# Patient Record
Sex: MALE | Race: BLACK OR AFRICAN AMERICAN | NOT HISPANIC OR LATINO | Employment: FULL TIME | URBAN - METROPOLITAN AREA
[De-identification: names, ages, dates, MRNs, and addresses within clinical notes are randomized per-mention and may not be internally consistent; named-entity substitution may affect disease eponyms.]

---

## 2017-01-03 RX ORDER — EMPAGLIFLOZIN 10 MG/1
TABLET, FILM COATED ORAL
Qty: 30 TABLET | Refills: 0 | Status: SHIPPED | OUTPATIENT
Start: 2017-01-03 | End: 2017-02-19 | Stop reason: SDUPTHER

## 2017-01-03 RX ORDER — METFORMIN HYDROCHLORIDE 500 MG/1
TABLET, EXTENDED RELEASE ORAL
Qty: 60 TABLET | Refills: 0 | Status: SHIPPED | OUTPATIENT
Start: 2017-01-03 | End: 2017-04-13 | Stop reason: SDUPTHER

## 2017-01-05 RX ORDER — ATORVASTATIN CALCIUM 10 MG/1
10 TABLET, FILM COATED ORAL DAILY
Qty: 30 TABLET | Refills: 0 | Status: SHIPPED | OUTPATIENT
Start: 2017-01-05 | End: 2017-02-19 | Stop reason: SDUPTHER

## 2017-01-19 ENCOUNTER — TELEPHONE (OUTPATIENT)
Dept: FAMILY MEDICINE CLINIC | Facility: CLINIC | Age: 52
End: 2017-01-19

## 2017-01-19 RX ORDER — HYDROCODONE BITARTRATE AND ACETAMINOPHEN 5; 325 MG/1; MG/1
1 TABLET ORAL EVERY 6 HOURS PRN
Qty: 30 TABLET | Refills: 0 | Status: SHIPPED | OUTPATIENT
Start: 2017-01-19 | End: 2017-02-14 | Stop reason: SDUPTHER

## 2017-01-19 NOTE — TELEPHONE ENCOUNTER
PT INFORMED RX UP FRONT    ----- Message from Ankur Avitia Jr., MD sent at 1/19/2017 11:08 AM EST -----  Done  ----- Message -----     From: Elisha Snider MA     Sent: 1/19/2017  10:55 AM       To: Ankur Avitia Jr., MD    RF HYDROCODONE

## 2017-02-14 ENCOUNTER — TELEPHONE (OUTPATIENT)
Dept: FAMILY MEDICINE CLINIC | Facility: CLINIC | Age: 52
End: 2017-02-14

## 2017-02-14 RX ORDER — HYDROCODONE BITARTRATE AND ACETAMINOPHEN 5; 325 MG/1; MG/1
1 TABLET ORAL EVERY 6 HOURS PRN
Qty: 30 TABLET | Refills: 0
Start: 2017-02-14 | End: 2017-03-13 | Stop reason: SDUPTHER

## 2017-02-14 NOTE — TELEPHONE ENCOUNTER
rx up front pt informed    ----- Message from Elisha Snider MA sent at 2/14/2017  1:19 PM EST -----  done  ----- Message -----     From: Elisha Snidre MA     Sent: 2/14/2017   1:15 PM       To: Ankur Avitia Jr., MD    Refill hydrocodone

## 2017-02-20 RX ORDER — ATORVASTATIN CALCIUM 10 MG/1
TABLET, FILM COATED ORAL
Qty: 30 TABLET | Refills: 0 | Status: SHIPPED | OUTPATIENT
Start: 2017-02-20 | End: 2017-03-24 | Stop reason: SDUPTHER

## 2017-02-20 RX ORDER — METFORMIN HYDROCHLORIDE 500 MG/1
TABLET, EXTENDED RELEASE ORAL
Qty: 60 TABLET | Refills: 0 | Status: SHIPPED | OUTPATIENT
Start: 2017-02-20 | End: 2019-02-12 | Stop reason: SDUPTHER

## 2017-02-20 RX ORDER — AMLODIPINE BESYLATE 5 MG/1
TABLET ORAL
Qty: 30 TABLET | Refills: 0 | Status: SHIPPED | OUTPATIENT
Start: 2017-02-20 | End: 2017-03-24 | Stop reason: SDUPTHER

## 2017-02-20 RX ORDER — EMPAGLIFLOZIN 10 MG/1
TABLET, FILM COATED ORAL
Qty: 30 TABLET | Refills: 0 | Status: SHIPPED | OUTPATIENT
Start: 2017-02-20 | End: 2017-03-24 | Stop reason: SDUPTHER

## 2017-03-13 ENCOUNTER — TELEPHONE (OUTPATIENT)
Dept: FAMILY MEDICINE CLINIC | Facility: CLINIC | Age: 52
End: 2017-03-13

## 2017-03-13 RX ORDER — HYDROCODONE BITARTRATE AND ACETAMINOPHEN 5; 325 MG/1; MG/1
1 TABLET ORAL EVERY 6 HOURS PRN
Qty: 30 TABLET | Refills: 0 | Status: SHIPPED | OUTPATIENT
Start: 2017-03-13 | End: 2017-04-11 | Stop reason: SDUPTHER

## 2017-03-13 RX ORDER — HYDROCODONE BITARTRATE AND ACETAMINOPHEN 5; 325 MG/1; MG/1
1 TABLET ORAL EVERY 6 HOURS PRN
Qty: 30 TABLET | Refills: 0
Start: 2017-03-13 | End: 2017-03-13 | Stop reason: SDUPTHER

## 2017-03-13 NOTE — TELEPHONE ENCOUNTER
rx up front  Pt informed    ----- Message from Ankur Avitia Jr., MD sent at 3/13/2017 11:13 AM EDT -----  Done let him know  ----- Message -----     From: Elisha Snider MA     Sent: 3/13/2017  10:55 AM       To: Ankur Avitia Jr., MD    Refill hydrocodone

## 2017-03-24 RX ORDER — EMPAGLIFLOZIN 10 MG/1
TABLET, FILM COATED ORAL
Qty: 30 TABLET | Refills: 0 | Status: SHIPPED | OUTPATIENT
Start: 2017-03-24 | End: 2017-04-22 | Stop reason: SDUPTHER

## 2017-03-24 RX ORDER — AMLODIPINE BESYLATE 5 MG/1
TABLET ORAL
Qty: 30 TABLET | Refills: 0 | Status: SHIPPED | OUTPATIENT
Start: 2017-03-24 | End: 2017-04-22 | Stop reason: SDUPTHER

## 2017-03-24 RX ORDER — ATORVASTATIN CALCIUM 10 MG/1
TABLET, FILM COATED ORAL
Qty: 30 TABLET | Refills: 0 | Status: SHIPPED | OUTPATIENT
Start: 2017-03-24 | End: 2017-04-22 | Stop reason: SDUPTHER

## 2017-03-27 RX ORDER — METFORMIN HYDROCHLORIDE 500 MG/1
TABLET, EXTENDED RELEASE ORAL
Qty: 60 TABLET | Refills: 0 | Status: SHIPPED | OUTPATIENT
Start: 2017-03-27 | End: 2017-06-02

## 2017-04-11 ENCOUNTER — TELEPHONE (OUTPATIENT)
Dept: FAMILY MEDICINE CLINIC | Facility: CLINIC | Age: 52
End: 2017-04-11

## 2017-04-11 RX ORDER — HYDROCODONE BITARTRATE AND ACETAMINOPHEN 5; 325 MG/1; MG/1
1 TABLET ORAL EVERY 6 HOURS PRN
Qty: 30 TABLET | Refills: 0 | Status: SHIPPED | OUTPATIENT
Start: 2017-04-11 | End: 2017-04-13 | Stop reason: SDUPTHER

## 2017-04-11 NOTE — TELEPHONE ENCOUNTER
PT INFORMED  HAS APPT 4-13    ----- Message from Ankur Avitia Jr., MD sent at 4/11/2017  9:28 AM EDT -----  Done  Due for fu appt 5/17  ----- Message -----     From: Elisha Snider MA     Sent: 4/11/2017   9:01 AM       To: Ankur Avitia Jr., MD    PT NEEDS REFILL ON HYDROCODONE

## 2017-04-13 ENCOUNTER — OFFICE VISIT (OUTPATIENT)
Dept: FAMILY MEDICINE CLINIC | Facility: CLINIC | Age: 52
End: 2017-04-13

## 2017-04-13 VITALS
TEMPERATURE: 98.1 F | SYSTOLIC BLOOD PRESSURE: 140 MMHG | HEART RATE: 99 BPM | WEIGHT: 254 LBS | BODY MASS INDEX: 36.36 KG/M2 | HEIGHT: 70 IN | DIASTOLIC BLOOD PRESSURE: 90 MMHG | OXYGEN SATURATION: 95 %

## 2017-04-13 DIAGNOSIS — E11.9 DIABETES MELLITUS WITHOUT COMPLICATION (HCC): ICD-10-CM

## 2017-04-13 DIAGNOSIS — Z79.899 HIGH RISK MEDICATION USE: ICD-10-CM

## 2017-04-13 DIAGNOSIS — E78.49 OTHER HYPERLIPIDEMIA: ICD-10-CM

## 2017-04-13 DIAGNOSIS — I10 HYPERTENSION, ESSENTIAL: Primary | ICD-10-CM

## 2017-04-13 DIAGNOSIS — Z23 NEED FOR PNEUMOCOCCAL VACCINE: ICD-10-CM

## 2017-04-13 LAB — ALBUMIN UR-MCNC: 100 MG/L (ref 0–20)

## 2017-04-13 PROCEDURE — 90732 PPSV23 VACC 2 YRS+ SUBQ/IM: CPT | Performed by: INTERNAL MEDICINE

## 2017-04-13 PROCEDURE — 99214 OFFICE O/P EST MOD 30 MIN: CPT | Performed by: INTERNAL MEDICINE

## 2017-04-13 PROCEDURE — 82043 UR ALBUMIN QUANTITATIVE: CPT | Performed by: INTERNAL MEDICINE

## 2017-04-13 PROCEDURE — 90471 IMMUNIZATION ADMIN: CPT | Performed by: INTERNAL MEDICINE

## 2017-04-13 RX ORDER — AMITRIPTYLINE HYDROCHLORIDE 10 MG/1
10 TABLET, FILM COATED ORAL NIGHTLY
COMMUNITY
End: 2018-10-26 | Stop reason: SDUPTHER

## 2017-04-13 RX ORDER — LISINOPRIL 10 MG/1
TABLET ORAL
Qty: 30 TABLET | Refills: 3 | Status: SHIPPED | OUTPATIENT
Start: 2017-04-13 | End: 2017-06-19

## 2017-04-13 RX ORDER — HYDROCODONE BITARTRATE AND ACETAMINOPHEN 5; 325 MG/1; MG/1
1 TABLET ORAL EVERY 6 HOURS PRN
Qty: 30 TABLET | Refills: 0 | Status: SHIPPED | OUTPATIENT
Start: 2017-04-13 | End: 2017-05-11 | Stop reason: SDUPTHER

## 2017-04-13 RX ORDER — LISINOPRIL 10 MG/1
TABLET ORAL
Qty: 30 TABLET | Refills: 3 | Status: SHIPPED | OUTPATIENT
Start: 2017-04-13 | End: 2017-04-13 | Stop reason: SDUPTHER

## 2017-04-13 NOTE — PROGRESS NOTES
Subjective   Go Cho is a 52 y.o. male.   Follow-up on blood pressure lipids medication monitoring diabetes  History of Present Illness   Glu 90s -100s doing fairly well is compliant with medication diet is fair so initially busy does not have time to exercise regularly we will get updated lab values on him.  Do not updated medication monitoring with his chronic pain medication for back issues.    Review of Systems   Neurological: Positive for headaches.       Objective   Vitals:    04/13/17 1047   BP: 140/90   Pulse: 99   Temp: 98.1 °F (36.7 °C)   SpO2: 95%   Weight: 254 lb (115 kg)     Physical Exam   Constitutional: He appears well-developed and well-nourished.   Eyes:   Pupils are equal   Cardiovascular: Normal rate, regular rhythm and normal heart sounds.    Pulmonary/Chest: Effort normal and breath sounds normal.   Abdominal: Soft. Bowel sounds are normal.   Neurological: He is alert.   Nursing note and vitals reviewed.      Lab Results   Component Value Date    INR 1.17 (H) 10/02/2016       Procedures    Assessment/Plan     1.  Hypertension plan continue present medication.  If blood pressure above 140/90 we will  Increase his lisinopril.    2.  Hyperlipidemia plan await lab if satisfactory continue present medication recheck 6 months    3.  Type 2 diabetes plan await labs recheck 6 months ago okay patient is going to contact ophthalmologist for eye exam.    4.  Medication monitoring chronic medication for pain for back urine drug screen obtained.    5.  Immunization update with pneumococcal vaccine given.  Call for lab results in 4 days' time  Much of this encounter note is an electronic transcription/translation of spoken language to printed text.  The electronic translation of spoken language may permit erroneous, or at times, nonsensical words or phrases to be inadvertently transcribed.  Although I have reviewed the note for such errors, some may still exist.

## 2017-04-20 LAB
CONV REPORT SUMMARY: NORMAL
Lab: NORMAL

## 2017-04-22 RX ORDER — EMPAGLIFLOZIN 10 MG/1
TABLET, FILM COATED ORAL
Qty: 30 TABLET | Refills: 0 | Status: SHIPPED | OUTPATIENT
Start: 2017-04-22 | End: 2017-05-21 | Stop reason: SDUPTHER

## 2017-04-22 RX ORDER — AMLODIPINE BESYLATE 5 MG/1
TABLET ORAL
Qty: 30 TABLET | Refills: 0 | Status: SHIPPED | OUTPATIENT
Start: 2017-04-22 | End: 2017-05-21 | Stop reason: SDUPTHER

## 2017-04-22 RX ORDER — ATORVASTATIN CALCIUM 10 MG/1
TABLET, FILM COATED ORAL
Qty: 30 TABLET | Refills: 0 | Status: SHIPPED | OUTPATIENT
Start: 2017-04-22 | End: 2017-05-21 | Stop reason: SDUPTHER

## 2017-04-24 RX ORDER — METFORMIN HYDROCHLORIDE 500 MG/1
TABLET, EXTENDED RELEASE ORAL
Qty: 60 TABLET | Refills: 0 | Status: SHIPPED | OUTPATIENT
Start: 2017-04-24 | End: 2017-06-02

## 2017-05-11 RX ORDER — DEXLANSOPRAZOLE 60 MG/1
CAPSULE, DELAYED RELEASE ORAL
Qty: 30 CAPSULE | Refills: 0 | Status: SHIPPED | OUTPATIENT
Start: 2017-05-11 | End: 2017-06-15 | Stop reason: SDUPTHER

## 2017-05-11 RX ORDER — HYDROCODONE BITARTRATE AND ACETAMINOPHEN 5; 325 MG/1; MG/1
1 TABLET ORAL EVERY 6 HOURS PRN
Qty: 30 TABLET | Refills: 0 | Status: SHIPPED | OUTPATIENT
Start: 2017-05-11 | End: 2017-06-02 | Stop reason: SDUPTHER

## 2017-05-22 RX ORDER — METFORMIN HYDROCHLORIDE 500 MG/1
TABLET, EXTENDED RELEASE ORAL
Qty: 60 TABLET | Refills: 0 | Status: SHIPPED | OUTPATIENT
Start: 2017-05-22 | End: 2017-06-02

## 2017-05-22 RX ORDER — AMLODIPINE BESYLATE 5 MG/1
TABLET ORAL
Qty: 30 TABLET | Refills: 0 | Status: SHIPPED | OUTPATIENT
Start: 2017-05-22 | End: 2017-06-26 | Stop reason: SDUPTHER

## 2017-05-22 RX ORDER — EMPAGLIFLOZIN 10 MG/1
TABLET, FILM COATED ORAL
Qty: 30 TABLET | Refills: 0 | Status: SHIPPED | OUTPATIENT
Start: 2017-05-22 | End: 2017-06-26 | Stop reason: SDUPTHER

## 2017-05-22 RX ORDER — ATORVASTATIN CALCIUM 10 MG/1
TABLET, FILM COATED ORAL
Qty: 30 TABLET | Refills: 0 | Status: SHIPPED | OUTPATIENT
Start: 2017-05-22 | End: 2017-06-26 | Stop reason: SDUPTHER

## 2017-06-02 ENCOUNTER — OFFICE VISIT (OUTPATIENT)
Dept: FAMILY MEDICINE CLINIC | Facility: CLINIC | Age: 52
End: 2017-06-02

## 2017-06-02 VITALS
OXYGEN SATURATION: 95 % | HEIGHT: 70 IN | WEIGHT: 257.4 LBS | TEMPERATURE: 98.5 F | BODY MASS INDEX: 36.85 KG/M2 | DIASTOLIC BLOOD PRESSURE: 80 MMHG | SYSTOLIC BLOOD PRESSURE: 160 MMHG | HEART RATE: 110 BPM

## 2017-06-02 DIAGNOSIS — R80.9 PROTEIN IN URINE: Primary | ICD-10-CM

## 2017-06-02 DIAGNOSIS — E78.49 OTHER HYPERLIPIDEMIA: ICD-10-CM

## 2017-06-02 DIAGNOSIS — E11.9 DIABETES MELLITUS WITHOUT COMPLICATION (HCC): ICD-10-CM

## 2017-06-02 DIAGNOSIS — I10 HYPERTENSION, ESSENTIAL: ICD-10-CM

## 2017-06-02 DIAGNOSIS — L72.9 SCROTAL CYST: Primary | ICD-10-CM

## 2017-06-02 LAB
ALBUMIN SERPL-MCNC: 4.1 G/DL (ref 3.5–5.2)
ALBUMIN UR-MCNC: 50 MG/L (ref 0–20)
ALBUMIN/GLOB SERPL: 1.2 G/DL
ALP SERPL-CCNC: 57 U/L (ref 39–117)
ALT SERPL W P-5'-P-CCNC: 41 U/L (ref 1–41)
ANION GAP SERPL CALCULATED.3IONS-SCNC: 9.4 MMOL/L
AST SERPL-CCNC: 24 U/L (ref 1–40)
BILIRUB SERPL-MCNC: 0.3 MG/DL (ref 0.1–1.2)
BUN BLD-MCNC: 13 MG/DL (ref 6–20)
BUN/CREAT SERPL: 14.4 (ref 7–25)
CALCIUM SPEC-SCNC: 9.5 MG/DL (ref 8.6–10.5)
CHLORIDE SERPL-SCNC: 92 MMOL/L (ref 98–107)
CHOLEST SERPL-MCNC: 96 MG/DL (ref 0–200)
CO2 SERPL-SCNC: 28.6 MMOL/L (ref 22–29)
CREAT BLD-MCNC: 0.9 MG/DL (ref 0.76–1.27)
GFR SERPL CREATININE-BSD FRML MDRD: 107 ML/MIN/1.73
GLOBULIN UR ELPH-MCNC: 3.5 GM/DL
GLUCOSE BLD-MCNC: 142 MG/DL (ref 65–99)
HBA1C MFR BLD: 6.3 % (ref 4.8–5.6)
HDLC SERPL-MCNC: 32 MG/DL (ref 40–60)
LDLC SERPL CALC-MCNC: 45 MG/DL (ref 0–100)
LDLC/HDLC SERPL: 1.41 {RATIO}
POTASSIUM BLD-SCNC: 5.2 MMOL/L (ref 3.5–5.2)
PROT SERPL-MCNC: 7.6 G/DL (ref 6–8.5)
SODIUM BLD-SCNC: 130 MMOL/L (ref 136–145)
TRIGL SERPL-MCNC: 94 MG/DL (ref 0–150)
VLDLC SERPL-MCNC: 18.8 MG/DL (ref 5–40)

## 2017-06-02 PROCEDURE — 83036 HEMOGLOBIN GLYCOSYLATED A1C: CPT | Performed by: INTERNAL MEDICINE

## 2017-06-02 PROCEDURE — 99214 OFFICE O/P EST MOD 30 MIN: CPT | Performed by: INTERNAL MEDICINE

## 2017-06-02 PROCEDURE — 80053 COMPREHEN METABOLIC PANEL: CPT | Performed by: INTERNAL MEDICINE

## 2017-06-02 PROCEDURE — 80061 LIPID PANEL: CPT | Performed by: INTERNAL MEDICINE

## 2017-06-02 PROCEDURE — 82043 UR ALBUMIN QUANTITATIVE: CPT | Performed by: INTERNAL MEDICINE

## 2017-06-02 RX ORDER — CLINDAMYCIN HYDROCHLORIDE 300 MG/1
300 CAPSULE ORAL 3 TIMES DAILY
Qty: 30 CAPSULE | Refills: 0 | Status: SHIPPED | OUTPATIENT
Start: 2017-06-02 | End: 2018-10-26 | Stop reason: SDUPTHER

## 2017-06-02 RX ORDER — HYDROCODONE BITARTRATE AND ACETAMINOPHEN 5; 325 MG/1; MG/1
1 TABLET ORAL EVERY 6 HOURS PRN
Qty: 30 TABLET | Refills: 0 | Status: SHIPPED | OUTPATIENT
Start: 2017-06-02 | End: 2017-06-16 | Stop reason: SDUPTHER

## 2017-06-02 NOTE — PROGRESS NOTES
Subjective   Go Cho is a 52 y.o. male.   Pain in the groin/scrotal area concern regarding a hernia  History of Present Illness   Patient with groin pain over the last couple weeks time history of hernia as a small child which she does not recall thinks his left inguinal area.  No pronounced heavy lifting but onset of pain last couple of weeks.  Some discomfort with walking hasn't vague discomfort in lower abdomen.  Does not feel like he has no active diverticulitis.  No associated temperature with that.  Patient stated updated updated testing for diabetes and blood pressure and cholesterol.  Sugars been doing fairly well by his description.  Denies any diarrhea type complaints.    Review of Systems   Gastrointestinal: Positive for abdominal pain.   Genitourinary: Positive for scrotal swelling and testicular pain.       Objective   Vitals:    06/02/17 1009   BP: 160/80   Pulse: 110   Temp: 98.5 °F (36.9 °C)   SpO2: 95%   Weight: 257 lb 6.4 oz (117 kg)     Physical Exam   Constitutional: He appears well-developed and well-nourished.   Eyes: Conjunctivae are normal. Pupils are equal, round, and reactive to light.   Cardiovascular: Normal rate, regular rhythm and normal heart sounds.    Pulmonary/Chest: Effort normal and breath sounds normal.   Abdominal: Soft. Bowel sounds are normal.   Genitourinary:   Genitourinary Comments: Penis within normal limits no inguinal lymphadenopathy noted testes within normal without palpable hernia.  Patient does appear to have a 1.5 cm infected cyst on the anterior scrotal sac.  Does.  Be good draining slightly clearish fluid.  No temperature with that.   Neurological: He is alert.   Normal gait   Skin: Skin is warm and dry.   Nursing note and vitals reviewed.      Lab Results   Component Value Date    INR 1.17 (H) 10/02/2016       Procedures    Assessment/Plan   1.  Scrotal cyst plan clindamycin 300 mg 3 times a day for 10 days' time.  Refer to Dr. Tank Swan or  urologist choice for further evaluation with potential for lancing.  Sitz baths 2-4 times a day.  Did suggest scrotal support for ambulation for comfort sake.    2.  Type 2 diabetes plan await lab satisfactory check in 6 months time    3.  Hyperlipidemia plan await lab satisfactory recheck in 6 months time    4.  Hypertension slightly increased today patient's check his blood pressure 1 feeling better if still high we will consider increasing his Norvasc from 5 mg to 10 mg.:  Blood pressure readings approximately 2 weeks time    Much of this encounter note is an electronic transcription/translation of spoken language to printed text.  The electronic translation of spoken language may permit erroneous, or at times, nonsensical words or phrases to be inadvertently transcribed.  Although I have reviewed the note for such errors, some may still exist.

## 2017-06-05 ENCOUNTER — LAB (OUTPATIENT)
Dept: FAMILY MEDICINE CLINIC | Facility: CLINIC | Age: 52
End: 2017-06-05

## 2017-06-05 DIAGNOSIS — R80.9 PROTEIN IN URINE: ICD-10-CM

## 2017-06-05 DIAGNOSIS — R80.9 PROTEINURIA: Primary | ICD-10-CM

## 2017-06-05 LAB
PROT 24H UR-MRATE: 510 MG/24HOURS (ref 0–150)
PROT UR-MCNC: 17 MG/DL

## 2017-06-16 RX ORDER — HYDROCODONE BITARTRATE AND ACETAMINOPHEN 5; 325 MG/1; MG/1
1 TABLET ORAL EVERY 6 HOURS PRN
Qty: 30 TABLET | Refills: 0 | Status: SHIPPED | OUTPATIENT
Start: 2017-06-16 | End: 2017-07-17 | Stop reason: SDUPTHER

## 2017-06-16 RX ORDER — DEXLANSOPRAZOLE 60 MG/1
CAPSULE, DELAYED RELEASE ORAL
Qty: 30 CAPSULE | Refills: 0 | Status: SHIPPED | OUTPATIENT
Start: 2017-06-16 | End: 2017-07-13 | Stop reason: SDUPTHER

## 2017-06-19 RX ORDER — LISINOPRIL 5 MG/1
5 TABLET ORAL DAILY
Qty: 30 TABLET | Refills: 5 | Status: SHIPPED | OUTPATIENT
Start: 2017-06-19 | End: 2017-08-15 | Stop reason: SDUPTHER

## 2017-06-26 RX ORDER — ATORVASTATIN CALCIUM 10 MG/1
TABLET, FILM COATED ORAL
Qty: 30 TABLET | Refills: 0 | Status: SHIPPED | OUTPATIENT
Start: 2017-06-26 | End: 2017-07-27 | Stop reason: SDUPTHER

## 2017-06-26 RX ORDER — AMLODIPINE BESYLATE 5 MG/1
TABLET ORAL
Qty: 30 TABLET | Refills: 0 | Status: SHIPPED | OUTPATIENT
Start: 2017-06-26 | End: 2017-08-01 | Stop reason: SDUPTHER

## 2017-06-26 RX ORDER — EMPAGLIFLOZIN 10 MG/1
TABLET, FILM COATED ORAL
Qty: 30 TABLET | Refills: 0 | Status: SHIPPED | OUTPATIENT
Start: 2017-06-26 | End: 2017-08-01 | Stop reason: SDUPTHER

## 2017-07-03 RX ORDER — METFORMIN HYDROCHLORIDE 500 MG/1
TABLET, EXTENDED RELEASE ORAL
Qty: 60 TABLET | Refills: 0 | Status: SHIPPED | OUTPATIENT
Start: 2017-07-03 | End: 2018-01-23 | Stop reason: SDUPTHER

## 2017-07-14 RX ORDER — DEXLANSOPRAZOLE 60 MG/1
CAPSULE, DELAYED RELEASE ORAL
Qty: 30 CAPSULE | Refills: 0 | Status: SHIPPED | OUTPATIENT
Start: 2017-07-14 | End: 2017-08-13 | Stop reason: SDUPTHER

## 2017-07-17 ENCOUNTER — TELEPHONE (OUTPATIENT)
Dept: FAMILY MEDICINE CLINIC | Facility: CLINIC | Age: 52
End: 2017-07-17

## 2017-07-17 RX ORDER — HYDROCODONE BITARTRATE AND ACETAMINOPHEN 5; 325 MG/1; MG/1
1 TABLET ORAL EVERY 6 HOURS PRN
Qty: 30 TABLET | Refills: 0 | Status: SHIPPED | OUTPATIENT
Start: 2017-07-17 | End: 2017-08-15 | Stop reason: SDUPTHER

## 2017-07-17 NOTE — TELEPHONE ENCOUNTER
Pt informed rx for hydrocodone ready up front    ----- Message from Ankur Avitia Jr., MD sent at 7/17/2017  2:52 PM EDT -----  Contact: Patient contact us refill on his pain medication  Appears to be time we will initiate the refill

## 2017-07-27 RX ORDER — ATORVASTATIN CALCIUM 10 MG/1
TABLET, FILM COATED ORAL
Qty: 30 TABLET | Refills: 0 | Status: SHIPPED | OUTPATIENT
Start: 2017-07-27 | End: 2017-08-24 | Stop reason: SDUPTHER

## 2017-08-02 RX ORDER — EMPAGLIFLOZIN 10 MG/1
TABLET, FILM COATED ORAL
Qty: 30 TABLET | Refills: 0 | Status: SHIPPED | OUTPATIENT
Start: 2017-08-02 | End: 2017-08-31 | Stop reason: SDUPTHER

## 2017-08-02 RX ORDER — AMLODIPINE BESYLATE 5 MG/1
TABLET ORAL
Qty: 30 TABLET | Refills: 0 | Status: SHIPPED | OUTPATIENT
Start: 2017-08-02 | End: 2017-08-31 | Stop reason: SDUPTHER

## 2017-08-02 RX ORDER — METFORMIN HYDROCHLORIDE 500 MG/1
TABLET, EXTENDED RELEASE ORAL
Qty: 60 TABLET | Refills: 0 | Status: SHIPPED | OUTPATIENT
Start: 2017-08-02 | End: 2018-01-23 | Stop reason: SDUPTHER

## 2017-08-14 RX ORDER — DEXLANSOPRAZOLE 60 MG/1
CAPSULE, DELAYED RELEASE ORAL
Qty: 30 CAPSULE | Refills: 0 | Status: SHIPPED | OUTPATIENT
Start: 2017-08-14 | End: 2017-09-16 | Stop reason: SDUPTHER

## 2017-08-15 RX ORDER — LISINOPRIL 5 MG/1
5 TABLET ORAL DAILY
Qty: 30 TABLET | Refills: 5 | Status: SHIPPED | OUTPATIENT
Start: 2017-08-15 | End: 2017-08-15 | Stop reason: SDUPTHER

## 2017-08-15 RX ORDER — HYDROCODONE BITARTRATE AND ACETAMINOPHEN 5; 325 MG/1; MG/1
1 TABLET ORAL EVERY 6 HOURS PRN
Qty: 30 TABLET | Refills: 0 | Status: SHIPPED | OUTPATIENT
Start: 2017-08-15 | End: 2017-10-03 | Stop reason: SDUPTHER

## 2017-08-15 RX ORDER — LISINOPRIL 5 MG/1
5 TABLET ORAL DAILY
Qty: 30 TABLET | Refills: 5 | Status: SHIPPED | OUTPATIENT
Start: 2017-08-15 | End: 2019-10-15

## 2017-08-24 RX ORDER — ATORVASTATIN CALCIUM 10 MG/1
TABLET, FILM COATED ORAL
Qty: 30 TABLET | Refills: 0 | Status: SHIPPED | OUTPATIENT
Start: 2017-08-24 | End: 2017-09-26 | Stop reason: SDUPTHER

## 2017-09-01 RX ORDER — EMPAGLIFLOZIN 10 MG/1
TABLET, FILM COATED ORAL
Qty: 30 TABLET | Refills: 0 | Status: SHIPPED | OUTPATIENT
Start: 2017-09-01 | End: 2017-10-05 | Stop reason: SDUPTHER

## 2017-09-01 RX ORDER — AMLODIPINE BESYLATE 5 MG/1
TABLET ORAL
Qty: 30 TABLET | Refills: 0 | Status: SHIPPED | OUTPATIENT
Start: 2017-09-01 | End: 2017-10-05 | Stop reason: SDUPTHER

## 2017-09-01 RX ORDER — METFORMIN HYDROCHLORIDE 500 MG/1
TABLET, EXTENDED RELEASE ORAL
Qty: 60 TABLET | Refills: 0 | Status: SHIPPED | OUTPATIENT
Start: 2017-09-01 | End: 2018-01-23 | Stop reason: SDUPTHER

## 2017-09-11 RX ORDER — GLIMEPIRIDE 2 MG/1
TABLET ORAL
Qty: 30 TABLET | Refills: 0 | Status: SHIPPED | OUTPATIENT
Start: 2017-09-11 | End: 2017-10-05 | Stop reason: SDUPTHER

## 2017-09-18 RX ORDER — DEXLANSOPRAZOLE 60 MG/1
CAPSULE, DELAYED RELEASE ORAL
Qty: 30 CAPSULE | Refills: 0 | Status: SHIPPED | OUTPATIENT
Start: 2017-09-18 | End: 2017-10-17 | Stop reason: SDUPTHER

## 2017-09-26 RX ORDER — ATORVASTATIN CALCIUM 10 MG/1
TABLET, FILM COATED ORAL
Qty: 30 TABLET | Refills: 0 | Status: SHIPPED | OUTPATIENT
Start: 2017-09-26 | End: 2017-10-24 | Stop reason: SDUPTHER

## 2017-09-28 RX ORDER — METFORMIN HYDROCHLORIDE 500 MG/1
TABLET, EXTENDED RELEASE ORAL
Qty: 60 TABLET | Refills: 3 | Status: SHIPPED | OUTPATIENT
Start: 2017-09-28 | End: 2018-01-23 | Stop reason: SDUPTHER

## 2017-10-03 ENCOUNTER — TELEPHONE (OUTPATIENT)
Dept: FAMILY MEDICINE CLINIC | Facility: CLINIC | Age: 52
End: 2017-10-03

## 2017-10-03 RX ORDER — HYDROCODONE BITARTRATE AND ACETAMINOPHEN 5; 325 MG/1; MG/1
1 TABLET ORAL EVERY 6 HOURS PRN
Qty: 30 TABLET | Refills: 0 | Status: SHIPPED | OUTPATIENT
Start: 2017-10-03 | End: 2017-11-16 | Stop reason: SDUPTHER

## 2017-10-06 RX ORDER — GLIMEPIRIDE 2 MG/1
TABLET ORAL
Qty: 30 TABLET | Refills: 0 | Status: SHIPPED | OUTPATIENT
Start: 2017-10-06 | End: 2017-11-03 | Stop reason: SDUPTHER

## 2017-10-06 RX ORDER — AMLODIPINE BESYLATE 5 MG/1
TABLET ORAL
Qty: 30 TABLET | Refills: 0 | Status: SHIPPED | OUTPATIENT
Start: 2017-10-06 | End: 2017-11-08 | Stop reason: SDUPTHER

## 2017-10-06 RX ORDER — EMPAGLIFLOZIN 10 MG/1
TABLET, FILM COATED ORAL
Qty: 30 TABLET | Refills: 0 | Status: SHIPPED | OUTPATIENT
Start: 2017-10-06 | End: 2017-11-08 | Stop reason: SDUPTHER

## 2017-10-17 RX ORDER — DEXLANSOPRAZOLE 60 MG/1
CAPSULE, DELAYED RELEASE ORAL
Qty: 30 CAPSULE | Refills: 5 | Status: SHIPPED | OUTPATIENT
Start: 2017-10-17 | End: 2018-04-13 | Stop reason: SDUPTHER

## 2017-10-24 RX ORDER — ATORVASTATIN CALCIUM 10 MG/1
TABLET, FILM COATED ORAL
Qty: 30 TABLET | Refills: 0 | Status: SHIPPED | OUTPATIENT
Start: 2017-10-24 | End: 2017-11-16 | Stop reason: SDUPTHER

## 2017-11-03 RX ORDER — GLIMEPIRIDE 2 MG/1
TABLET ORAL
Qty: 30 TABLET | Refills: 0 | Status: SHIPPED | OUTPATIENT
Start: 2017-11-03 | End: 2017-12-06 | Stop reason: SDUPTHER

## 2017-11-08 RX ORDER — EMPAGLIFLOZIN 10 MG/1
TABLET, FILM COATED ORAL
Qty: 30 TABLET | Refills: 0 | Status: SHIPPED | OUTPATIENT
Start: 2017-11-08 | End: 2017-12-10 | Stop reason: SDUPTHER

## 2017-11-08 RX ORDER — AMLODIPINE BESYLATE 5 MG/1
TABLET ORAL
Qty: 30 TABLET | Refills: 0 | Status: SHIPPED | OUTPATIENT
Start: 2017-11-08 | End: 2017-12-10 | Stop reason: SDUPTHER

## 2017-11-16 ENCOUNTER — TELEPHONE (OUTPATIENT)
Dept: FAMILY MEDICINE CLINIC | Facility: CLINIC | Age: 52
End: 2017-11-16

## 2017-11-16 RX ORDER — ATORVASTATIN CALCIUM 10 MG/1
TABLET, FILM COATED ORAL
Qty: 30 TABLET | Refills: 0 | Status: SHIPPED | OUTPATIENT
Start: 2017-11-16 | End: 2017-12-14 | Stop reason: SDUPTHER

## 2017-11-16 RX ORDER — HYDROCODONE BITARTRATE AND ACETAMINOPHEN 5; 325 MG/1; MG/1
1 TABLET ORAL EVERY 6 HOURS PRN
Qty: 30 TABLET | Refills: 0 | Status: SHIPPED | OUTPATIENT
Start: 2017-11-16 | End: 2018-01-23 | Stop reason: SDUPTHER

## 2017-12-06 RX ORDER — GLIMEPIRIDE 2 MG/1
TABLET ORAL
Qty: 30 TABLET | Refills: 0 | Status: SHIPPED | OUTPATIENT
Start: 2017-12-06 | End: 2018-05-10 | Stop reason: SDUPTHER

## 2017-12-11 RX ORDER — EMPAGLIFLOZIN 10 MG/1
TABLET, FILM COATED ORAL
Qty: 30 TABLET | Refills: 0 | Status: SHIPPED | OUTPATIENT
Start: 2017-12-11 | End: 2018-01-10 | Stop reason: SDUPTHER

## 2017-12-11 RX ORDER — AMLODIPINE BESYLATE 5 MG/1
TABLET ORAL
Qty: 30 TABLET | Refills: 0 | Status: SHIPPED | OUTPATIENT
Start: 2017-12-11 | End: 2018-01-10 | Stop reason: SDUPTHER

## 2017-12-14 RX ORDER — ATORVASTATIN CALCIUM 10 MG/1
TABLET, FILM COATED ORAL
Qty: 30 TABLET | Refills: 0 | Status: SHIPPED | OUTPATIENT
Start: 2017-12-14 | End: 2018-01-10 | Stop reason: SDUPTHER

## 2017-12-20 RX ORDER — GLIMEPIRIDE 2 MG/1
TABLET ORAL
Qty: 30 TABLET | Refills: 0 | Status: SHIPPED | OUTPATIENT
Start: 2017-12-20 | End: 2018-01-10 | Stop reason: SDUPTHER

## 2018-01-10 RX ORDER — EMPAGLIFLOZIN 10 MG/1
TABLET, FILM COATED ORAL
Qty: 30 TABLET | Refills: 0 | Status: SHIPPED | OUTPATIENT
Start: 2018-01-10 | End: 2018-02-13 | Stop reason: SDUPTHER

## 2018-01-10 RX ORDER — ATORVASTATIN CALCIUM 10 MG/1
TABLET, FILM COATED ORAL
Qty: 30 TABLET | Refills: 0 | Status: SHIPPED | OUTPATIENT
Start: 2018-01-10 | End: 2018-02-09 | Stop reason: SDUPTHER

## 2018-01-10 RX ORDER — GLIMEPIRIDE 2 MG/1
TABLET ORAL
Qty: 30 TABLET | Refills: 0 | Status: SHIPPED | OUTPATIENT
Start: 2018-01-10 | End: 2018-02-09 | Stop reason: SDUPTHER

## 2018-01-10 RX ORDER — AMLODIPINE BESYLATE 5 MG/1
TABLET ORAL
Qty: 30 TABLET | Refills: 0 | Status: SHIPPED | OUTPATIENT
Start: 2018-01-10 | End: 2018-02-13 | Stop reason: SDUPTHER

## 2018-01-18 RX ORDER — METFORMIN HYDROCHLORIDE 500 MG/1
TABLET, EXTENDED RELEASE ORAL
Qty: 60 TABLET | Refills: 0 | Status: SHIPPED | OUTPATIENT
Start: 2018-01-18 | End: 2018-01-23 | Stop reason: SDUPTHER

## 2018-01-23 ENCOUNTER — OFFICE VISIT (OUTPATIENT)
Dept: FAMILY MEDICINE CLINIC | Facility: CLINIC | Age: 53
End: 2018-01-23

## 2018-01-23 VITALS
DIASTOLIC BLOOD PRESSURE: 72 MMHG | WEIGHT: 250 LBS | BODY MASS INDEX: 35.79 KG/M2 | OXYGEN SATURATION: 96 % | TEMPERATURE: 99 F | HEIGHT: 70 IN | HEART RATE: 86 BPM | SYSTOLIC BLOOD PRESSURE: 126 MMHG

## 2018-01-23 DIAGNOSIS — I10 HYPERTENSION, ESSENTIAL: Primary | ICD-10-CM

## 2018-01-23 DIAGNOSIS — E78.49 OTHER HYPERLIPIDEMIA: ICD-10-CM

## 2018-01-23 DIAGNOSIS — E11.9 DIABETES MELLITUS WITHOUT COMPLICATION (HCC): ICD-10-CM

## 2018-01-23 DIAGNOSIS — J01.00 ACUTE MAXILLARY SINUSITIS, RECURRENCE NOT SPECIFIED: ICD-10-CM

## 2018-01-23 LAB
ALBUMIN SERPL-MCNC: 4.5 G/DL (ref 3.5–5.2)
ALBUMIN UR-MCNC: 50 MG/L (ref 0–20)
ALBUMIN/GLOB SERPL: 1.2 G/DL
ALP SERPL-CCNC: 86 U/L (ref 39–117)
ALT SERPL W P-5'-P-CCNC: 39 U/L (ref 1–41)
ANION GAP SERPL CALCULATED.3IONS-SCNC: 11.9 MMOL/L
AST SERPL-CCNC: 39 U/L (ref 1–40)
BILIRUB SERPL-MCNC: 0.5 MG/DL (ref 0.1–1.2)
BUN BLD-MCNC: 13 MG/DL (ref 6–20)
BUN/CREAT SERPL: 16.7 (ref 7–25)
CALCIUM SPEC-SCNC: 9.6 MG/DL (ref 8.6–10.5)
CHLORIDE SERPL-SCNC: 97 MMOL/L (ref 98–107)
CHOLEST SERPL-MCNC: 114 MG/DL (ref 0–200)
CO2 SERPL-SCNC: 30.1 MMOL/L (ref 22–29)
CREAT BLD-MCNC: 0.78 MG/DL (ref 0.76–1.27)
GFR SERPL CREATININE-BSD FRML MDRD: 126 ML/MIN/1.73
GLOBULIN UR ELPH-MCNC: 3.8 GM/DL
GLUCOSE BLD-MCNC: 80 MG/DL (ref 65–99)
HBA1C MFR BLD: 6.5 % (ref 4.8–5.6)
HDLC SERPL-MCNC: 47 MG/DL (ref 40–60)
LDLC SERPL CALC-MCNC: 54 MG/DL (ref 0–100)
LDLC/HDLC SERPL: 1.14 {RATIO}
POTASSIUM BLD-SCNC: 4.2 MMOL/L (ref 3.5–5.2)
PROT SERPL-MCNC: 8.3 G/DL (ref 6–8.5)
SODIUM BLD-SCNC: 139 MMOL/L (ref 136–145)
TRIGL SERPL-MCNC: 66 MG/DL (ref 0–150)
VLDLC SERPL-MCNC: 13.2 MG/DL (ref 5–40)

## 2018-01-23 PROCEDURE — 83036 HEMOGLOBIN GLYCOSYLATED A1C: CPT | Performed by: INTERNAL MEDICINE

## 2018-01-23 PROCEDURE — 99214 OFFICE O/P EST MOD 30 MIN: CPT | Performed by: INTERNAL MEDICINE

## 2018-01-23 PROCEDURE — 82043 UR ALBUMIN QUANTITATIVE: CPT | Performed by: INTERNAL MEDICINE

## 2018-01-23 PROCEDURE — 80061 LIPID PANEL: CPT | Performed by: INTERNAL MEDICINE

## 2018-01-23 PROCEDURE — 36415 COLL VENOUS BLD VENIPUNCTURE: CPT | Performed by: INTERNAL MEDICINE

## 2018-01-23 PROCEDURE — 80053 COMPREHEN METABOLIC PANEL: CPT | Performed by: INTERNAL MEDICINE

## 2018-01-23 RX ORDER — SILDENAFIL 100 MG/1
100 TABLET, FILM COATED ORAL DAILY PRN
Qty: 6 TABLET | Refills: 11 | Status: SHIPPED | OUTPATIENT
Start: 2018-01-23

## 2018-01-23 RX ORDER — CEFUROXIME AXETIL 250 MG/1
250 TABLET ORAL 2 TIMES DAILY
Qty: 20 TABLET | Refills: 0 | Status: SHIPPED | OUTPATIENT
Start: 2018-01-23 | End: 2018-10-26 | Stop reason: SDUPTHER

## 2018-01-23 RX ORDER — HYDROCODONE BITARTRATE AND ACETAMINOPHEN 5; 325 MG/1; MG/1
1 TABLET ORAL EVERY 6 HOURS PRN
Qty: 30 TABLET | Refills: 0 | Status: SHIPPED | OUTPATIENT
Start: 2018-01-23 | End: 2018-03-30 | Stop reason: SDUPTHER

## 2018-01-23 NOTE — PROGRESS NOTES
Subjective   Go Cho is a 52 y.o. male.   Having pressure in his sinuses and drainage for the last 3 weeks time.  Not getting better did get seen elsewhere then about given without success pressure is more maxillary sinuses also on the forehead/frontal sinus cavity  History of Present Illness   As above the sinus drainage also is due for blood pressure recheck as well as lipids and diabetes glucoses been doing fairly well blood pressure is good no other complaints does request refill on pain medications and be appropriate timewise as well as Viagra prescription refill which we'll do..    Review of Systems   HENT: Positive for congestion and sinus pressure.    All other systems reviewed and are negative.      Objective   Vitals:    01/23/18 1537   BP: 126/72   Pulse: 86   Temp: 99 °F (37.2 °C)   SpO2: 96%   Weight: 113 kg (250 lb)     Physical Exam   Constitutional: He appears well-developed and well-nourished.   HENT:   Head: Normocephalic and atraumatic.   Right Ear: External ear normal.   Left Ear: External ear normal.   Mouth/Throat: Oropharynx is clear and moist.   Mild pressure over maxillary sinuses   Eyes: Conjunctivae are normal. Pupils are equal, round, and reactive to light.   Cardiovascular: Normal rate, regular rhythm and normal heart sounds.    Pulmonary/Chest: Effort normal and breath sounds normal.   Abdominal: Soft. Bowel sounds are normal.   Neurological: He is alert.   Unremarkable gait and station   Skin: Skin is warm and dry.   Nursing note and vitals reviewed.      Lab Results   Component Value Date    INR 1.17 (H) 10/02/2016       Procedures    Assessment/Plan   1.  Acute maxillary sinusitis plan Ceftin 250 twice a day for 10 days' time    2.  Hypertension controlled continue present medicines recheck in 6 months    3.  Hyperlipidemia plan await lab if satisfactory check in 6 months    4.  Type 2 diabetes await lab if no adjustments her card follow-up in 6 months    Much of this  encounter note is an electronic transcription/translation of spoken language to printed text.  The electronic translation of spoken language may permit erroneous, or at times, nonsensical words or phrases to be inadvertently transcribed.  Although I have reviewed the note for such errors, some may still exist.

## 2018-01-26 ENCOUNTER — TELEPHONE (OUTPATIENT)
Dept: FAMILY MEDICINE CLINIC | Facility: CLINIC | Age: 53
End: 2018-01-26

## 2018-01-26 NOTE — TELEPHONE ENCOUNTER
CALLING ABOUT APPEAL INFO ON THE MEDICATION JARDIANCE 10 MG, DID WE RECEIVE IT, AND ARE WE GOING TO PURSUE THAT APPEAL?

## 2018-01-31 ENCOUNTER — TELEPHONE (OUTPATIENT)
Dept: FAMILY MEDICINE CLINIC | Facility: CLINIC | Age: 53
End: 2018-01-31

## 2018-02-01 RX ORDER — QUETIAPINE FUMARATE 50 MG/1
50 TABLET, FILM COATED ORAL NIGHTLY
Qty: 30 TABLET | Refills: 0 | Status: SHIPPED | OUTPATIENT
Start: 2018-02-01 | End: 2018-03-01 | Stop reason: SDUPTHER

## 2018-02-09 RX ORDER — GLIMEPIRIDE 2 MG/1
TABLET ORAL
Qty: 30 TABLET | Refills: 0 | Status: SHIPPED | OUTPATIENT
Start: 2018-02-09 | End: 2018-03-15 | Stop reason: SDUPTHER

## 2018-02-09 RX ORDER — ATORVASTATIN CALCIUM 10 MG/1
TABLET, FILM COATED ORAL
Qty: 30 TABLET | Refills: 0 | Status: SHIPPED | OUTPATIENT
Start: 2018-02-09 | End: 2018-03-15 | Stop reason: SDUPTHER

## 2018-02-13 RX ORDER — AMLODIPINE BESYLATE 5 MG/1
TABLET ORAL
Qty: 30 TABLET | Refills: 0 | Status: SHIPPED | OUTPATIENT
Start: 2018-02-13 | End: 2018-03-22 | Stop reason: SDUPTHER

## 2018-02-13 RX ORDER — EMPAGLIFLOZIN 10 MG/1
TABLET, FILM COATED ORAL
Qty: 30 TABLET | Refills: 0 | Status: SHIPPED | OUTPATIENT
Start: 2018-02-13 | End: 2018-03-15 | Stop reason: SDUPTHER

## 2018-02-14 RX ORDER — LISINOPRIL 5 MG/1
TABLET ORAL
Qty: 30 TABLET | Refills: 0 | Status: SHIPPED | OUTPATIENT
Start: 2018-02-14 | End: 2018-03-15 | Stop reason: SDUPTHER

## 2018-03-01 RX ORDER — METFORMIN HYDROCHLORIDE 500 MG/1
TABLET, EXTENDED RELEASE ORAL
Qty: 60 TABLET | Refills: 0 | Status: SHIPPED | OUTPATIENT
Start: 2018-03-01 | End: 2018-04-11 | Stop reason: SDUPTHER

## 2018-03-01 RX ORDER — QUETIAPINE FUMARATE 50 MG/1
TABLET, FILM COATED ORAL
Qty: 30 TABLET | Refills: 0 | Status: SHIPPED | OUTPATIENT
Start: 2018-03-01 | End: 2018-03-15 | Stop reason: SDUPTHER

## 2018-03-15 RX ORDER — GLIMEPIRIDE 2 MG/1
TABLET ORAL
Qty: 30 TABLET | Refills: 0 | Status: SHIPPED | OUTPATIENT
Start: 2018-03-15 | End: 2018-04-13 | Stop reason: SDUPTHER

## 2018-03-15 RX ORDER — ATORVASTATIN CALCIUM 10 MG/1
TABLET, FILM COATED ORAL
Qty: 30 TABLET | Refills: 0 | Status: SHIPPED | OUTPATIENT
Start: 2018-03-15 | End: 2018-04-13 | Stop reason: SDUPTHER

## 2018-03-15 RX ORDER — LISINOPRIL 5 MG/1
TABLET ORAL
Qty: 30 TABLET | Refills: 0 | Status: SHIPPED | OUTPATIENT
Start: 2018-03-15 | End: 2018-04-13 | Stop reason: SDUPTHER

## 2018-03-16 RX ORDER — EMPAGLIFLOZIN 10 MG/1
TABLET, FILM COATED ORAL
Qty: 30 TABLET | Refills: 0 | Status: SHIPPED | OUTPATIENT
Start: 2018-03-16 | End: 2018-04-26 | Stop reason: SDUPTHER

## 2018-03-16 RX ORDER — QUETIAPINE FUMARATE 50 MG/1
TABLET, FILM COATED ORAL
Qty: 30 TABLET | Refills: 0 | Status: SHIPPED | OUTPATIENT
Start: 2018-03-16 | End: 2018-03-20 | Stop reason: DRUGHIGH

## 2018-03-20 RX ORDER — QUETIAPINE FUMARATE 50 MG/1
100 TABLET, FILM COATED ORAL NIGHTLY
Qty: 30 TABLET | Refills: 5 | Status: SHIPPED | OUTPATIENT
Start: 2018-03-20 | End: 2018-06-29 | Stop reason: SDUPTHER

## 2018-03-22 RX ORDER — AMLODIPINE BESYLATE 5 MG/1
TABLET ORAL
Qty: 30 TABLET | Refills: 0 | Status: SHIPPED | OUTPATIENT
Start: 2018-03-22 | End: 2018-04-26 | Stop reason: SDUPTHER

## 2018-03-30 ENCOUNTER — TELEPHONE (OUTPATIENT)
Dept: FAMILY MEDICINE CLINIC | Facility: CLINIC | Age: 53
End: 2018-03-30

## 2018-03-30 RX ORDER — HYDROCODONE BITARTRATE AND ACETAMINOPHEN 5; 325 MG/1; MG/1
1 TABLET ORAL EVERY 6 HOURS PRN
Qty: 30 TABLET | Refills: 0 | Status: SHIPPED | OUTPATIENT
Start: 2018-03-30 | End: 2018-06-06 | Stop reason: SDUPTHER

## 2018-04-11 RX ORDER — METFORMIN HYDROCHLORIDE 500 MG/1
TABLET, EXTENDED RELEASE ORAL
Qty: 60 TABLET | Refills: 0 | Status: SHIPPED | OUTPATIENT
Start: 2018-04-11 | End: 2018-05-09 | Stop reason: SDUPTHER

## 2018-04-13 RX ORDER — GLIMEPIRIDE 2 MG/1
TABLET ORAL
Qty: 30 TABLET | Refills: 0 | Status: SHIPPED | OUTPATIENT
Start: 2018-04-13 | End: 2020-05-20 | Stop reason: SDUPTHER

## 2018-04-13 RX ORDER — ATORVASTATIN CALCIUM 10 MG/1
TABLET, FILM COATED ORAL
Qty: 30 TABLET | Refills: 0 | Status: SHIPPED | OUTPATIENT
Start: 2018-04-13 | End: 2018-05-10 | Stop reason: SDUPTHER

## 2018-04-13 RX ORDER — DEXLANSOPRAZOLE 60 MG/1
CAPSULE, DELAYED RELEASE ORAL
Qty: 30 CAPSULE | Refills: 0 | Status: SHIPPED | OUTPATIENT
Start: 2018-04-13 | End: 2018-05-10 | Stop reason: SDUPTHER

## 2018-04-13 RX ORDER — LISINOPRIL 5 MG/1
TABLET ORAL
Qty: 30 TABLET | Refills: 0 | Status: SHIPPED | OUTPATIENT
Start: 2018-04-13 | End: 2018-05-10 | Stop reason: SDUPTHER

## 2018-04-26 RX ORDER — EMPAGLIFLOZIN 10 MG/1
TABLET, FILM COATED ORAL
Qty: 30 TABLET | Refills: 0 | Status: SHIPPED | OUTPATIENT
Start: 2018-04-26 | End: 2018-05-28 | Stop reason: SDUPTHER

## 2018-04-26 RX ORDER — AMLODIPINE BESYLATE 5 MG/1
TABLET ORAL
Qty: 30 TABLET | Refills: 0 | Status: SHIPPED | OUTPATIENT
Start: 2018-04-26 | End: 2018-05-29 | Stop reason: SDUPTHER

## 2018-05-09 RX ORDER — METFORMIN HYDROCHLORIDE 500 MG/1
TABLET, EXTENDED RELEASE ORAL
Qty: 60 TABLET | Refills: 0 | Status: SHIPPED | OUTPATIENT
Start: 2018-05-09 | End: 2018-06-10 | Stop reason: SDUPTHER

## 2018-05-10 RX ORDER — GLIMEPIRIDE 2 MG/1
TABLET ORAL
Qty: 30 TABLET | Refills: 3 | Status: SHIPPED | OUTPATIENT
Start: 2018-05-10 | End: 2018-09-05 | Stop reason: SDUPTHER

## 2018-05-10 RX ORDER — ATORVASTATIN CALCIUM 10 MG/1
TABLET, FILM COATED ORAL
Qty: 30 TABLET | Refills: 3 | Status: SHIPPED | OUTPATIENT
Start: 2018-05-10 | End: 2018-09-05 | Stop reason: SDUPTHER

## 2018-05-10 RX ORDER — DEXLANSOPRAZOLE 60 MG/1
CAPSULE, DELAYED RELEASE ORAL
Qty: 30 CAPSULE | Refills: 3 | Status: SHIPPED | OUTPATIENT
Start: 2018-05-10 | End: 2018-08-10 | Stop reason: SDUPTHER

## 2018-05-10 RX ORDER — LISINOPRIL 5 MG/1
TABLET ORAL
Qty: 30 TABLET | Refills: 3 | Status: SHIPPED | OUTPATIENT
Start: 2018-05-10 | End: 2018-10-26 | Stop reason: SDUPTHER

## 2018-05-29 RX ORDER — EMPAGLIFLOZIN 10 MG/1
TABLET, FILM COATED ORAL
Qty: 30 TABLET | Refills: 0 | Status: SHIPPED | OUTPATIENT
Start: 2018-05-29 | End: 2018-06-25 | Stop reason: SDUPTHER

## 2018-05-30 RX ORDER — AMLODIPINE BESYLATE 5 MG/1
TABLET ORAL
Qty: 30 TABLET | Refills: 0 | Status: SHIPPED | OUTPATIENT
Start: 2018-05-30 | End: 2018-06-29 | Stop reason: SDUPTHER

## 2018-06-06 ENCOUNTER — TELEPHONE (OUTPATIENT)
Dept: FAMILY MEDICINE CLINIC | Facility: CLINIC | Age: 53
End: 2018-06-06

## 2018-06-06 RX ORDER — HYDROCODONE BITARTRATE AND ACETAMINOPHEN 5; 325 MG/1; MG/1
1 TABLET ORAL EVERY 6 HOURS PRN
Qty: 30 TABLET | Refills: 0 | Status: SHIPPED | OUTPATIENT
Start: 2018-06-06 | End: 2018-07-17 | Stop reason: SDUPTHER

## 2018-06-11 RX ORDER — METFORMIN HYDROCHLORIDE 500 MG/1
TABLET, EXTENDED RELEASE ORAL
Qty: 60 TABLET | Refills: 0 | Status: SHIPPED | OUTPATIENT
Start: 2018-06-11 | End: 2018-07-12 | Stop reason: SDUPTHER

## 2018-06-26 RX ORDER — EMPAGLIFLOZIN 10 MG/1
TABLET, FILM COATED ORAL
Qty: 30 TABLET | Refills: 0 | Status: SHIPPED | OUTPATIENT
Start: 2018-06-26 | End: 2018-07-29 | Stop reason: SDUPTHER

## 2018-06-29 RX ORDER — QUETIAPINE FUMARATE 50 MG/1
100 TABLET, FILM COATED ORAL NIGHTLY
Qty: 30 TABLET | Refills: 0 | Status: SHIPPED | OUTPATIENT
Start: 2018-06-29 | End: 2018-07-15 | Stop reason: SDUPTHER

## 2018-06-29 RX ORDER — AMLODIPINE BESYLATE 5 MG/1
TABLET ORAL
Qty: 30 TABLET | Refills: 0 | Status: SHIPPED | OUTPATIENT
Start: 2018-06-29 | End: 2018-07-29 | Stop reason: SDUPTHER

## 2018-07-13 RX ORDER — METFORMIN HYDROCHLORIDE 500 MG/1
TABLET, EXTENDED RELEASE ORAL
Qty: 60 TABLET | Refills: 0 | Status: SHIPPED | OUTPATIENT
Start: 2018-07-13 | End: 2018-08-13 | Stop reason: SDUPTHER

## 2018-07-16 RX ORDER — QUETIAPINE FUMARATE 50 MG/1
100 TABLET, FILM COATED ORAL NIGHTLY
Qty: 30 TABLET | Refills: 0 | Status: SHIPPED | OUTPATIENT
Start: 2018-07-16 | End: 2018-07-29 | Stop reason: SDUPTHER

## 2018-07-17 ENCOUNTER — TELEPHONE (OUTPATIENT)
Dept: FAMILY MEDICINE CLINIC | Facility: CLINIC | Age: 53
End: 2018-07-17

## 2018-07-17 RX ORDER — HYDROCODONE BITARTRATE AND ACETAMINOPHEN 5; 325 MG/1; MG/1
1 TABLET ORAL EVERY 6 HOURS PRN
Qty: 30 TABLET | Refills: 0 | Status: SHIPPED | OUTPATIENT
Start: 2018-07-17 | End: 2018-08-15 | Stop reason: SDUPTHER

## 2018-07-30 RX ORDER — AMLODIPINE BESYLATE 5 MG/1
TABLET ORAL
Qty: 30 TABLET | Refills: 0 | Status: SHIPPED | OUTPATIENT
Start: 2018-07-30 | End: 2018-09-05 | Stop reason: SDUPTHER

## 2018-07-30 RX ORDER — EMPAGLIFLOZIN 10 MG/1
TABLET, FILM COATED ORAL
Qty: 30 TABLET | Refills: 0 | Status: SHIPPED | OUTPATIENT
Start: 2018-07-30 | End: 2018-09-05 | Stop reason: SDUPTHER

## 2018-07-30 RX ORDER — QUETIAPINE FUMARATE 50 MG/1
100 TABLET, FILM COATED ORAL NIGHTLY
Qty: 30 TABLET | Refills: 0 | Status: SHIPPED | OUTPATIENT
Start: 2018-07-30 | End: 2018-08-13 | Stop reason: SDUPTHER

## 2018-08-10 RX ORDER — DEXLANSOPRAZOLE 60 MG/1
1 CAPSULE, DELAYED RELEASE ORAL DAILY
Qty: 30 CAPSULE | Refills: 3 | Status: SHIPPED | OUTPATIENT
Start: 2018-08-10 | End: 2019-04-18 | Stop reason: ALTCHOICE

## 2018-08-14 RX ORDER — QUETIAPINE FUMARATE 50 MG/1
100 TABLET, FILM COATED ORAL NIGHTLY
Qty: 30 TABLET | Refills: 0 | Status: SHIPPED | OUTPATIENT
Start: 2018-08-14 | End: 2018-08-27 | Stop reason: SDUPTHER

## 2018-08-14 RX ORDER — METFORMIN HYDROCHLORIDE 500 MG/1
TABLET, EXTENDED RELEASE ORAL
Qty: 60 TABLET | Refills: 0 | Status: SHIPPED | OUTPATIENT
Start: 2018-08-14 | End: 2018-09-11 | Stop reason: SDUPTHER

## 2018-08-15 ENCOUNTER — TELEPHONE (OUTPATIENT)
Dept: FAMILY MEDICINE CLINIC | Facility: CLINIC | Age: 53
End: 2018-08-15

## 2018-08-15 RX ORDER — HYDROCODONE BITARTRATE AND ACETAMINOPHEN 5; 325 MG/1; MG/1
1 TABLET ORAL EVERY 6 HOURS PRN
Qty: 30 TABLET | Refills: 0 | Status: SHIPPED | OUTPATIENT
Start: 2018-08-15 | End: 2018-09-17 | Stop reason: SDUPTHER

## 2018-08-28 RX ORDER — QUETIAPINE FUMARATE 50 MG/1
100 TABLET, FILM COATED ORAL NIGHTLY
Qty: 30 TABLET | Refills: 0 | Status: SHIPPED | OUTPATIENT
Start: 2018-08-28 | End: 2018-09-11 | Stop reason: SDUPTHER

## 2018-08-30 RX ORDER — OMEPRAZOLE 40 MG/1
40 CAPSULE, DELAYED RELEASE ORAL DAILY
Qty: 30 CAPSULE | Refills: 5 | Status: SHIPPED | OUTPATIENT
Start: 2018-08-30 | End: 2019-03-06 | Stop reason: SDUPTHER

## 2018-09-05 RX ORDER — ATORVASTATIN CALCIUM 10 MG/1
TABLET, FILM COATED ORAL
Qty: 30 TABLET | Refills: 0 | Status: SHIPPED | OUTPATIENT
Start: 2018-09-05 | End: 2018-10-04 | Stop reason: SDUPTHER

## 2018-09-05 RX ORDER — AMLODIPINE BESYLATE 5 MG/1
TABLET ORAL
Qty: 30 TABLET | Refills: 0 | Status: SHIPPED | OUTPATIENT
Start: 2018-09-05 | End: 2018-10-11 | Stop reason: SDUPTHER

## 2018-09-05 RX ORDER — EMPAGLIFLOZIN 10 MG/1
TABLET, FILM COATED ORAL
Qty: 30 TABLET | Refills: 0 | Status: SHIPPED | OUTPATIENT
Start: 2018-09-05 | End: 2018-10-11 | Stop reason: SDUPTHER

## 2018-09-05 RX ORDER — GLIMEPIRIDE 2 MG/1
TABLET ORAL
Qty: 30 TABLET | Refills: 0 | Status: SHIPPED | OUTPATIENT
Start: 2018-09-05 | End: 2018-10-04 | Stop reason: SDUPTHER

## 2018-09-12 RX ORDER — METFORMIN HYDROCHLORIDE 500 MG/1
TABLET, EXTENDED RELEASE ORAL
Qty: 60 TABLET | Refills: 0 | Status: SHIPPED | OUTPATIENT
Start: 2018-09-12 | End: 2018-10-11 | Stop reason: SDUPTHER

## 2018-09-12 RX ORDER — QUETIAPINE FUMARATE 50 MG/1
100 TABLET, FILM COATED ORAL NIGHTLY
Qty: 30 TABLET | Refills: 0 | Status: SHIPPED | OUTPATIENT
Start: 2018-09-12 | End: 2018-09-27 | Stop reason: SDUPTHER

## 2018-09-17 ENCOUNTER — TELEPHONE (OUTPATIENT)
Dept: FAMILY MEDICINE CLINIC | Facility: CLINIC | Age: 53
End: 2018-09-17

## 2018-09-17 RX ORDER — HYDROCODONE BITARTRATE AND ACETAMINOPHEN 5; 325 MG/1; MG/1
1 TABLET ORAL EVERY 6 HOURS PRN
Qty: 30 TABLET | Refills: 0 | Status: SHIPPED | OUTPATIENT
Start: 2018-09-17 | End: 2018-10-26 | Stop reason: SDUPTHER

## 2018-09-28 RX ORDER — QUETIAPINE FUMARATE 50 MG/1
100 TABLET, FILM COATED ORAL NIGHTLY
Qty: 30 TABLET | Refills: 0 | Status: SHIPPED | OUTPATIENT
Start: 2018-09-28 | End: 2018-10-11 | Stop reason: SDUPTHER

## 2018-10-04 RX ORDER — ATORVASTATIN CALCIUM 10 MG/1
TABLET, FILM COATED ORAL
Qty: 30 TABLET | Refills: 5 | Status: SHIPPED | OUTPATIENT
Start: 2018-10-04 | End: 2019-04-05 | Stop reason: SDUPTHER

## 2018-10-04 RX ORDER — GLIMEPIRIDE 2 MG/1
TABLET ORAL
Qty: 30 TABLET | Refills: 5 | Status: SHIPPED | OUTPATIENT
Start: 2018-10-04 | End: 2018-10-26 | Stop reason: SDUPTHER

## 2018-10-12 RX ORDER — EMPAGLIFLOZIN 10 MG/1
TABLET, FILM COATED ORAL
Qty: 30 TABLET | Refills: 5 | Status: SHIPPED | OUTPATIENT
Start: 2018-10-12 | End: 2019-03-15 | Stop reason: SDUPTHER

## 2018-10-12 RX ORDER — METFORMIN HYDROCHLORIDE 500 MG/1
TABLET, EXTENDED RELEASE ORAL
Qty: 60 TABLET | Refills: 5 | Status: SHIPPED | OUTPATIENT
Start: 2018-10-12 | End: 2018-10-26 | Stop reason: SDUPTHER

## 2018-10-12 RX ORDER — QUETIAPINE FUMARATE 50 MG/1
100 TABLET, FILM COATED ORAL NIGHTLY
Qty: 30 TABLET | Refills: 5 | Status: SHIPPED | OUTPATIENT
Start: 2018-10-12 | End: 2019-01-10 | Stop reason: SDUPTHER

## 2018-10-12 RX ORDER — AMLODIPINE BESYLATE 5 MG/1
TABLET ORAL
Qty: 30 TABLET | Refills: 5 | Status: SHIPPED | OUTPATIENT
Start: 2018-10-12 | End: 2019-05-05 | Stop reason: SDUPTHER

## 2018-10-26 ENCOUNTER — OFFICE VISIT (OUTPATIENT)
Dept: FAMILY MEDICINE CLINIC | Facility: CLINIC | Age: 53
End: 2018-10-26

## 2018-10-26 VITALS
BODY MASS INDEX: 39 KG/M2 | SYSTOLIC BLOOD PRESSURE: 150 MMHG | WEIGHT: 272.4 LBS | OXYGEN SATURATION: 96 % | HEART RATE: 96 BPM | DIASTOLIC BLOOD PRESSURE: 76 MMHG | HEIGHT: 70 IN | TEMPERATURE: 98.3 F

## 2018-10-26 DIAGNOSIS — E11.9 DIABETES MELLITUS WITHOUT COMPLICATION (HCC): ICD-10-CM

## 2018-10-26 DIAGNOSIS — I10 HYPERTENSION, ESSENTIAL: ICD-10-CM

## 2018-10-26 DIAGNOSIS — J40 BRONCHITIS: Primary | ICD-10-CM

## 2018-10-26 DIAGNOSIS — J01.00 ACUTE NON-RECURRENT MAXILLARY SINUSITIS: ICD-10-CM

## 2018-10-26 DIAGNOSIS — E78.5 HYPERLIPIDEMIA, UNSPECIFIED HYPERLIPIDEMIA TYPE: ICD-10-CM

## 2018-10-26 LAB
ALBUMIN SERPL-MCNC: 4.8 G/DL (ref 3.5–5.2)
ALBUMIN UR-MCNC: 20 MG/L (ref 0–20)
ALBUMIN/GLOB SERPL: 1.4 G/DL
ALP SERPL-CCNC: 79 U/L (ref 39–117)
ALT SERPL W P-5'-P-CCNC: 51 U/L (ref 1–41)
ANION GAP SERPL CALCULATED.3IONS-SCNC: 11.3 MMOL/L
AST SERPL-CCNC: 31 U/L (ref 1–40)
BILIRUB SERPL-MCNC: 0.3 MG/DL (ref 0.1–1.2)
BUN BLD-MCNC: 20 MG/DL (ref 6–20)
BUN/CREAT SERPL: 25.6 (ref 7–25)
CALCIUM SPEC-SCNC: 10.1 MG/DL (ref 8.6–10.5)
CHLORIDE SERPL-SCNC: 97 MMOL/L (ref 98–107)
CHOLEST SERPL-MCNC: 145 MG/DL (ref 0–200)
CO2 SERPL-SCNC: 28.7 MMOL/L (ref 22–29)
CREAT BLD-MCNC: 0.78 MG/DL (ref 0.76–1.27)
GFR SERPL CREATININE-BSD FRML MDRD: 126 ML/MIN/1.73
GLOBULIN UR ELPH-MCNC: 3.4 GM/DL
GLUCOSE BLD-MCNC: 172 MG/DL (ref 65–99)
HBA1C MFR BLD: 8.87 % (ref 4.8–5.6)
HDLC SERPL-MCNC: 36 MG/DL (ref 40–60)
LDLC SERPL CALC-MCNC: 57 MG/DL (ref 0–100)
LDLC/HDLC SERPL: 1.59 {RATIO}
POTASSIUM BLD-SCNC: 4.7 MMOL/L (ref 3.5–5.2)
PROT SERPL-MCNC: 8.2 G/DL (ref 6–8.5)
SODIUM BLD-SCNC: 137 MMOL/L (ref 136–145)
TRIGL SERPL-MCNC: 259 MG/DL (ref 0–150)
VLDLC SERPL-MCNC: 51.8 MG/DL (ref 5–40)

## 2018-10-26 PROCEDURE — 99214 OFFICE O/P EST MOD 30 MIN: CPT | Performed by: INTERNAL MEDICINE

## 2018-10-26 PROCEDURE — 82043 UR ALBUMIN QUANTITATIVE: CPT | Performed by: INTERNAL MEDICINE

## 2018-10-26 PROCEDURE — 36415 COLL VENOUS BLD VENIPUNCTURE: CPT | Performed by: INTERNAL MEDICINE

## 2018-10-26 PROCEDURE — 83036 HEMOGLOBIN GLYCOSYLATED A1C: CPT | Performed by: INTERNAL MEDICINE

## 2018-10-26 PROCEDURE — 90674 CCIIV4 VAC NO PRSV 0.5 ML IM: CPT | Performed by: INTERNAL MEDICINE

## 2018-10-26 PROCEDURE — 80061 LIPID PANEL: CPT | Performed by: INTERNAL MEDICINE

## 2018-10-26 PROCEDURE — 80053 COMPREHEN METABOLIC PANEL: CPT | Performed by: INTERNAL MEDICINE

## 2018-10-26 PROCEDURE — 90471 IMMUNIZATION ADMIN: CPT | Performed by: INTERNAL MEDICINE

## 2018-10-26 RX ORDER — LEVOFLOXACIN 500 MG/1
500 TABLET, FILM COATED ORAL DAILY
Qty: 10 TABLET | Refills: 0 | Status: SHIPPED | OUTPATIENT
Start: 2018-10-26 | End: 2019-04-18

## 2018-10-26 RX ORDER — BENZONATATE 100 MG/1
CAPSULE ORAL
Qty: 30 CAPSULE | Refills: 0 | Status: SHIPPED | OUTPATIENT
Start: 2018-10-26 | End: 2019-04-18

## 2018-10-26 RX ORDER — HYDROCODONE BITARTRATE AND ACETAMINOPHEN 5; 325 MG/1; MG/1
1 TABLET ORAL EVERY 6 HOURS PRN
Qty: 30 TABLET | Refills: 0 | Status: SHIPPED | OUTPATIENT
Start: 2018-10-26 | End: 2018-11-26

## 2018-10-26 NOTE — PROGRESS NOTES
Subjective   Go Cho is a 53 y.o. male.   Patient with recent bronchitis some sinus drainage and pressure more so in the maxillary sinuses no reported temperature  History of Present Illness   Coughing bronchitis symptoms began with sinuses now in chest no reported temperature with this also needs follow-up on his diabetes and blood pressure and lipids doing fairly well with that blood pressures controlled to his knowledge we'll have a monitor at home.  His is doing fairly well at home by patient's observation.  Without new drug allergies.  I.e. done.  His smoker no heavy alcohol.  Patient has family history for heart disease.  Review of Systems   All other systems reviewed and are negative.      Objective   Vitals:    10/26/18 1252   BP: 150/76   Pulse: 96   Temp: 98.3 °F (36.8 °C)   SpO2: 96%   Weight: 124 kg (272 lb 6.4 oz)     Physical Exam   Constitutional: He appears well-developed and well-nourished.   HENT:   Head: Normocephalic.   Right Ear: External ear normal.   Left Ear: External ear normal.   Throat with minimal irritation excess with drainage.  Does have pressure palpation over maxillary sinuses   Eyes: Pupils are equal, round, and reactive to light. Conjunctivae are normal.   Cardiovascular: Normal rate, regular rhythm and normal heart sounds.    Pulmonary/Chest: Effort normal and breath sounds normal.   Abdominal: Soft. Bowel sounds are normal.   Neurological: He is alert.   Unremarkable gait and station   Skin: Skin is warm and dry.   Nursing note and vitals reviewed.      Lab Results   Component Value Date    INR 1.17 (H) 10/02/2016       Procedures    Assessment/Plan 1.  Bronchitis    2.  Maxillary sinusitis plan Levaquin 500 milligrams daily for this also for the bronchitis can take Tessalon pearls follow-up if symptoms worsen.  Well in 10 days    3.  Type 2 diabetes plan await pending lab if good recheck 6 months    4.  Hyperlipidemia await pending lab recheck 6 months if okay    5.   Hypertension continue to monitor blood pressure you've it is ranging above 140/90 he is goes back we will upper adjust his medication    Much of this encounter note is an electronic transcription/translation of spoken language to printed text.  The electronic translation of spoken language may permit erroneous, or at times, nonsensical words or phrases to be inadvertently transcribed.  Although I have reviewed the note for such errors, some may still exist. If there are questions or for further clarification, please contact me.      Flu shot

## 2018-11-01 DIAGNOSIS — R79.89 ELEVATED LFTS: Primary | ICD-10-CM

## 2018-11-08 RX ORDER — LISINOPRIL 5 MG/1
TABLET ORAL
Qty: 30 TABLET | Refills: 11 | Status: SHIPPED | OUTPATIENT
Start: 2018-11-08 | End: 2019-03-15 | Stop reason: SDUPTHER

## 2018-11-26 ENCOUNTER — TELEPHONE (OUTPATIENT)
Dept: FAMILY MEDICINE CLINIC | Facility: CLINIC | Age: 53
End: 2018-11-26

## 2018-11-26 ENCOUNTER — LAB (OUTPATIENT)
Dept: FAMILY MEDICINE CLINIC | Facility: CLINIC | Age: 53
End: 2018-11-26

## 2018-11-26 DIAGNOSIS — R79.89 ELEVATED LFTS: ICD-10-CM

## 2018-11-26 LAB
HAV IGM SERPL QL IA: NORMAL
HBV CORE IGM SERPL QL IA: NORMAL
HBV SURFACE AG SERPL QL IA: NORMAL
HCV AB SER DONR QL: NORMAL

## 2018-11-26 PROCEDURE — 80074 ACUTE HEPATITIS PANEL: CPT | Performed by: INTERNAL MEDICINE

## 2018-11-26 PROCEDURE — 36415 COLL VENOUS BLD VENIPUNCTURE: CPT | Performed by: INTERNAL MEDICINE

## 2018-11-26 RX ORDER — HYDROCODONE BITARTRATE AND ACETAMINOPHEN 5; 325 MG/1; MG/1
1 TABLET ORAL EVERY 6 HOURS PRN
Qty: 30 TABLET | Refills: 0 | Status: SHIPPED | OUTPATIENT
Start: 2018-11-26 | End: 2018-12-18

## 2018-12-18 ENCOUNTER — TELEPHONE (OUTPATIENT)
Dept: FAMILY MEDICINE CLINIC | Facility: CLINIC | Age: 53
End: 2018-12-18

## 2018-12-18 RX ORDER — HYDROCODONE BITARTRATE AND ACETAMINOPHEN 5; 325 MG/1; MG/1
1 TABLET ORAL EVERY 6 HOURS PRN
Qty: 30 TABLET | Refills: 0 | Status: SHIPPED | OUTPATIENT
Start: 2018-12-18 | End: 2019-01-18

## 2019-01-09 ENCOUNTER — TELEPHONE (OUTPATIENT)
Dept: FAMILY MEDICINE CLINIC | Facility: CLINIC | Age: 54
End: 2019-01-09

## 2019-01-11 RX ORDER — QUETIAPINE FUMARATE 50 MG/1
100 TABLET, FILM COATED ORAL NIGHTLY
Qty: 30 TABLET | Refills: 0 | Status: SHIPPED | OUTPATIENT
Start: 2019-01-11 | End: 2019-01-26 | Stop reason: SDUPTHER

## 2019-01-18 ENCOUNTER — TELEPHONE (OUTPATIENT)
Dept: FAMILY MEDICINE CLINIC | Facility: CLINIC | Age: 54
End: 2019-01-18

## 2019-01-18 RX ORDER — HYDROCODONE BITARTRATE AND ACETAMINOPHEN 5; 325 MG/1; MG/1
1 TABLET ORAL EVERY 6 HOURS PRN
Qty: 30 TABLET | Refills: 0 | Status: SHIPPED | OUTPATIENT
Start: 2019-01-18 | End: 2019-03-04

## 2019-01-28 RX ORDER — QUETIAPINE FUMARATE 50 MG/1
100 TABLET, FILM COATED ORAL NIGHTLY
Qty: 30 TABLET | Refills: 0 | Status: SHIPPED | OUTPATIENT
Start: 2019-01-28 | End: 2019-02-09 | Stop reason: SDUPTHER

## 2019-02-11 RX ORDER — QUETIAPINE FUMARATE 50 MG/1
100 TABLET, FILM COATED ORAL NIGHTLY
Qty: 30 TABLET | Refills: 0 | Status: SHIPPED | OUTPATIENT
Start: 2019-02-11 | End: 2019-02-24 | Stop reason: SDUPTHER

## 2019-02-12 RX ORDER — METFORMIN HYDROCHLORIDE 500 MG/1
500 TABLET, EXTENDED RELEASE ORAL 3 TIMES DAILY
Qty: 90 TABLET | Refills: 5 | Status: SHIPPED | OUTPATIENT
Start: 2019-02-12 | End: 2019-02-13 | Stop reason: SDUPTHER

## 2019-02-13 RX ORDER — METFORMIN HYDROCHLORIDE 500 MG/1
500 TABLET, EXTENDED RELEASE ORAL 3 TIMES DAILY
Qty: 90 TABLET | Refills: 5 | Status: SHIPPED | OUTPATIENT
Start: 2019-02-13 | End: 2019-03-04 | Stop reason: DRUGHIGH

## 2019-02-25 RX ORDER — QUETIAPINE FUMARATE 50 MG/1
100 TABLET, FILM COATED ORAL NIGHTLY
Qty: 30 TABLET | Refills: 0 | Status: SHIPPED | OUTPATIENT
Start: 2019-02-25 | End: 2019-03-05 | Stop reason: SDUPTHER

## 2019-03-04 ENCOUNTER — TELEPHONE (OUTPATIENT)
Dept: FAMILY MEDICINE CLINIC | Facility: CLINIC | Age: 54
End: 2019-03-04

## 2019-03-04 RX ORDER — METFORMIN HYDROCHLORIDE 500 MG/1
500 TABLET, EXTENDED RELEASE ORAL 3 TIMES DAILY
Qty: 90 TABLET | Refills: 5 | Status: SHIPPED | OUTPATIENT
Start: 2019-03-04 | End: 2019-08-31 | Stop reason: SDUPTHER

## 2019-03-04 RX ORDER — HYDROCODONE BITARTRATE AND ACETAMINOPHEN 5; 325 MG/1; MG/1
1 TABLET ORAL EVERY 6 HOURS PRN
Qty: 30 TABLET | Refills: 0 | Status: SHIPPED | OUTPATIENT
Start: 2019-03-04 | End: 2019-04-09

## 2019-03-04 RX ORDER — METFORMIN HYDROCHLORIDE 500 MG/1
TABLET, EXTENDED RELEASE ORAL
Qty: 60 TABLET | Refills: 0 | Status: SHIPPED | OUTPATIENT
Start: 2019-03-04 | End: 2019-03-04 | Stop reason: DRUGHIGH

## 2019-03-06 RX ORDER — OMEPRAZOLE 40 MG/1
40 CAPSULE, DELAYED RELEASE ORAL DAILY
Qty: 30 CAPSULE | Refills: 0 | Status: SHIPPED | OUTPATIENT
Start: 2019-03-06 | End: 2019-04-05 | Stop reason: SDUPTHER

## 2019-03-06 RX ORDER — QUETIAPINE FUMARATE 50 MG/1
100 TABLET, FILM COATED ORAL NIGHTLY
Qty: 30 TABLET | Refills: 0 | Status: SHIPPED | OUTPATIENT
Start: 2019-03-06 | End: 2019-03-27 | Stop reason: SDUPTHER

## 2019-03-15 ENCOUNTER — OFFICE VISIT (OUTPATIENT)
Dept: FAMILY MEDICINE CLINIC | Facility: CLINIC | Age: 54
End: 2019-03-15

## 2019-03-15 VITALS
BODY MASS INDEX: 38.51 KG/M2 | WEIGHT: 269 LBS | TEMPERATURE: 99.3 F | SYSTOLIC BLOOD PRESSURE: 160 MMHG | HEIGHT: 70 IN | OXYGEN SATURATION: 99 % | DIASTOLIC BLOOD PRESSURE: 70 MMHG | HEART RATE: 100 BPM

## 2019-03-15 DIAGNOSIS — R07.81 PLEURITIC CHEST PAIN: ICD-10-CM

## 2019-03-15 DIAGNOSIS — Z72.0 TOBACCO ABUSE: ICD-10-CM

## 2019-03-15 DIAGNOSIS — R10.9 ACUTE FLANK PAIN: Primary | ICD-10-CM

## 2019-03-15 DIAGNOSIS — I10 HYPERTENSION, ESSENTIAL: ICD-10-CM

## 2019-03-15 DIAGNOSIS — R93.89 ABNORMAL CHEST X-RAY: Primary | ICD-10-CM

## 2019-03-15 DIAGNOSIS — E78.2 MIXED HYPERLIPIDEMIA: ICD-10-CM

## 2019-03-15 DIAGNOSIS — R93.89 ABNORMAL CHEST X-RAY: ICD-10-CM

## 2019-03-15 DIAGNOSIS — E11.9 DIABETES MELLITUS WITHOUT COMPLICATION (HCC): ICD-10-CM

## 2019-03-15 LAB
ALBUMIN SERPL-MCNC: 4.9 G/DL (ref 3.5–5.2)
ALBUMIN UR-MCNC: 50 MG/L (ref 0–20)
ALBUMIN/GLOB SERPL: 1.4 G/DL
ALP SERPL-CCNC: 74 U/L (ref 39–117)
ALT SERPL W P-5'-P-CCNC: 49 U/L (ref 1–41)
ANION GAP SERPL CALCULATED.3IONS-SCNC: 11.2 MMOL/L
AST SERPL-CCNC: 32 U/L (ref 1–40)
BACTERIA UR QL AUTO: NORMAL /HPF
BILIRUB SERPL-MCNC: 0.3 MG/DL (ref 0.1–1.2)
BILIRUB UR QL STRIP: NEGATIVE
BUN BLD-MCNC: 14 MG/DL (ref 6–20)
BUN/CREAT SERPL: 20.6 (ref 7–25)
CALCIUM SPEC-SCNC: 10.2 MG/DL (ref 8.6–10.5)
CHLORIDE SERPL-SCNC: 97 MMOL/L (ref 98–107)
CHOLEST SERPL-MCNC: 128 MG/DL (ref 0–200)
CLARITY UR: CLEAR
CO2 SERPL-SCNC: 31.8 MMOL/L (ref 22–29)
COLOR UR: YELLOW
CREAT BLD-MCNC: 0.68 MG/DL (ref 0.76–1.27)
ERYTHROCYTE [DISTWIDTH] IN BLOOD BY AUTOMATED COUNT: 13.7 % (ref 12.3–15.4)
GFR SERPL CREATININE-BSD FRML MDRD: 147 ML/MIN/1.73
GLOBULIN UR ELPH-MCNC: 3.4 GM/DL
GLUCOSE BLD-MCNC: 260 MG/DL (ref 65–99)
GLUCOSE UR STRIP-MCNC: ABNORMAL MG/DL
HBA1C MFR BLD: 9.7 % (ref 4.8–5.6)
HCT VFR BLD AUTO: 49.4 % (ref 37.5–51)
HDLC SERPL-MCNC: 41 MG/DL (ref 40–60)
HGB BLD-MCNC: 15.9 G/DL (ref 13–17.7)
HGB UR QL STRIP.AUTO: NEGATIVE
KETONES UR QL STRIP: NEGATIVE
LDLC SERPL CALC-MCNC: 67 MG/DL (ref 0–100)
LDLC/HDLC SERPL: 1.62 {RATIO}
LEUKOCYTE ESTERASE UR QL STRIP.AUTO: NEGATIVE
LYMPHOCYTES # BLD AUTO: 2.8 10*3/MM3 (ref 0.7–3.1)
LYMPHOCYTES NFR BLD AUTO: 31.5 % (ref 19.6–45.3)
MCH RBC QN AUTO: 28.6 PG (ref 26.6–33)
MCHC RBC AUTO-ENTMCNC: 32.2 G/DL (ref 31.5–35.7)
MCV RBC AUTO: 88.8 FL (ref 79–97)
MONOCYTES # BLD AUTO: 0.6 10*3/MM3 (ref 0.1–0.9)
MONOCYTES NFR BLD AUTO: 6.6 % (ref 5–12)
NEUTROPHILS # BLD AUTO: 5.5 10*3/MM3 (ref 1.4–7)
NEUTROPHILS NFR BLD AUTO: 61.9 % (ref 42.7–76)
NITRITE UR QL STRIP: NEGATIVE
PH UR STRIP.AUTO: 6 [PH] (ref 4.6–8)
PLATELET # BLD AUTO: 207 10*3/MM3 (ref 140–450)
PMV BLD AUTO: 8.7 FL (ref 6–12)
POTASSIUM BLD-SCNC: 4.3 MMOL/L (ref 3.5–5.2)
PROT SERPL-MCNC: 8.3 G/DL (ref 6–8.5)
PROT UR QL STRIP: ABNORMAL
RBC # BLD AUTO: 5.56 10*6/MM3 (ref 4.14–5.8)
RBC # UR: NORMAL /HPF
REF LAB TEST METHOD: NORMAL
SODIUM BLD-SCNC: 140 MMOL/L (ref 136–145)
SP GR UR STRIP: 1.01 (ref 1–1.03)
SQUAMOUS #/AREA URNS HPF: NORMAL /HPF
TRIGL SERPL-MCNC: 102 MG/DL (ref 0–150)
UROBILINOGEN UR QL STRIP: ABNORMAL
VLDLC SERPL-MCNC: 20.4 MG/DL (ref 5–40)
WBC NRBC COR # BLD: 8.9 10*3/MM3 (ref 3.4–10.8)
WBC UR QL AUTO: NORMAL /HPF

## 2019-03-15 PROCEDURE — 80053 COMPREHEN METABOLIC PANEL: CPT | Performed by: INTERNAL MEDICINE

## 2019-03-15 PROCEDURE — 81001 URINALYSIS AUTO W/SCOPE: CPT | Performed by: INTERNAL MEDICINE

## 2019-03-15 PROCEDURE — 85025 COMPLETE CBC W/AUTO DIFF WBC: CPT | Performed by: INTERNAL MEDICINE

## 2019-03-15 PROCEDURE — 71046 X-RAY EXAM CHEST 2 VIEWS: CPT | Performed by: INTERNAL MEDICINE

## 2019-03-15 PROCEDURE — 82043 UR ALBUMIN QUANTITATIVE: CPT | Performed by: INTERNAL MEDICINE

## 2019-03-15 PROCEDURE — 99214 OFFICE O/P EST MOD 30 MIN: CPT | Performed by: INTERNAL MEDICINE

## 2019-03-15 PROCEDURE — 83036 HEMOGLOBIN GLYCOSYLATED A1C: CPT | Performed by: INTERNAL MEDICINE

## 2019-03-15 PROCEDURE — 36415 COLL VENOUS BLD VENIPUNCTURE: CPT | Performed by: INTERNAL MEDICINE

## 2019-03-15 PROCEDURE — 80061 LIPID PANEL: CPT | Performed by: INTERNAL MEDICINE

## 2019-03-15 NOTE — PROGRESS NOTES
Subjective   Go Cho is a 54 y.o. male.   Patient complains of pleuritic chest pain more left posteriorly also having flank pain the left but no known hematuria wants to be checked out for that.  Also needs follow-up on diabetes blood pressure lipids  History of Present Illness   Patient not checking his blood sugars recently.  We will get updated lab values having some cough as well discomfort actually more of a left flank discomfort no personal history of kidney stones does not see blood in urine no increased urine frequency.  Blood pressure slightly elevated today we will have him monitor at home does have some sinus trouble periodically.  Does have known sleep apnea she is CPAP regularly.  Concerned he might have something more serious going on but no reported weight loss night sweats change in bowel habits review chart make sure he is current on his screenings.  And await pending lab for further clues.  There is negative family history for pancreatic cancer.  Patient current smoker wishes to try Chantix again.  Quarter pack per day tried before as well as Wellbutrin without success.  Patient has family history for heart disease.  Drug allergies are none    Review of Systems   Respiratory: Positive for cough.    Musculoskeletal: Positive for back pain.       Objective   Vitals:    03/15/19 1453   BP: 160/70   Pulse: 100   Temp: 99.3 °F (37.4 °C)   SpO2: 99%   Weight: 122 kg (269 lb)     Physical Exam   Constitutional: He appears well-developed and well-nourished.   HENT:   Head: Normocephalic and atraumatic.   Eyes: Conjunctivae are normal. Pupils are equal, round, and reactive to light.   Neck:   No carotid bruits   Cardiovascular: Normal rate, regular rhythm and normal heart sounds.   Pulmonary/Chest: Effort normal and breath sounds normal.   Abdominal: Soft. Bowel sounds are normal.   Masses or tenderness noted including deep palpation.  Negative CVA tenderness   Neurological: He is alert.    Unremarkable gait and station   Skin: Skin is warm and dry.   Nursing note and vitals reviewed.      Lab Results   Component Value Date    INR 1.17 (H) 10/02/2016       Procedures  Chest x-ray AP and lateral obtained.  No active parenchymal infiltrate seen.  We do have prominent right hilum.  Minimal spurring seen on lateral view of thoracic vertebrae.  No comparison x-rays available.  Indication for x-rays pleuritic chest pain.  We will proceed with CT of chest  Assessment/Plan   1.  Left flank pain plan await pending urine and other lab    2.  Pleuritic chest pain plan CT of chest    3.  Abnormal chest x-ray prominent right hilum plan CT of chest of her percent #2    4.  Hypertension plan observation for now is elevated today heart rate is in 1 week's time    5.  Hyperlipidemia with pending lab if satisfactory recheck 6 months    6.  Type 2 diabetes weight pending hemoglobin A1c if satisfactory recheck in 6 months    7.  Tobacco abuse plan Chantix starter pack    Much of this encounter note is an electronic transcription/translation of spoken language to printed text.  The electronic translation of spoken language may permit erroneous, or at times, nonsensical words or phrases to be inadvertently transcribed.  Although I have reviewed the note for such errors, some may still exist. If there are questions or for further clarification, please contact me.

## 2019-03-21 ENCOUNTER — TELEPHONE (OUTPATIENT)
Dept: FAMILY MEDICINE CLINIC | Facility: CLINIC | Age: 54
End: 2019-03-21

## 2019-03-21 NOTE — TELEPHONE ENCOUNTER
Switch to for for Farxiga mg we should have some samples from try as well.  Call in sugars in 5 days

## 2019-03-24 ENCOUNTER — HOSPITAL ENCOUNTER (OUTPATIENT)
Dept: CT IMAGING | Facility: HOSPITAL | Age: 54
Discharge: HOME | End: 2019-03-24
Admitting: INTERNAL MEDICINE

## 2019-03-24 DIAGNOSIS — R93.89 ABNORMAL CHEST X-RAY: ICD-10-CM

## 2019-03-24 LAB — CREAT BLDA-MCNC: 0.6 MG/DL (ref 0.6–1.3)

## 2019-03-24 PROCEDURE — 71260 CT THORAX DX C+: CPT

## 2019-03-24 PROCEDURE — 82565 ASSAY OF CREATININE: CPT

## 2019-03-24 PROCEDURE — 25010000002 IOPAMIDOL 61 % SOLUTION: Performed by: INTERNAL MEDICINE

## 2019-03-24 RX ADMIN — IOPAMIDOL 80 ML: 612 INJECTION, SOLUTION INTRAVENOUS at 10:55

## 2019-03-26 DIAGNOSIS — K76.0 FATTY LIVER: Primary | ICD-10-CM

## 2019-03-26 DIAGNOSIS — R91.1 LUNG NODULE: ICD-10-CM

## 2019-03-28 RX ORDER — QUETIAPINE FUMARATE 50 MG/1
100 TABLET, FILM COATED ORAL NIGHTLY
Qty: 30 TABLET | Refills: 3 | Status: SHIPPED | OUTPATIENT
Start: 2019-03-28 | End: 2019-05-25 | Stop reason: SDUPTHER

## 2019-04-05 RX ORDER — OMEPRAZOLE 40 MG/1
40 CAPSULE, DELAYED RELEASE ORAL DAILY
Qty: 30 CAPSULE | Refills: 5 | Status: SHIPPED | OUTPATIENT
Start: 2019-04-05 | End: 2021-01-13 | Stop reason: SDUPTHER

## 2019-04-05 RX ORDER — GLIMEPIRIDE 2 MG/1
TABLET ORAL
Qty: 30 TABLET | Refills: 5 | Status: SHIPPED | OUTPATIENT
Start: 2019-04-05 | End: 2019-09-30 | Stop reason: SDUPTHER

## 2019-04-05 RX ORDER — ATORVASTATIN CALCIUM 10 MG/1
TABLET, FILM COATED ORAL
Qty: 30 TABLET | Refills: 5 | Status: SHIPPED | OUTPATIENT
Start: 2019-04-05 | End: 2019-10-30 | Stop reason: SDUPTHER

## 2019-04-08 RX ORDER — OMEPRAZOLE 40 MG/1
40 CAPSULE, DELAYED RELEASE ORAL DAILY
Qty: 30 CAPSULE | Refills: 0 | Status: SHIPPED | OUTPATIENT
Start: 2019-04-08 | End: 2019-04-18 | Stop reason: SDUPTHER

## 2019-04-09 ENCOUNTER — TELEPHONE (OUTPATIENT)
Dept: FAMILY MEDICINE CLINIC | Facility: CLINIC | Age: 54
End: 2019-04-09

## 2019-04-09 RX ORDER — HYDROCODONE BITARTRATE AND ACETAMINOPHEN 5; 325 MG/1; MG/1
1 TABLET ORAL EVERY 6 HOURS PRN
Qty: 30 TABLET | Refills: 0 | Status: SHIPPED | OUTPATIENT
Start: 2019-04-09 | End: 2019-05-08

## 2019-04-16 ENCOUNTER — TRANSCRIBE ORDERS (OUTPATIENT)
Dept: ADMINISTRATIVE | Facility: HOSPITAL | Age: 54
End: 2019-04-16

## 2019-04-16 DIAGNOSIS — R91.1 LUNG NODULE: Primary | ICD-10-CM

## 2019-04-18 ENCOUNTER — LAB (OUTPATIENT)
Dept: LAB | Facility: HOSPITAL | Age: 54
End: 2019-04-18

## 2019-04-18 ENCOUNTER — OFFICE VISIT (OUTPATIENT)
Dept: GASTROENTEROLOGY | Facility: CLINIC | Age: 54
End: 2019-04-18

## 2019-04-18 VITALS
SYSTOLIC BLOOD PRESSURE: 155 MMHG | DIASTOLIC BLOOD PRESSURE: 89 MMHG | HEIGHT: 70 IN | HEART RATE: 87 BPM | BODY MASS INDEX: 37.22 KG/M2 | WEIGHT: 260 LBS

## 2019-04-18 DIAGNOSIS — R79.89 ABNORMAL LFTS (LIVER FUNCTION TESTS): Primary | ICD-10-CM

## 2019-04-18 DIAGNOSIS — R79.89 ABNORMAL LFTS (LIVER FUNCTION TESTS): ICD-10-CM

## 2019-04-18 LAB
BACTERIA UR QL AUTO: NORMAL /HPF
BILIRUB UR QL STRIP: NEGATIVE
CLARITY UR: CLEAR
COLOR UR: YELLOW
GLUCOSE UR STRIP-MCNC: ABNORMAL MG/DL
HGB UR QL STRIP.AUTO: NEGATIVE
HYALINE CASTS UR QL AUTO: NORMAL /LPF
KETONES UR QL STRIP: NEGATIVE
LEUKOCYTE ESTERASE UR QL STRIP.AUTO: NEGATIVE
NITRITE UR QL STRIP: NEGATIVE
PH UR STRIP.AUTO: 5.5 [PH] (ref 5–8)
PROT UR QL STRIP: NEGATIVE
RBC # UR: NORMAL /HPF
REF LAB TEST METHOD: NORMAL
SP GR UR STRIP: 1.03 (ref 1–1.03)
SQUAMOUS #/AREA URNS HPF: NORMAL /HPF
UROBILINOGEN UR QL STRIP: ABNORMAL
WBC UR QL AUTO: NORMAL /HPF

## 2019-04-18 PROCEDURE — 81001 URINALYSIS AUTO W/SCOPE: CPT

## 2019-04-18 PROCEDURE — 99213 OFFICE O/P EST LOW 20 MIN: CPT | Performed by: INTERNAL MEDICINE

## 2019-04-18 NOTE — PROGRESS NOTES
Subjective   Go Cho is a 54 y.o.. male is here today for follow-up.    Chief Complaint   Patient presents with   • Abnormal Imaging     Fatty Liver       History of Present Illness  I have seen patient before for issues related to GERD and intractable hiccups.  Patient was recently noted to have abnormal liver chemistries and imaging studies demonstrated fatty liver.  He does not drink to any substantial amount.  He does have type 2 diabetes.  He says he had one episode of dark urine and wonders whether he might have passed some blood at the time.    The following portions of the patient's history were reviewed and updated as appropriate: allergies, current medications, past family history, past medical history, past social history, past surgical history and problem list.      Current Outpatient Medications:   •  albuterol (PROVENTIL) (2.5 MG/3ML) 0.083% nebulizer solution, Take 2.5 mg by nebulization Every 4 (Four) Hours As Needed for wheezing., Disp: 120 vial, Rfl: 12  •  amLODIPine (NORVASC) 5 MG tablet, TAKE 1 TABLET BY MOUTH EVERY DAY, Disp: 30 tablet, Rfl: 5  •  aspirin 81 MG EC tablet, Take 81 mg by mouth Daily., Disp: , Rfl:   •  atorvastatin (LIPITOR) 10 MG tablet, TAKE 1 TABLET BY MOUTH EVERY DAY, Disp: 30 tablet, Rfl: 5  •  dapagliflozin (FARXIGA) 5 MG tablet tablet, Take 1 tablet by mouth Daily., Disp: 30 tablet, Rfl: 5  •  glimepiride (AMARYL) 2 MG tablet, TAKE 1 TABLET BY MOUTH EVERY MORNING BEFORE BREAKFAST, Disp: 30 tablet, Rfl: 0  •  glimepiride (AMARYL) 2 MG tablet, TAKE 1 TABLET BY MOUTH EVERY MORNING BEFORE BREAKFAST, Disp: 30 tablet, Rfl: 5  •  glucose blood test strip, Test once daily, Disp: 30 each, Rfl: 12  •  HYDROcodone-acetaminophen (NORCO) 5-325 MG per tablet, Take 1 tablet by mouth Every 6 (Six) Hours As Needed (Orthopedic pain)., Disp: 30 tablet, Rfl: 0  •  Lancets (FREESTYLE) lancets, Test twice daily, Disp: 100 each, Rfl: 12  •  lisinopril (PRINIVIL,ZESTRIL) 5 MG tablet,  Take 1 tablet by mouth Daily. (Patient taking differently: Take 20 mg by mouth Daily.), Disp: 30 tablet, Rfl: 5  •  metFORMIN ER (GLUCOPHAGE-XR) 500 MG 24 hr tablet, Take 1 tablet by mouth 3 (Three) Times a Day., Disp: 90 tablet, Rfl: 5  •  NON FORMULARY, BX4 colon cleanse supplement, Disp: , Rfl:   •  omeprazole (priLOSEC) 40 MG capsule, TAKE 1 CAPSULE BY MOUTH DAILY, Disp: 30 capsule, Rfl: 5  •  QUEtiapine (SEROquel) 50 MG tablet, TAKE 2 TABLETS BY MOUTH EVERY NIGHT, Disp: 30 tablet, Rfl: 3  •  Respiratory Therapy Supplies (NEBULIZER/TUBING/MOUTHPIECE) kit, ONE DAILY, Disp: 30 each, Rfl: 11  •  varenicline (CHANTIX STARTING MONTH PAK) 0.5 MG X 11 & 1 MG X 42 tablet, Take 0.5 mg one daily on days 1-3 and and 0.5 mg twice daily on days 4-7.Then 1 mg twice daily for a total of 12 weeks., Disp: 53 tablet, Rfl: 0  •  sildenafil (VIAGRA) 100 MG tablet, Take 1 tablet by mouth Daily As Needed for erectile dysfunction., Disp: 6 tablet, Rfl: 11    Family History   Problem Relation Age of Onset   • No Known Problems Mother    • Heart disease Father        Review of Systems   Respiratory: Negative for shortness of breath.    Cardiovascular: Negative for chest pain.       Objective   Physical Exam   Constitutional: He appears well-developed and well-nourished.   Nursing note and vitals reviewed.      Pertinent laboratory results were reviewed. , Pertinent old records were reviewed.  and Pertinent radiology results were reviewed.     Assessment/Plan   Problems Addressed this Visit        Other    Abnormal LFTs (liver function tests) - Primary    Relevant Orders    Urinalysis With Microscopic - Urine, Clean Catch (Completed)        We will check urinalysis to address possible hematuria.  We discussed fatty liver in detail and the need for the patient to try to reduce his body weight.  I am recommending that he take vitamin 400 units daily.  As an aside I suggested that he try Hotshots to see if it might help his hiccups.

## 2019-04-22 ENCOUNTER — TELEPHONE (OUTPATIENT)
Dept: GASTROENTEROLOGY | Facility: CLINIC | Age: 54
End: 2019-04-22

## 2019-04-22 NOTE — TELEPHONE ENCOUNTER
----- Message from Howie Greer MD sent at 4/19/2019  3:31 PM EDT -----  Please call the patient regarding his abnormal result. Glucosuria only.

## 2019-04-22 NOTE — TELEPHONE ENCOUNTER
Pt notified of urinalysis results as reviewed by Dr Greer:  Elevated glucose in urine.  Pt will f/u with PCP regarding diabetes management.

## 2019-05-06 RX ORDER — AMLODIPINE BESYLATE 5 MG/1
TABLET ORAL
Qty: 30 TABLET | Refills: 5 | Status: SHIPPED | OUTPATIENT
Start: 2019-05-06 | End: 2019-10-30 | Stop reason: SDUPTHER

## 2019-05-08 ENCOUNTER — TELEPHONE (OUTPATIENT)
Dept: FAMILY MEDICINE CLINIC | Facility: CLINIC | Age: 54
End: 2019-05-08

## 2019-05-08 RX ORDER — HYDROCODONE BITARTRATE AND ACETAMINOPHEN 5; 325 MG/1; MG/1
1 TABLET ORAL EVERY 6 HOURS PRN
Qty: 30 TABLET | Refills: 0 | Status: SHIPPED | OUTPATIENT
Start: 2019-05-08 | End: 2019-06-18

## 2019-05-28 RX ORDER — QUETIAPINE FUMARATE 50 MG/1
100 TABLET, FILM COATED ORAL NIGHTLY
Qty: 30 TABLET | Refills: 0 | Status: SHIPPED | OUTPATIENT
Start: 2019-05-28 | End: 2019-06-11 | Stop reason: SDUPTHER

## 2019-06-11 RX ORDER — QUETIAPINE FUMARATE 50 MG/1
100 TABLET, FILM COATED ORAL NIGHTLY
Qty: 30 TABLET | Refills: 0 | Status: SHIPPED | OUTPATIENT
Start: 2019-06-11 | End: 2019-06-24 | Stop reason: SDUPTHER

## 2019-06-18 ENCOUNTER — TELEPHONE (OUTPATIENT)
Dept: FAMILY MEDICINE CLINIC | Facility: CLINIC | Age: 54
End: 2019-06-18

## 2019-06-18 RX ORDER — HYDROCODONE BITARTRATE AND ACETAMINOPHEN 5; 325 MG/1; MG/1
1 TABLET ORAL EVERY 6 HOURS PRN
Qty: 30 TABLET | Refills: 0 | Status: SHIPPED | OUTPATIENT
Start: 2019-06-18 | End: 2019-07-12

## 2019-06-25 RX ORDER — QUETIAPINE FUMARATE 50 MG/1
100 TABLET, FILM COATED ORAL NIGHTLY
Qty: 30 TABLET | Refills: 0 | Status: SHIPPED | OUTPATIENT
Start: 2019-06-25 | End: 2019-11-27 | Stop reason: SDUPTHER

## 2019-06-27 RX ORDER — QUETIAPINE FUMARATE 50 MG/1
TABLET, FILM COATED ORAL
Qty: 30 TABLET | Refills: 3 | Status: SHIPPED | OUTPATIENT
Start: 2019-06-27 | End: 2019-09-04 | Stop reason: SDUPTHER

## 2019-06-27 RX ORDER — DEXLANSOPRAZOLE 60 MG/1
CAPSULE, DELAYED RELEASE ORAL
Qty: 30 CAPSULE | Refills: 3 | Status: SHIPPED | OUTPATIENT
Start: 2019-06-27 | End: 2021-01-29

## 2019-07-11 ENCOUNTER — HOSPITAL ENCOUNTER (OUTPATIENT)
Dept: CT IMAGING | Facility: HOSPITAL | Age: 54
Discharge: HOME | End: 2019-07-11
Admitting: INTERNAL MEDICINE

## 2019-07-11 DIAGNOSIS — R91.1 LUNG NODULE: ICD-10-CM

## 2019-07-11 PROCEDURE — 71250 CT THORAX DX C-: CPT

## 2019-07-12 ENCOUNTER — TELEPHONE (OUTPATIENT)
Dept: FAMILY MEDICINE CLINIC | Facility: CLINIC | Age: 54
End: 2019-07-12

## 2019-07-12 RX ORDER — HYDROCODONE BITARTRATE AND ACETAMINOPHEN 5; 325 MG/1; MG/1
1 TABLET ORAL EVERY 6 HOURS PRN
Qty: 30 TABLET | Refills: 0 | Status: SHIPPED | OUTPATIENT
Start: 2019-07-12 | End: 2019-08-06

## 2019-07-19 ENCOUNTER — TRANSCRIBE ORDERS (OUTPATIENT)
Dept: ADMINISTRATIVE | Facility: HOSPITAL | Age: 54
End: 2019-07-19

## 2019-07-19 DIAGNOSIS — R91.1 LUNG NODULE: Primary | ICD-10-CM

## 2019-08-06 ENCOUNTER — TELEPHONE (OUTPATIENT)
Dept: FAMILY MEDICINE CLINIC | Facility: CLINIC | Age: 54
End: 2019-08-06

## 2019-08-06 RX ORDER — HYDROCODONE BITARTRATE AND ACETAMINOPHEN 5; 325 MG/1; MG/1
1 TABLET ORAL EVERY 6 HOURS PRN
Qty: 30 TABLET | Refills: 0 | Status: SHIPPED | OUTPATIENT
Start: 2019-08-06 | End: 2019-09-09

## 2019-09-03 RX ORDER — METFORMIN HYDROCHLORIDE 500 MG/1
TABLET, EXTENDED RELEASE ORAL
Qty: 90 TABLET | Refills: 0 | Status: SHIPPED | OUTPATIENT
Start: 2019-09-03 | End: 2020-03-24

## 2019-09-04 ENCOUNTER — TELEPHONE (OUTPATIENT)
Dept: FAMILY MEDICINE CLINIC | Facility: CLINIC | Age: 54
End: 2019-09-04

## 2019-09-05 RX ORDER — QUETIAPINE FUMARATE 50 MG/1
TABLET, FILM COATED ORAL
Qty: 30 TABLET | Refills: 5 | Status: SHIPPED | OUTPATIENT
Start: 2019-09-05 | End: 2019-10-15 | Stop reason: DRUGHIGH

## 2019-09-09 ENCOUNTER — TELEPHONE (OUTPATIENT)
Dept: FAMILY MEDICINE CLINIC | Facility: CLINIC | Age: 54
End: 2019-09-09

## 2019-09-09 RX ORDER — HYDROCODONE BITARTRATE AND ACETAMINOPHEN 5; 325 MG/1; MG/1
1 TABLET ORAL EVERY 6 HOURS PRN
Qty: 30 TABLET | Refills: 0 | Status: SHIPPED | OUTPATIENT
Start: 2019-09-09 | End: 2019-10-03

## 2019-09-30 RX ORDER — GLIMEPIRIDE 2 MG/1
TABLET ORAL
Qty: 30 TABLET | Refills: 0 | Status: SHIPPED | OUTPATIENT
Start: 2019-09-30 | End: 2019-10-30 | Stop reason: SDUPTHER

## 2019-10-03 ENCOUNTER — TELEPHONE (OUTPATIENT)
Dept: FAMILY MEDICINE CLINIC | Facility: CLINIC | Age: 54
End: 2019-10-03

## 2019-10-03 RX ORDER — HYDROCODONE BITARTRATE AND ACETAMINOPHEN 5; 325 MG/1; MG/1
1 TABLET ORAL EVERY 6 HOURS PRN
Qty: 30 TABLET | Refills: 0 | Status: SHIPPED | OUTPATIENT
Start: 2019-10-03 | End: 2019-11-05

## 2019-10-15 ENCOUNTER — OFFICE VISIT (OUTPATIENT)
Dept: FAMILY MEDICINE CLINIC | Facility: CLINIC | Age: 54
End: 2019-10-15

## 2019-10-15 VITALS
SYSTOLIC BLOOD PRESSURE: 140 MMHG | WEIGHT: 269 LBS | HEIGHT: 70 IN | TEMPERATURE: 98.7 F | OXYGEN SATURATION: 96 % | HEART RATE: 85 BPM | BODY MASS INDEX: 38.51 KG/M2 | DIASTOLIC BLOOD PRESSURE: 70 MMHG

## 2019-10-15 DIAGNOSIS — J40 BRONCHITIS: Primary | ICD-10-CM

## 2019-10-15 DIAGNOSIS — J01.00 ACUTE MAXILLARY SINUSITIS, RECURRENCE NOT SPECIFIED: ICD-10-CM

## 2019-10-15 DIAGNOSIS — E78.5 HYPERLIPIDEMIA, UNSPECIFIED HYPERLIPIDEMIA TYPE: ICD-10-CM

## 2019-10-15 DIAGNOSIS — E11.9 DIABETES MELLITUS WITHOUT COMPLICATION (HCC): ICD-10-CM

## 2019-10-15 DIAGNOSIS — R53.83 FATIGUE, UNSPECIFIED TYPE: ICD-10-CM

## 2019-10-15 DIAGNOSIS — I10 HYPERTENSION, ESSENTIAL: ICD-10-CM

## 2019-10-15 LAB
ALBUMIN SERPL-MCNC: 4.7 G/DL (ref 3.5–5.2)
ALBUMIN UR-MCNC: 20 MG/L (ref 0–20)
ALBUMIN/GLOB SERPL: 1.5 G/DL
ALP SERPL-CCNC: 62 U/L (ref 39–117)
ALT SERPL W P-5'-P-CCNC: 28 U/L (ref 1–41)
ANION GAP SERPL CALCULATED.3IONS-SCNC: 5.9 MMOL/L (ref 5–15)
AST SERPL-CCNC: 28 U/L (ref 1–40)
BILIRUB SERPL-MCNC: 0.3 MG/DL (ref 0.2–1.2)
BUN BLD-MCNC: 12 MG/DL (ref 6–20)
BUN/CREAT SERPL: 19 (ref 7–25)
CALCIUM SPEC-SCNC: 9.3 MG/DL (ref 8.6–10.5)
CHLORIDE SERPL-SCNC: 92 MMOL/L (ref 98–107)
CHOLEST SERPL-MCNC: 116 MG/DL (ref 0–200)
CO2 SERPL-SCNC: 36.1 MMOL/L (ref 22–29)
CREAT BLD-MCNC: 0.63 MG/DL (ref 0.76–1.27)
ERYTHROCYTE [DISTWIDTH] IN BLOOD BY AUTOMATED COUNT: 14.9 % (ref 12.3–15.4)
GFR SERPL CREATININE-BSD FRML MDRD: >150 ML/MIN/1.73
GLOBULIN UR ELPH-MCNC: 3.2 GM/DL
GLUCOSE BLD-MCNC: 132 MG/DL (ref 65–99)
HBA1C MFR BLD: 7.6 % (ref 4.8–5.6)
HCT VFR BLD AUTO: 48.1 % (ref 37.5–51)
HDLC SERPL-MCNC: 41 MG/DL (ref 40–60)
HGB BLD-MCNC: 15.4 G/DL (ref 13–17.7)
LDLC SERPL CALC-MCNC: 57 MG/DL (ref 0–100)
LDLC/HDLC SERPL: 1.4 {RATIO}
LYMPHOCYTES # BLD AUTO: 2.1 10*3/MM3 (ref 0.7–3.1)
LYMPHOCYTES NFR BLD AUTO: 25.4 % (ref 19.6–45.3)
MCH RBC QN AUTO: 28.3 PG (ref 26.6–33)
MCHC RBC AUTO-ENTMCNC: 32 G/DL (ref 31.5–35.7)
MCV RBC AUTO: 88.3 FL (ref 79–97)
MONOCYTES # BLD AUTO: 0.5 10*3/MM3 (ref 0.1–0.9)
MONOCYTES NFR BLD AUTO: 5.5 % (ref 5–12)
NEUTROPHILS # BLD AUTO: 5.7 10*3/MM3 (ref 1.7–7)
NEUTROPHILS NFR BLD AUTO: 69.1 % (ref 42.7–76)
PLATELET # BLD AUTO: 236 10*3/MM3 (ref 140–450)
PMV BLD AUTO: 7.4 FL (ref 6–12)
POTASSIUM BLD-SCNC: 4.4 MMOL/L (ref 3.5–5.2)
PROT SERPL-MCNC: 7.9 G/DL (ref 6–8.5)
RBC # BLD AUTO: 5.45 10*6/MM3 (ref 4.14–5.8)
SODIUM BLD-SCNC: 134 MMOL/L (ref 136–145)
TRIGL SERPL-MCNC: 88 MG/DL (ref 0–150)
TSH SERPL DL<=0.05 MIU/L-ACNC: 1.04 UIU/ML (ref 0.27–4.2)
VLDLC SERPL-MCNC: 17.6 MG/DL (ref 5–40)
WBC NRBC COR # BLD: 8.3 10*3/MM3 (ref 3.4–10.8)

## 2019-10-15 PROCEDURE — 80061 LIPID PANEL: CPT | Performed by: INTERNAL MEDICINE

## 2019-10-15 PROCEDURE — 82043 UR ALBUMIN QUANTITATIVE: CPT | Performed by: INTERNAL MEDICINE

## 2019-10-15 PROCEDURE — 83036 HEMOGLOBIN GLYCOSYLATED A1C: CPT | Performed by: INTERNAL MEDICINE

## 2019-10-15 PROCEDURE — 80053 COMPREHEN METABOLIC PANEL: CPT | Performed by: INTERNAL MEDICINE

## 2019-10-15 PROCEDURE — 90471 IMMUNIZATION ADMIN: CPT | Performed by: INTERNAL MEDICINE

## 2019-10-15 PROCEDURE — 84443 ASSAY THYROID STIM HORMONE: CPT | Performed by: INTERNAL MEDICINE

## 2019-10-15 PROCEDURE — 99214 OFFICE O/P EST MOD 30 MIN: CPT | Performed by: INTERNAL MEDICINE

## 2019-10-15 PROCEDURE — 85025 COMPLETE CBC W/AUTO DIFF WBC: CPT | Performed by: INTERNAL MEDICINE

## 2019-10-15 PROCEDURE — 36415 COLL VENOUS BLD VENIPUNCTURE: CPT | Performed by: INTERNAL MEDICINE

## 2019-10-15 PROCEDURE — 90674 CCIIV4 VAC NO PRSV 0.5 ML IM: CPT | Performed by: INTERNAL MEDICINE

## 2019-10-15 RX ORDER — LEVOFLOXACIN 500 MG/1
500 TABLET, FILM COATED ORAL DAILY
Qty: 10 TABLET | Refills: 0 | Status: SHIPPED | OUTPATIENT
Start: 2019-10-15 | End: 2020-07-09

## 2019-10-15 RX ORDER — LISINOPRIL 10 MG/1
10 TABLET ORAL DAILY
Qty: 30 TABLET | Refills: 11 | Status: SHIPPED | OUTPATIENT
Start: 2019-10-15 | End: 2020-11-06

## 2019-10-15 RX ORDER — CLOTRIMAZOLE AND BETAMETHASONE DIPROPIONATE 10; .64 MG/G; MG/G
CREAM TOPICAL 2 TIMES DAILY
Qty: 45 G | Refills: 0 | Status: SHIPPED | OUTPATIENT
Start: 2019-10-15 | End: 2019-11-02 | Stop reason: SDUPTHER

## 2019-10-15 NOTE — PROGRESS NOTES
Subjective   Go Cho is a 54 y.o. male.     History of Present Illness       Review of Systems    Objective   Vitals:    10/15/19 1407   BP: 140/70   Pulse: 85   Temp: 98.7 °F (37.1 °C)   SpO2: 96%   Weight: 122 kg (269 lb)     Physical Exam    Lab Results   Component Value Date    INR 1.17 (H) 10/02/2016       Procedures    Assessment/Plan

## 2019-10-15 NOTE — PROGRESS NOTES
Subjective   Go Cho is a 54 y.o. male.   Chest congesstion for 3-4 wks with some sputum gradually getting somewhat better but not well sinuses i also involved began as allergies now some cloudy sputum  Cough   Associated symptoms include postnasal drip.      Increased temperature was above.  His persistent cough is not hearing himself wheeze at night.  Needs follow-up on diabetes lipids and blood pressure as well.  Also has ongoing element of fatigue will get a CBC and TSH to check this as well as her existing lab he needs for his diabetes.  No other specific complaints.  We did talk about patient try to exercise more to keep his weight in check.  Of note patient does request when he does need his refill on pain medicine severe quantity of 60 for convenience sake to minimize visits to the pharmacy.  Drug allergies are none.  Family history of heart disease.  Patient's current smoker quarter pack per day  Review of Systems   HENT: Positive for congestion and postnasal drip.    Respiratory: Positive for cough.    All other systems reviewed and are negative.      Objective   Vitals:    10/15/19 1407   BP: 140/70   Pulse: 85   Temp: 98.7 °F (37.1 °C)   SpO2: 96%   Weight: 122 kg (269 lb)     Physical Exam   Constitutional: He appears well-developed and well-nourished.   HENT:   Head: Normocephalic and atraumatic.   Right Ear: External ear normal.   Left Ear: External ear normal.   Mouth/Throat: Oropharynx is clear and moist.   Minimal pressure over maxillary sinuses   Eyes: Conjunctivae are normal. Pupils are equal, round, and reactive to light.   Cardiovascular: Normal rate, regular rhythm and normal heart sounds.   Pulmonary/Chest: Effort normal and breath sounds normal.   Abdominal: Soft. Bowel sounds are normal.   Neurological: He is alert.   Unremarkable gait and station   Skin: Skin is warm and dry.   Some discoloration with some flaking skin plantar surface left foot as well as medial ankle  suggestive of fungal etiology we will treat with Lotrisone   Nursing note and vitals reviewed.      Lab Results   Component Value Date    INR 1.17 (H) 10/02/2016       Procedures    Assessment/Plan     1.  Bronchitis plan Levaquin 5 mg daily for 10 days time follow-up or call if not well    2.  Maxillary sinusitis plan see #1    3.  Hyperlipidemia weight pending lab    4.  Type 2 diabetes also weight pending lab if good recheck in 6 months    5.  Hypertension slightly increased we will increase his lisinopril from 5 mg up to 10 mg a day: Blood pressure readings in 2 weeks time    6.  Fatigue unspecified await pending lab    Much of this encounter note is an electronic transcription/translation of spoken language to printed text.  The electronic translation of spoken language may permit erroneous, or at times, nonsensical words or phrases to be inadvertently transcribed.  Although I have reviewed the note for such errors, some may still exist. If there are questions or for further clarification, please contact me.

## 2019-10-17 RX ORDER — PEN NEEDLE, DIABETIC 30 GX3/16"
0.25 NEEDLE, DISPOSABLE MISCELLANEOUS WEEKLY
Qty: 12 EACH | Refills: 3 | Status: SHIPPED | OUTPATIENT
Start: 2019-10-17

## 2019-10-28 ENCOUNTER — TELEPHONE (OUTPATIENT)
Dept: FAMILY MEDICINE CLINIC | Facility: CLINIC | Age: 54
End: 2019-10-28

## 2019-10-30 ENCOUNTER — TELEPHONE (OUTPATIENT)
Dept: FAMILY MEDICINE CLINIC | Facility: CLINIC | Age: 54
End: 2019-10-30

## 2019-10-30 RX ORDER — AMLODIPINE BESYLATE 5 MG/1
TABLET ORAL
Qty: 30 TABLET | Refills: 5 | Status: SHIPPED | OUTPATIENT
Start: 2019-10-30 | End: 2020-04-28

## 2019-10-30 RX ORDER — OMEPRAZOLE 40 MG/1
40 CAPSULE, DELAYED RELEASE ORAL DAILY
Qty: 30 CAPSULE | Refills: 5 | Status: SHIPPED | OUTPATIENT
Start: 2019-10-30 | End: 2020-06-08

## 2019-10-30 RX ORDER — ATORVASTATIN CALCIUM 10 MG/1
TABLET, FILM COATED ORAL
Qty: 30 TABLET | Refills: 5 | Status: SHIPPED | OUTPATIENT
Start: 2019-10-30 | End: 2020-05-19

## 2019-10-30 RX ORDER — GLIMEPIRIDE 2 MG/1
TABLET ORAL
Qty: 30 TABLET | Refills: 5 | Status: SHIPPED | OUTPATIENT
Start: 2019-10-30 | End: 2020-04-28

## 2019-10-30 RX ORDER — LISINOPRIL 5 MG/1
TABLET ORAL
Qty: 30 TABLET | Refills: 5 | Status: SHIPPED | OUTPATIENT
Start: 2019-10-30 | End: 2020-07-09 | Stop reason: DRUGHIGH

## 2019-10-30 NOTE — TELEPHONE ENCOUNTER
I am under the impression we gave him Ozempic and it was deemed to be too expensive.  Please clarify this and make sure he has a coupon

## 2019-10-30 NOTE — TELEPHONE ENCOUNTER
Prior note said that Ozempic is too expensive and if it is 1 of their prefers with edema less expensive so please see if he is using a coupon for better rate.

## 2019-11-04 RX ORDER — CLOTRIMAZOLE AND BETAMETHASONE DIPROPIONATE 10; .64 MG/G; MG/G
CREAM TOPICAL
Qty: 45 G | Refills: 0 | Status: SHIPPED | OUTPATIENT
Start: 2019-11-04 | End: 2020-07-09

## 2019-11-05 ENCOUNTER — TELEPHONE (OUTPATIENT)
Dept: FAMILY MEDICINE CLINIC | Facility: CLINIC | Age: 54
End: 2019-11-05

## 2019-11-05 RX ORDER — HYDROCODONE BITARTRATE AND ACETAMINOPHEN 5; 325 MG/1; MG/1
1 TABLET ORAL EVERY 6 HOURS PRN
Qty: 60 TABLET | Refills: 0 | Status: SHIPPED | OUTPATIENT
Start: 2019-11-05 | End: 2019-12-30

## 2019-11-25 RX ORDER — QUETIAPINE FUMARATE 50 MG/1
TABLET, FILM COATED ORAL
Qty: 90 TABLET | Refills: 0 | Status: SHIPPED | OUTPATIENT
Start: 2019-11-25 | End: 2019-11-27 | Stop reason: SDUPTHER

## 2019-11-27 RX ORDER — QUETIAPINE FUMARATE 50 MG/1
100 TABLET, FILM COATED ORAL NIGHTLY
Qty: 180 TABLET | Refills: 3 | Status: SHIPPED | OUTPATIENT
Start: 2019-11-27 | End: 2020-11-17

## 2019-12-16 ENCOUNTER — TELEPHONE (OUTPATIENT)
Dept: FAMILY MEDICINE CLINIC | Facility: CLINIC | Age: 54
End: 2019-12-16

## 2019-12-16 RX ORDER — AZITHROMYCIN 250 MG/1
TABLET, FILM COATED ORAL
Qty: 6 TABLET | Refills: 1 | Status: SHIPPED | OUTPATIENT
Start: 2019-12-16 | End: 2020-07-09

## 2019-12-30 ENCOUNTER — TELEPHONE (OUTPATIENT)
Dept: FAMILY MEDICINE CLINIC | Facility: CLINIC | Age: 54
End: 2019-12-30

## 2019-12-30 RX ORDER — HYDROCODONE BITARTRATE AND ACETAMINOPHEN 5; 325 MG/1; MG/1
1 TABLET ORAL EVERY 6 HOURS PRN
Qty: 60 TABLET | Refills: 0 | Status: SHIPPED | OUTPATIENT
Start: 2019-12-30 | End: 2020-02-24

## 2020-01-15 ENCOUNTER — APPOINTMENT (OUTPATIENT)
Dept: CT IMAGING | Facility: HOSPITAL | Age: 55
End: 2020-01-15

## 2020-01-24 ENCOUNTER — HOSPITAL ENCOUNTER (OUTPATIENT)
Dept: CT IMAGING | Facility: HOSPITAL | Age: 55
Discharge: HOME | End: 2020-01-24
Admitting: INTERNAL MEDICINE

## 2020-01-24 DIAGNOSIS — R91.1 LUNG NODULE: ICD-10-CM

## 2020-01-24 PROCEDURE — 71250 CT THORAX DX C-: CPT

## 2020-01-29 ENCOUNTER — TRANSCRIBE ORDERS (OUTPATIENT)
Dept: ADMINISTRATIVE | Facility: HOSPITAL | Age: 55
End: 2020-01-29

## 2020-01-29 DIAGNOSIS — R91.1 LUNG NODULE: Primary | ICD-10-CM

## 2020-02-07 RX ORDER — CANAGLIFLOZIN 100 MG/1
TABLET, FILM COATED ORAL
Qty: 30 TABLET | Refills: 5 | Status: SHIPPED | OUTPATIENT
Start: 2020-02-07 | End: 2020-11-03 | Stop reason: CLARIF

## 2020-02-24 ENCOUNTER — TELEPHONE (OUTPATIENT)
Dept: FAMILY MEDICINE CLINIC | Facility: CLINIC | Age: 55
End: 2020-02-24

## 2020-02-24 RX ORDER — HYDROCODONE BITARTRATE AND ACETAMINOPHEN 5; 325 MG/1; MG/1
1 TABLET ORAL EVERY 6 HOURS PRN
Qty: 60 TABLET | Refills: 0 | Status: SHIPPED | OUTPATIENT
Start: 2020-02-24 | End: 2020-04-20 | Stop reason: SDUPTHER

## 2020-03-24 RX ORDER — METFORMIN HYDROCHLORIDE 500 MG/1
TABLET, EXTENDED RELEASE ORAL
Qty: 270 TABLET | Refills: 1 | Status: SHIPPED | OUTPATIENT
Start: 2020-03-24 | End: 2020-09-28 | Stop reason: SDUPTHER

## 2020-03-24 RX ORDER — METFORMIN HYDROCHLORIDE 500 MG/1
TABLET, EXTENDED RELEASE ORAL
Qty: 90 TABLET | Refills: 0 | Status: SHIPPED | OUTPATIENT
Start: 2020-03-24 | End: 2020-03-24

## 2020-04-13 RX ORDER — DULAGLUTIDE 0.75 MG/.5ML
INJECTION, SOLUTION SUBCUTANEOUS
Qty: 2 ML | Refills: 1 | Status: SHIPPED | OUTPATIENT
Start: 2020-04-13 | End: 2020-06-22

## 2020-04-20 ENCOUNTER — TELEPHONE (OUTPATIENT)
Dept: FAMILY MEDICINE CLINIC | Facility: CLINIC | Age: 55
End: 2020-04-20

## 2020-04-20 DIAGNOSIS — Z51.81 MEDICATION MONITORING ENCOUNTER: Primary | ICD-10-CM

## 2020-04-20 RX ORDER — HYDROCODONE BITARTRATE AND ACETAMINOPHEN 5; 325 MG/1; MG/1
1 TABLET ORAL EVERY 6 HOURS PRN
Qty: 60 TABLET | Refills: 0 | Status: SHIPPED | OUTPATIENT
Start: 2020-04-20 | End: 2020-06-01 | Stop reason: SDUPTHER

## 2020-04-20 NOTE — TELEPHONE ENCOUNTER
Inform patient needs to come in for UDS Darius and drug contract did send through a refill today if patient could do that next week or so that would be good

## 2020-04-28 RX ORDER — GLIMEPIRIDE 2 MG/1
TABLET ORAL
Qty: 30 TABLET | Refills: 5 | Status: SHIPPED | OUTPATIENT
Start: 2020-04-28 | End: 2021-01-29 | Stop reason: SDUPTHER

## 2020-04-28 RX ORDER — AMLODIPINE BESYLATE 5 MG/1
TABLET ORAL
Qty: 30 TABLET | Refills: 5 | Status: SHIPPED | OUTPATIENT
Start: 2020-04-28 | End: 2020-10-19

## 2020-05-05 ENCOUNTER — LAB (OUTPATIENT)
Dept: FAMILY MEDICINE CLINIC | Facility: CLINIC | Age: 55
End: 2020-05-05

## 2020-05-05 DIAGNOSIS — Z51.81 MEDICATION MONITORING ENCOUNTER: ICD-10-CM

## 2020-05-07 RX ORDER — GLIMEPIRIDE 2 MG/1
TABLET ORAL
Qty: 30 TABLET | Refills: 5 | OUTPATIENT
Start: 2020-05-07

## 2020-05-07 NOTE — TELEPHONE ENCOUNTER
Patient should have this still see if he is worth prescription was sent.  Also see if it did go through our E prescribing

## 2020-05-07 NOTE — TELEPHONE ENCOUNTER
"Rehab Progress Note     Encounter Date: 2/13/2020    CC: Headache, head lacerations    Interval Events (Subjective)  Patient sitting up in room. He reports he is doing well. He reports therapy is going well. He continues to use frequent high dose Oxycodone. Discussed about potential for constipation. He reports he will continue to take the stool softeners. Otherwise he would only like the Tylenol while awake, discussed will increase dose and have three times daily. Denies NVD. Denies SOB.     IDT Team Meeting 2/11/2020  DC/Disposition:  2/17/20    Objective:  VITAL SIGNS: /88   Pulse 86   Temp 36.8 °C (98.3 °F) (Oral)   Resp 18   Ht 1.803 m (5' 11\")   Wt 99.9 kg (220 lb 3.8 oz)   SpO2 93%   BMI 30.72 kg/m²   Gen: NAD  Psych: Mood and affect appropriate  CV: RRR, no edema  Resp: CTAB, no upper airway sounds  Abd: NTND  Neuro: AOx4, following simple commands    No results found for this or any previous visit (from the past 72 hour(s)).    Current Facility-Administered Medications   Medication Frequency   • gabapentin (NEURONTIN) capsule 400 mg TID   • acetaminophen (TYLENOL) tablet 650 mg Q6HRS   • ascorbic acid tablet 500 mg QDAY with Breakfast   • ferrous sulfate tablet 325 mg QDAY with Breakfast   • Respiratory Care per Protocol Continuous RT   • oxyCODONE immediate-release (ROXICODONE) tablet 5 mg Q3HRS PRN   • oxyCODONE immediate release (ROXICODONE) tablet 10 mg Q3HRS PRN   • hydrALAZINE (APRESOLINE) tablet 25 mg Q8HRS PRN   • acetaminophen (TYLENOL) tablet 650 mg Q4HRS PRN   • senna-docusate (PERICOLACE or SENOKOT S) 8.6-50 MG per tablet 2 Tab BID    And   • polyethylene glycol/lytes (MIRALAX) PACKET 1 Packet QDAY PRN    And   • magnesium hydroxide (MILK OF MAGNESIA) suspension 30 mL QDAY PRN    And   • bisacodyl (DULCOLAX) suppository 10 mg QDAY PRN   • benzocaine-menthol (CEPACOL) lozenge 1 Lozenge Q2HRS PRN   • mag hydrox-al hydrox-simeth (MAALOX PLUS ES or MYLANTA DS) suspension 20 mL Q2HRS PRN " lov10/2019     • ondansetron (ZOFRAN ODT) dispertab 4 mg 4X/DAY PRN    Or   • ondansetron (ZOFRAN) syringe/vial injection 4 mg 4X/DAY PRN   • traZODone (DESYREL) tablet 50 mg QHS PRN   • sodium chloride (OCEAN) 0.65 % nasal spray 2 Spray PRN   • DULoxetine (CYMBALTA) capsule 60 mg DAILY   • QUEtiapine (SEROQUEL) tablet 200 mg QHS   • traZODone (DESYREL) tablet 50 mg Nightly   • polyethyl glycol-propyl glycol (SYSTANE) 0.4-0.3 % ophth drops 1 Drop PRN       Orders Placed This Encounter   Procedures   • Diet Order Regular (after passing bedside swallow eval )     Standing Status:   Standing     Number of Occurrences:   1     Order Specific Question:   Diet:     Answer:   Regular [1]     Comments:   after passing bedside swallow eval      Order Specific Question:   Texture/Fiber modifications:     Answer:   Dysphagia 3(Mechanical Soft)specify fluid consistency(question 6) [3]       Assessment:  Active Hospital Problems    Diagnosis   • *Cervical spine fracture (HCC)   • Syncope   • Pain and swelling of right lower extremity   • Mood disorder (HCC)   • Gout   • Hypertension   • Forehead laceration   • Normocytic anemia   • Tobacco abuse   • Pancytopenia (HCC)   • H/O cervical fracture       Medical Decision Making and Plan:  C7 Fracture - Patient with syncopal event with C7 fracture managed non-operatively. In [place] collar.   -PT and OT for mobility and ADLs     Right jaw pain - Most likely from fall. No fracture on imaging, continues to improve.     HTN - Patient on Lisinopril 20 mg. Previously on Amlodipine 5 mg  -Consult hospitalist. Low SBP on admission. Hold amlodipine and decreased Lisinopril to 5 mg     Neuropathic pain - From previous C5-6 fractures with nerve injury. Continue Gabapentin 300 mg TID  -Increase Gabapentin 400 mg TID.  Change Tylenol to 1000 mg TID    R Knee pain - s/p aspiration of blood products. With improved ROM and pain control  -PRN Oxycodone.      Leukopenia - Down to 1.9, improved on 2/10/20 after  granix. Discontinue precautions    Depression - Patient on Duloxetine 60 mg daily, Seroquel 200 mg QHS, and Trazodone 50 mg QHS     Constipation -  Start Senna-docusate.      DVT Ppx - Patient on Lovenox on transfer. Ambulating > 500 feet    Total time:  25 minutes.  I spent greater than 50% of the time for patient care, counseling, and coordination on this date, including unit/floor time, and face-to-face time with the patient as per interval events and assessment and plan above. Topics discussed included pain control, opiate usage, and change Tylenol to TID and increase to 1000.     Talia Shields M.D.

## 2020-05-08 LAB — CONV REPORT SUMMARY: NORMAL

## 2020-05-19 RX ORDER — ATORVASTATIN CALCIUM 10 MG/1
10 TABLET, FILM COATED ORAL DAILY
Qty: 90 TABLET | Refills: 1 | Status: SHIPPED | OUTPATIENT
Start: 2020-05-19 | End: 2020-11-17

## 2020-05-20 RX ORDER — GLIMEPIRIDE 2 MG/1
2 TABLET ORAL
Qty: 30 TABLET | Refills: 0 | Status: SHIPPED | OUTPATIENT
Start: 2020-05-20 | End: 2021-01-05

## 2020-06-01 ENCOUNTER — TELEPHONE (OUTPATIENT)
Dept: FAMILY MEDICINE CLINIC | Facility: CLINIC | Age: 55
End: 2020-06-01

## 2020-06-01 RX ORDER — HYDROCODONE BITARTRATE AND ACETAMINOPHEN 5; 325 MG/1; MG/1
1 TABLET ORAL EVERY 6 HOURS PRN
Qty: 60 TABLET | Refills: 0 | Status: SHIPPED | OUTPATIENT
Start: 2020-06-01 | End: 2020-08-11

## 2020-06-08 RX ORDER — OMEPRAZOLE 40 MG/1
40 CAPSULE, DELAYED RELEASE ORAL DAILY
Qty: 30 CAPSULE | Refills: 5 | Status: SHIPPED | OUTPATIENT
Start: 2020-06-08 | End: 2020-09-18

## 2020-06-22 RX ORDER — DULAGLUTIDE 0.75 MG/.5ML
INJECTION, SOLUTION SUBCUTANEOUS
Qty: 2 ML | Refills: 5 | Status: SHIPPED | OUTPATIENT
Start: 2020-06-22 | End: 2020-11-16

## 2020-07-09 ENCOUNTER — OFFICE VISIT (OUTPATIENT)
Dept: FAMILY MEDICINE CLINIC | Facility: CLINIC | Age: 55
End: 2020-07-09

## 2020-07-09 VITALS
BODY MASS INDEX: 38.77 KG/M2 | SYSTOLIC BLOOD PRESSURE: 140 MMHG | OXYGEN SATURATION: 98 % | WEIGHT: 270.8 LBS | HEIGHT: 70 IN | TEMPERATURE: 98.4 F | HEART RATE: 98 BPM | DIASTOLIC BLOOD PRESSURE: 80 MMHG

## 2020-07-09 DIAGNOSIS — J01.00 ACUTE NON-RECURRENT MAXILLARY SINUSITIS: Primary | ICD-10-CM

## 2020-07-09 DIAGNOSIS — I10 HYPERTENSION, ESSENTIAL: ICD-10-CM

## 2020-07-09 DIAGNOSIS — E78.5 HYPERLIPIDEMIA, UNSPECIFIED HYPERLIPIDEMIA TYPE: ICD-10-CM

## 2020-07-09 DIAGNOSIS — E11.9 DIABETES MELLITUS WITHOUT COMPLICATION (HCC): ICD-10-CM

## 2020-07-09 LAB
ALBUMIN SERPL-MCNC: 4.7 G/DL (ref 3.5–5.2)
ALBUMIN UR-MCNC: 0 MG/L (ref 0–20)
ALBUMIN/GLOB SERPL: 1.5 G/DL
ALP SERPL-CCNC: 69 U/L (ref 39–117)
ALT SERPL W P-5'-P-CCNC: 38 U/L (ref 1–41)
ANION GAP SERPL CALCULATED.3IONS-SCNC: 10.1 MMOL/L (ref 5–15)
AST SERPL-CCNC: 34 U/L (ref 1–40)
BILIRUB SERPL-MCNC: 0.4 MG/DL (ref 0–1.2)
BUN SERPL-MCNC: 11 MG/DL (ref 6–20)
BUN/CREAT SERPL: 15.7 (ref 7–25)
CALCIUM SPEC-SCNC: 9.8 MG/DL (ref 8.6–10.5)
CHLORIDE SERPL-SCNC: 99 MMOL/L (ref 98–107)
CHOLEST SERPL-MCNC: 98 MG/DL (ref 0–200)
CO2 SERPL-SCNC: 27.9 MMOL/L (ref 22–29)
CREAT SERPL-MCNC: 0.7 MG/DL (ref 0.76–1.27)
GFR SERPL CREATININE-BSD FRML MDRD: 141 ML/MIN/1.73
GLOBULIN UR ELPH-MCNC: 3.2 GM/DL
GLUCOSE SERPL-MCNC: 96 MG/DL (ref 65–99)
HBA1C MFR BLD: 7.3 % (ref 4.8–5.6)
HDLC SERPL-MCNC: 35 MG/DL (ref 40–60)
LDLC SERPL CALC-MCNC: 41 MG/DL (ref 0–100)
LDLC/HDLC SERPL: 1.18 {RATIO}
POTASSIUM SERPL-SCNC: 4.5 MMOL/L (ref 3.5–5.2)
PROT SERPL-MCNC: 7.9 G/DL (ref 6–8.5)
SODIUM SERPL-SCNC: 137 MMOL/L (ref 136–145)
TRIGL SERPL-MCNC: 108 MG/DL (ref 0–150)
VLDLC SERPL-MCNC: 21.6 MG/DL (ref 5–40)

## 2020-07-09 PROCEDURE — 80061 LIPID PANEL: CPT | Performed by: INTERNAL MEDICINE

## 2020-07-09 PROCEDURE — 82043 UR ALBUMIN QUANTITATIVE: CPT | Performed by: INTERNAL MEDICINE

## 2020-07-09 PROCEDURE — 83036 HEMOGLOBIN GLYCOSYLATED A1C: CPT | Performed by: INTERNAL MEDICINE

## 2020-07-09 PROCEDURE — 99214 OFFICE O/P EST MOD 30 MIN: CPT | Performed by: INTERNAL MEDICINE

## 2020-07-09 PROCEDURE — 36415 COLL VENOUS BLD VENIPUNCTURE: CPT | Performed by: INTERNAL MEDICINE

## 2020-07-09 PROCEDURE — 80053 COMPREHEN METABOLIC PANEL: CPT | Performed by: INTERNAL MEDICINE

## 2020-07-09 RX ORDER — AMOXICILLIN 875 MG/1
875 TABLET, COATED ORAL 2 TIMES DAILY
Qty: 20 TABLET | Refills: 0 | Status: SHIPPED | OUTPATIENT
Start: 2020-07-09 | End: 2020-07-19

## 2020-07-09 RX ORDER — AZELASTINE 1 MG/ML
2 SPRAY, METERED NASAL 2 TIMES DAILY
Qty: 30 ML | Refills: 11 | Status: SHIPPED | OUTPATIENT
Start: 2020-07-09 | End: 2021-01-29 | Stop reason: SDUPTHER

## 2020-07-09 NOTE — PROGRESS NOTES
Subjective Patient with sinus pressure drainage notices pouring cannot stop does have allergies as well.  Things like this is an infection but no reported temperature due for follow-up on blood pressure lipids and diabetes.  Go Cho is a 55 y.o. male.   Body mass index is 38.86 kg/m².  History of Present Illness   Sinus drainage and pressure.  No increased temperature no COVID-19 exposures patient is aware of blood pressure satisfactory today we will follow-up on that as well as lipids which will get lab for as well as type 2 diabetes he checks his glucose infrequently but has ran 100 last time he checked.  Drug allergies are none.  Patient's current smoker family history positive heart disease.  Review of Systems   HENT: Positive for sinus pressure and sinus pain.    All other systems reviewed and are negative.      Objective   Vitals:    07/09/20 1527   BP: 140/80   Pulse: 98   Temp: 98.4 °F (36.9 °C)   SpO2: 98%   Weight: 123 kg (270 lb 12.8 oz)     Physical Exam   Constitutional: He appears well-developed and well-nourished.   HENT:   Head: Normocephalic and atraumatic.   Right Ear: External ear normal.   Left Ear: External ear normal.   Mouth/Throat: Oropharynx is clear and moist.   Eyes: Pupils are equal, round, and reactive to light. Conjunctivae are normal.   Cardiovascular: Normal rate, regular rhythm and normal heart sounds.   Pulmonary/Chest: Effort normal and breath sounds normal.   Abdominal: Soft. Bowel sounds are normal.   Neurological: He is alert.   Unremarkable gait and station   Skin: Skin is warm and dry.   Nursing note and vitals reviewed.      Lab Results   Component Value Date    INR 1.17 (H) 10/02/2016       Procedures    Assessment/Plan   1.  Acute maxillary sinusitis plan amoxicillin 875 twice daily for 10 days time the patient try Astelin nasal spray as well follow-up if not well in 10 days time or call    2.  Hypertension controlled by history continue present medicine with  recheck 6 months    3.  Type 2 diabetes weight pending lab if good continue present with recheck 6 months samples of own, 100 as well as coupon for same given    4.  Hyperlipidemia get updated values if satisfactory continue present meds with recheck in 6 months    Much of this encounter note is an electronic transcription/translation of spoken language to printed text.  The electronic translation of spoken language may permit erroneous, or at times, nonsensical words or phrases to be inadvertently transcribed.  Although I have reviewed the note for such errors, some may still exist. If there are questions or for further clarification, please contact me.

## 2020-08-10 ENCOUNTER — TELEPHONE (OUTPATIENT)
Dept: FAMILY MEDICINE CLINIC | Facility: CLINIC | Age: 55
End: 2020-08-10

## 2020-08-11 RX ORDER — HYDROCODONE BITARTRATE AND ACETAMINOPHEN 5; 325 MG/1; MG/1
1 TABLET ORAL EVERY 6 HOURS PRN
Qty: 60 TABLET | Refills: 0 | Status: SHIPPED | OUTPATIENT
Start: 2020-08-11 | End: 2020-10-08 | Stop reason: SDUPTHER

## 2020-09-18 RX ORDER — OMEPRAZOLE 40 MG/1
40 CAPSULE, DELAYED RELEASE ORAL DAILY
Qty: 30 CAPSULE | Refills: 5 | Status: SHIPPED | OUTPATIENT
Start: 2020-09-18 | End: 2021-01-13 | Stop reason: SDUPTHER

## 2020-09-28 RX ORDER — METFORMIN HYDROCHLORIDE 500 MG/1
500 TABLET, EXTENDED RELEASE ORAL 3 TIMES DAILY
Qty: 270 TABLET | Refills: 1 | Status: SHIPPED | OUTPATIENT
Start: 2020-09-28 | End: 2021-04-26 | Stop reason: SDUPTHER

## 2020-10-08 ENCOUNTER — OFFICE VISIT (OUTPATIENT)
Dept: FAMILY MEDICINE CLINIC | Facility: CLINIC | Age: 55
End: 2020-10-08

## 2020-10-08 VITALS
DIASTOLIC BLOOD PRESSURE: 78 MMHG | HEART RATE: 92 BPM | BODY MASS INDEX: 38.77 KG/M2 | HEIGHT: 70 IN | OXYGEN SATURATION: 96 % | WEIGHT: 270.8 LBS | SYSTOLIC BLOOD PRESSURE: 138 MMHG | TEMPERATURE: 97.8 F

## 2020-10-08 DIAGNOSIS — G89.29 CHRONIC PAIN OF BOTH KNEES: ICD-10-CM

## 2020-10-08 DIAGNOSIS — E78.5 HYPERLIPIDEMIA, UNSPECIFIED HYPERLIPIDEMIA TYPE: ICD-10-CM

## 2020-10-08 DIAGNOSIS — E11.9 DIABETES MELLITUS WITHOUT COMPLICATION (HCC): ICD-10-CM

## 2020-10-08 DIAGNOSIS — M25.562 CHRONIC PAIN OF BOTH KNEES: ICD-10-CM

## 2020-10-08 DIAGNOSIS — I10 HYPERTENSION, ESSENTIAL: ICD-10-CM

## 2020-10-08 DIAGNOSIS — M54.50 ACUTE LEFT-SIDED LOW BACK PAIN WITHOUT SCIATICA: Primary | ICD-10-CM

## 2020-10-08 DIAGNOSIS — M25.561 CHRONIC PAIN OF BOTH KNEES: ICD-10-CM

## 2020-10-08 LAB
ALBUMIN SERPL-MCNC: 4.5 G/DL (ref 3.5–5.2)
ALBUMIN/GLOB SERPL: 1.4 G/DL
ALP SERPL-CCNC: 78 U/L (ref 39–117)
ALT SERPL W P-5'-P-CCNC: 35 U/L (ref 1–41)
ANION GAP SERPL CALCULATED.3IONS-SCNC: 5.6 MMOL/L (ref 5–15)
AST SERPL-CCNC: 29 U/L (ref 1–40)
BACTERIA UR QL AUTO: NORMAL /HPF
BILIRUB SERPL-MCNC: 0.3 MG/DL (ref 0–1.2)
BILIRUB UR QL STRIP: NEGATIVE
BUN SERPL-MCNC: 12 MG/DL (ref 6–20)
BUN/CREAT SERPL: 16.9 (ref 7–25)
CALCIUM SPEC-SCNC: 9.5 MG/DL (ref 8.6–10.5)
CHLORIDE SERPL-SCNC: 97 MMOL/L (ref 98–107)
CHOLEST SERPL-MCNC: 108 MG/DL (ref 0–200)
CLARITY UR: CLEAR
CO2 SERPL-SCNC: 30.4 MMOL/L (ref 22–29)
COLOR UR: YELLOW
CREAT SERPL-MCNC: 0.71 MG/DL (ref 0.76–1.27)
GFR SERPL CREATININE-BSD FRML MDRD: 139 ML/MIN/1.73
GLOBULIN UR ELPH-MCNC: 3.2 GM/DL
GLUCOSE SERPL-MCNC: 149 MG/DL (ref 65–99)
GLUCOSE UR STRIP-MCNC: ABNORMAL MG/DL
HBA1C MFR BLD: 7 % (ref 4.8–5.6)
HDLC SERPL-MCNC: 37 MG/DL (ref 40–60)
HGB UR QL STRIP.AUTO: NEGATIVE
KETONES UR QL STRIP: NEGATIVE
LDLC SERPL CALC-MCNC: 51 MG/DL (ref 0–100)
LDLC/HDLC SERPL: 1.32 {RATIO}
LEUKOCYTE ESTERASE UR QL STRIP.AUTO: NEGATIVE
NITRITE UR QL STRIP: NEGATIVE
PH UR STRIP.AUTO: 5.5 [PH] (ref 4.6–8)
POTASSIUM SERPL-SCNC: 4.2 MMOL/L (ref 3.5–5.2)
PROT SERPL-MCNC: 7.7 G/DL (ref 6–8.5)
PROT UR QL STRIP: NEGATIVE
RBC # UR: NORMAL /HPF
REF LAB TEST METHOD: NORMAL
SODIUM SERPL-SCNC: 133 MMOL/L (ref 136–145)
SP GR UR STRIP: 1.01 (ref 1–1.03)
SQUAMOUS #/AREA URNS HPF: NORMAL /HPF
TRIGL SERPL-MCNC: 110 MG/DL (ref 0–150)
UROBILINOGEN UR QL STRIP: ABNORMAL
VLDLC SERPL-MCNC: 20 MG/DL (ref 5–40)
WBC UR QL AUTO: NORMAL /HPF

## 2020-10-08 PROCEDURE — 73560 X-RAY EXAM OF KNEE 1 OR 2: CPT | Performed by: INTERNAL MEDICINE

## 2020-10-08 PROCEDURE — 80061 LIPID PANEL: CPT | Performed by: INTERNAL MEDICINE

## 2020-10-08 PROCEDURE — 36415 COLL VENOUS BLD VENIPUNCTURE: CPT | Performed by: INTERNAL MEDICINE

## 2020-10-08 PROCEDURE — 83036 HEMOGLOBIN GLYCOSYLATED A1C: CPT | Performed by: INTERNAL MEDICINE

## 2020-10-08 PROCEDURE — 99214 OFFICE O/P EST MOD 30 MIN: CPT | Performed by: INTERNAL MEDICINE

## 2020-10-08 PROCEDURE — 90471 IMMUNIZATION ADMIN: CPT | Performed by: INTERNAL MEDICINE

## 2020-10-08 PROCEDURE — 72100 X-RAY EXAM L-S SPINE 2/3 VWS: CPT | Performed by: INTERNAL MEDICINE

## 2020-10-08 PROCEDURE — 90686 IIV4 VACC NO PRSV 0.5 ML IM: CPT | Performed by: INTERNAL MEDICINE

## 2020-10-08 PROCEDURE — 81001 URINALYSIS AUTO W/SCOPE: CPT | Performed by: INTERNAL MEDICINE

## 2020-10-08 PROCEDURE — 80053 COMPREHEN METABOLIC PANEL: CPT | Performed by: INTERNAL MEDICINE

## 2020-10-08 RX ORDER — HYDROCODONE BITARTRATE AND ACETAMINOPHEN 5; 325 MG/1; MG/1
1 TABLET ORAL EVERY 6 HOURS PRN
Qty: 60 TABLET | Refills: 0 | Status: SHIPPED | OUTPATIENT
Start: 2020-10-08 | End: 2020-12-03 | Stop reason: SDUPTHER

## 2020-10-08 NOTE — PROGRESS NOTES
Subjective   Go Cho is a 55 y.o. male.  Sent here for low side of left-sided back pain without sciatica has had intermittent back trouble for it is flared without specific injury also is got pain to both knees he is follow-up on diabetes blood pressure lipids also  Body mass index is 38.86 kg/m².  History of Present Illness   Patient due for follow-up for multiple things but is actually here as a driving force for increased pain lower back on the left.  No specific injury back pain is actually lower left to the at midline and to the left of midline.  Trouble with walking m/around much..  No reported difficulty with urinating or bowel movements.  Glucose tends to range high to normal for him on how well or what he eats or how well he follows his diet overall is doing fair blood pressure is satisfactory today does need lipids rechecked as well if those are good for follow-up in 6 months time  Regalis are none.  Family is positive heart disease.  Current smoker quarter pack per day.  Review of Systems   Constitutional: Negative.    HENT: Negative.    Eyes: Negative.    Respiratory: Negative.    Cardiovascular: Negative.    Gastrointestinal: Negative.    Endocrine: Negative.    Genitourinary: Negative.    Musculoskeletal: Positive for back pain and joint swelling.   Skin: Negative.    Allergic/Immunologic: Negative.    Neurological: Negative.    Hematological: Negative.    Psychiatric/Behavioral: Negative.        Objective   Vitals:    10/08/20 1313   BP: 138/78   Pulse: 92   Temp: 97.8 °F (36.6 °C)   SpO2: 96%   Weight: 123 kg (270 lb 12.8 oz)     Physical Exam  Vitals signs and nursing note reviewed.   Constitutional:       Appearance: Normal appearance.   HENT:      Head: Normocephalic and atraumatic.   Eyes:      Conjunctiva/sclera: Conjunctivae normal.      Pupils: Pupils are equal, round, and reactive to light.   Cardiovascular:      Rate and Rhythm: Normal rate and regular rhythm.      Heart sounds:  Normal heart sounds.   Pulmonary:      Effort: Pulmonary effort is normal.      Breath sounds: Normal breath sounds.   Abdominal:      General: Abdomen is flat. Bowel sounds are normal.      Palpations: Abdomen is soft.   Skin:     General: Skin is warm and dry.   Neurological:      General: No focal deficit present.      Mental Status: He is alert.      Comments: Patient with equivocal straight leg raise on the left at 50 degrees.  Negative on the right.  Knee jerks trace bilaterally.         Lab Results   Component Value Date    INR 1.17 (H) 10/02/2016       Procedures  X-ray of both knees left knee shows narrowing of both medial lateral meniscus.  Mild degenerative changes noted some arthritis no comparison x-ray.    X-ray of right knee shows mild narrowing of the lateral meniscus or narrowing of the medial meniscus.  Some arthritis no comparison available for either knee.    LS-spine x-ray shows significant narrowing at L5-S1 some loss of body curve some spurring as well.  Several of the vertebrae no comparison available no other bony or soft tissue normality is noted        .Assessment/Plan   1.  Lower back pain on left.  Plan relative rest did refill his hydrocodone pain persist like we will need MRI lumbar spine he wishes to wait on this    2.  Chronic knee pain bilaterally both knees problematic I did encourage him to see orthopedic surgeon for further evaluation consideration for steroid injection knees as well initiate referral for that    3.  Type 2 diabetes await hemoglobin A1c for further recommendations    4.  Hypertension controlled by history continue present medicine with recheck in 6 months time    5.  Hyperlipidemia await pending labs if satisfactory continue present statin with recheck in 6 months time as well.    Much of this encounter note is an electronic transcription/translation of spoken language to printed text.  The electronic translation of spoken language may permit erroneous, or at  times, nonsensical words or phrases to be inadvertently transcribed.  Although I have reviewed the note for such errors, some may still exist. If there are questions or for further clarification, please contact me.  There are no diagnoses linked to this encounter.

## 2020-10-19 RX ORDER — AMLODIPINE BESYLATE 5 MG/1
TABLET ORAL
Qty: 30 TABLET | Refills: 5 | Status: SHIPPED | OUTPATIENT
Start: 2020-10-19 | End: 2021-06-07 | Stop reason: SDUPTHER

## 2020-10-19 RX ORDER — AMLODIPINE BESYLATE 5 MG/1
TABLET ORAL
Qty: 30 TABLET | Refills: 5 | Status: SHIPPED | OUTPATIENT
Start: 2020-10-19 | End: 2021-01-29 | Stop reason: SDUPTHER

## 2020-11-03 ENCOUNTER — TELEPHONE (OUTPATIENT)
Dept: FAMILY MEDICINE CLINIC | Facility: CLINIC | Age: 55
End: 2020-11-03

## 2020-11-03 NOTE — TELEPHONE ENCOUNTER
PATIENT CALLED AND STATED THAT HIS INSURANCE WILL NOT COVER THE INVOKANA 100 MG tablet, BUT THEY WILL COVER FARXIGA. PATIENT REQUESTING FARXIGA TO BE SENT TO HIS PHARMACY TO REPLACE THE INVOKANA.    PHARMACY CONFIRMED:   [id number] [address] [place] [address]  AT [facility] - [phone] [facility] [phone] FX    PATIENT CALLBACK: [phone]

## 2020-11-06 RX ORDER — LISINOPRIL 10 MG/1
10 TABLET ORAL DAILY
Qty: 30 TABLET | Refills: 11 | Status: SHIPPED | OUTPATIENT
Start: 2020-11-06 | End: 2021-05-13

## 2020-11-16 RX ORDER — DULAGLUTIDE 0.75 MG/.5ML
INJECTION, SOLUTION SUBCUTANEOUS
Qty: 2 ML | Refills: 5 | Status: SHIPPED | OUTPATIENT
Start: 2020-11-16 | End: 2021-05-17 | Stop reason: SDUPTHER

## 2020-11-17 RX ORDER — ATORVASTATIN CALCIUM 10 MG/1
10 TABLET, FILM COATED ORAL DAILY
Qty: 90 TABLET | Refills: 1 | Status: SHIPPED | OUTPATIENT
Start: 2020-11-17 | End: 2021-08-06 | Stop reason: SDUPTHER

## 2020-11-17 RX ORDER — QUETIAPINE FUMARATE 50 MG/1
100 TABLET, FILM COATED ORAL NIGHTLY
Qty: 180 TABLET | Refills: 3 | Status: SHIPPED | OUTPATIENT
Start: 2020-11-17 | End: 2021-10-07 | Stop reason: SDUPTHER

## 2020-12-03 ENCOUNTER — TELEPHONE (OUTPATIENT)
Dept: FAMILY MEDICINE CLINIC | Facility: CLINIC | Age: 55
End: 2020-12-03

## 2020-12-03 DIAGNOSIS — M25.561 CHRONIC PAIN OF BOTH KNEES: ICD-10-CM

## 2020-12-03 DIAGNOSIS — G89.29 CHRONIC PAIN OF BOTH KNEES: ICD-10-CM

## 2020-12-03 DIAGNOSIS — M25.562 CHRONIC PAIN OF BOTH KNEES: ICD-10-CM

## 2020-12-03 RX ORDER — HYDROCODONE BITARTRATE AND ACETAMINOPHEN 5; 325 MG/1; MG/1
1 TABLET ORAL EVERY 6 HOURS PRN
Qty: 60 TABLET | Refills: 0 | Status: SHIPPED | OUTPATIENT
Start: 2020-12-03 | End: 2021-01-29 | Stop reason: SDUPTHER

## 2020-12-17 RX ORDER — DAPAGLIFLOZIN 5 MG/1
TABLET, FILM COATED ORAL
Qty: 30 TABLET | Refills: 1 | Status: SHIPPED | OUTPATIENT
Start: 2020-12-17 | End: 2021-02-25 | Stop reason: SDUPTHER

## 2021-01-05 RX ORDER — GLIMEPIRIDE 2 MG/1
2 TABLET ORAL
Qty: 30 TABLET | Refills: 0 | Status: SHIPPED | OUTPATIENT
Start: 2021-01-05 | End: 2021-02-19 | Stop reason: SDUPTHER

## 2021-01-08 ENCOUNTER — TELEPHONE (OUTPATIENT)
Dept: FAMILY MEDICINE CLINIC | Facility: CLINIC | Age: 56
End: 2021-01-08

## 2021-01-08 RX ORDER — ESOMEPRAZOLE MAGNESIUM 40 MG/1
40 CAPSULE, DELAYED RELEASE ORAL
Qty: 90 CAPSULE | Refills: 0 | Status: SHIPPED | OUTPATIENT
Start: 2021-01-08 | End: 2021-01-29

## 2021-01-11 ENCOUNTER — APPOINTMENT (OUTPATIENT)
Dept: CT IMAGING | Facility: HOSPITAL | Age: 56
End: 2021-01-11

## 2021-01-13 RX ORDER — OMEPRAZOLE 40 MG/1
40 CAPSULE, DELAYED RELEASE ORAL DAILY
Qty: 30 CAPSULE | Refills: 5 | Status: SHIPPED | OUTPATIENT
Start: 2021-01-13

## 2021-01-19 ENCOUNTER — HOSPITAL ENCOUNTER (OUTPATIENT)
Dept: CT IMAGING | Facility: HOSPITAL | Age: 56
Discharge: HOME | End: 2021-01-19
Admitting: INTERNAL MEDICINE

## 2021-01-19 DIAGNOSIS — R91.1 LUNG NODULE: ICD-10-CM

## 2021-01-19 PROCEDURE — 71250 CT THORAX DX C-: CPT

## 2021-01-29 ENCOUNTER — OFFICE VISIT (OUTPATIENT)
Dept: FAMILY MEDICINE CLINIC | Facility: CLINIC | Age: 56
End: 2021-01-29

## 2021-01-29 VITALS
TEMPERATURE: 97.3 F | WEIGHT: 270 LBS | SYSTOLIC BLOOD PRESSURE: 140 MMHG | HEART RATE: 80 BPM | OXYGEN SATURATION: 96 % | DIASTOLIC BLOOD PRESSURE: 80 MMHG | BODY MASS INDEX: 38.65 KG/M2 | HEIGHT: 70 IN | RESPIRATION RATE: 20 BRPM

## 2021-01-29 DIAGNOSIS — L98.9 SKIN LESION: ICD-10-CM

## 2021-01-29 DIAGNOSIS — Z51.81 MEDICATION MONITORING ENCOUNTER: ICD-10-CM

## 2021-01-29 DIAGNOSIS — M25.561 CHRONIC PAIN OF BOTH KNEES: ICD-10-CM

## 2021-01-29 DIAGNOSIS — M54.50 CHRONIC BILATERAL LOW BACK PAIN WITHOUT SCIATICA: ICD-10-CM

## 2021-01-29 DIAGNOSIS — I10 ESSENTIAL HYPERTENSION: ICD-10-CM

## 2021-01-29 DIAGNOSIS — M25.562 CHRONIC PAIN OF BOTH KNEES: ICD-10-CM

## 2021-01-29 DIAGNOSIS — G89.29 CHRONIC PAIN OF BOTH KNEES: ICD-10-CM

## 2021-01-29 DIAGNOSIS — E11.9 TYPE 2 DIABETES MELLITUS WITHOUT COMPLICATION, WITHOUT LONG-TERM CURRENT USE OF INSULIN (HCC): Primary | ICD-10-CM

## 2021-01-29 DIAGNOSIS — E78.5 HYPERLIPIDEMIA, UNSPECIFIED HYPERLIPIDEMIA TYPE: ICD-10-CM

## 2021-01-29 DIAGNOSIS — G89.29 CHRONIC BILATERAL LOW BACK PAIN WITHOUT SCIATICA: ICD-10-CM

## 2021-01-29 LAB
ALBUMIN SERPL-MCNC: 4.7 G/DL (ref 3.5–5.2)
ALBUMIN/GLOB SERPL: 2 G/DL
ALP SERPL-CCNC: 80 U/L (ref 39–117)
ALT SERPL W P-5'-P-CCNC: 24 U/L (ref 1–41)
ANION GAP SERPL CALCULATED.3IONS-SCNC: 8.3 MMOL/L (ref 5–15)
AST SERPL-CCNC: 21 U/L (ref 1–40)
BILIRUB SERPL-MCNC: 0.3 MG/DL (ref 0–1.2)
BUN SERPL-MCNC: 15 MG/DL (ref 6–20)
BUN/CREAT SERPL: 23.1 (ref 7–25)
CALCIUM SPEC-SCNC: 9.9 MG/DL (ref 8.6–10.5)
CHLORIDE SERPL-SCNC: 95 MMOL/L (ref 98–107)
CHOLEST SERPL-MCNC: 114 MG/DL (ref 0–200)
CO2 SERPL-SCNC: 32.7 MMOL/L (ref 22–29)
CREAT SERPL-MCNC: 0.65 MG/DL (ref 0.76–1.27)
ERYTHROCYTE [DISTWIDTH] IN BLOOD BY AUTOMATED COUNT: 13.6 % (ref 12.3–15.4)
GFR SERPL CREATININE-BSD FRML MDRD: >150 ML/MIN/1.73
GLOBULIN UR ELPH-MCNC: 2.4 GM/DL
GLUCOSE SERPL-MCNC: 178 MG/DL (ref 65–99)
HBA1C MFR BLD: 6.9 % (ref 4.8–5.6)
HCT VFR BLD AUTO: 46.1 % (ref 37.5–51)
HDLC SERPL-MCNC: 36 MG/DL (ref 40–60)
HGB BLD-MCNC: 15 G/DL (ref 13–17.7)
LDLC SERPL CALC-MCNC: 60 MG/DL (ref 0–100)
LDLC/HDLC SERPL: 1.63 {RATIO}
LYMPHOCYTES # BLD AUTO: 2.3 10*3/MM3 (ref 0.7–3.1)
LYMPHOCYTES NFR BLD AUTO: 26.4 % (ref 19.6–45.3)
MCH RBC QN AUTO: 29.1 PG (ref 26.6–33)
MCHC RBC AUTO-ENTMCNC: 32.6 G/DL (ref 31.5–35.7)
MCV RBC AUTO: 89.3 FL (ref 79–97)
MONOCYTES # BLD AUTO: 0.4 10*3/MM3 (ref 0.1–0.9)
MONOCYTES NFR BLD AUTO: 4.7 % (ref 5–12)
NEUTROPHILS NFR BLD AUTO: 6.1 10*3/MM3 (ref 1.7–7)
NEUTROPHILS NFR BLD AUTO: 68.9 % (ref 42.7–76)
PLATELET # BLD AUTO: 230 10*3/MM3 (ref 140–450)
PMV BLD AUTO: 7.6 FL (ref 6–12)
POTASSIUM SERPL-SCNC: 4.7 MMOL/L (ref 3.5–5.2)
PROT SERPL-MCNC: 7.1 G/DL (ref 6–8.5)
RBC # BLD AUTO: 5.17 10*6/MM3 (ref 4.14–5.8)
SODIUM SERPL-SCNC: 136 MMOL/L (ref 136–145)
TRIGL SERPL-MCNC: 96 MG/DL (ref 0–150)
TSH SERPL DL<=0.05 MIU/L-ACNC: 0.68 UIU/ML (ref 0.27–4.2)
VLDLC SERPL-MCNC: 18 MG/DL (ref 5–40)
WBC # BLD AUTO: 8.9 10*3/MM3 (ref 3.4–10.8)

## 2021-01-29 PROCEDURE — 80053 COMPREHEN METABOLIC PANEL: CPT | Performed by: NURSE PRACTITIONER

## 2021-01-29 PROCEDURE — 85025 COMPLETE CBC W/AUTO DIFF WBC: CPT | Performed by: NURSE PRACTITIONER

## 2021-01-29 PROCEDURE — 80061 LIPID PANEL: CPT | Performed by: NURSE PRACTITIONER

## 2021-01-29 PROCEDURE — 84443 ASSAY THYROID STIM HORMONE: CPT | Performed by: NURSE PRACTITIONER

## 2021-01-29 PROCEDURE — 36415 COLL VENOUS BLD VENIPUNCTURE: CPT | Performed by: NURSE PRACTITIONER

## 2021-01-29 PROCEDURE — 99214 OFFICE O/P EST MOD 30 MIN: CPT | Performed by: NURSE PRACTITIONER

## 2021-01-29 PROCEDURE — 83036 HEMOGLOBIN GLYCOSYLATED A1C: CPT | Performed by: NURSE PRACTITIONER

## 2021-01-29 RX ORDER — AZELASTINE 1 MG/ML
2 SPRAY, METERED NASAL 2 TIMES DAILY
Qty: 30 ML | Refills: 11 | Status: SHIPPED | OUTPATIENT
Start: 2021-01-29 | End: 2021-09-28 | Stop reason: SDUPTHER

## 2021-01-29 RX ORDER — HYDROCODONE BITARTRATE AND ACETAMINOPHEN 5; 325 MG/1; MG/1
1 TABLET ORAL EVERY 6 HOURS PRN
Qty: 60 TABLET | Refills: 0 | Status: SHIPPED | OUTPATIENT
Start: 2021-01-29

## 2021-01-29 NOTE — PATIENT INSTRUCTIONS
Labs today will call with results.   Cont to monitor BS and BP at home, call with problems,   Cont diet and exercise as tolerated.   Apply bactroban oint to affected area bid if symptoms persist 5-7 days call office will consider US if needed. He will monitor for s/s infection and call with problems.   Will refill norco x1, refer to PM will call with appt. Advised will need to see PM for chronic use of daily pain medication.   Patient agrees with plan of care and understands instructions. Call if worsening symptoms or any problems or concerns.
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
## 2021-02-02 ENCOUNTER — OFFICE VISIT (OUTPATIENT)
Dept: PAIN MEDICINE | Facility: CLINIC | Age: 56
End: 2021-02-02

## 2021-02-02 VITALS
RESPIRATION RATE: 20 BRPM | HEIGHT: 70 IN | HEART RATE: 106 BPM | TEMPERATURE: 98.4 F | DIASTOLIC BLOOD PRESSURE: 79 MMHG | SYSTOLIC BLOOD PRESSURE: 158 MMHG | BODY MASS INDEX: 38.74 KG/M2 | OXYGEN SATURATION: 93 %

## 2021-02-02 DIAGNOSIS — M47.816 SPONDYLOSIS OF LUMBAR REGION WITHOUT MYELOPATHY OR RADICULOPATHY: Primary | ICD-10-CM

## 2021-02-02 DIAGNOSIS — G89.4 CHRONIC PAIN SYNDROME: ICD-10-CM

## 2021-02-02 DIAGNOSIS — M47.816 LUMBAR FACET JOINT SYNDROME: ICD-10-CM

## 2021-02-02 DIAGNOSIS — M51.36 DDD (DEGENERATIVE DISC DISEASE), LUMBAR: ICD-10-CM

## 2021-02-02 LAB
POC AMPHETAMINES: NEGATIVE
POC BARBITURATES: NEGATIVE
POC BENZODIAZEPHINES: NEGATIVE
POC COCAINE: NEGATIVE
POC METHADONE: NEGATIVE
POC METHAMPHETAMINE SCREEN URINE: NEGATIVE
POC OPIATES: POSITIVE
POC OXYCODONE: NEGATIVE
POC PHENCYCLIDINE: NEGATIVE
POC PROPOXYPHENE: NEGATIVE
POC THC: NEGATIVE
POC TRICYCLIC ANTIDEPRESSANTS: NEGATIVE

## 2021-02-02 PROCEDURE — 99203 OFFICE O/P NEW LOW 30 MIN: CPT | Performed by: ANESTHESIOLOGY

## 2021-02-02 PROCEDURE — 80305 DRUG TEST PRSMV DIR OPT OBS: CPT | Performed by: ANESTHESIOLOGY

## 2021-02-02 NOTE — PATIENT INSTRUCTIONS
Facet Syndrome    Facet syndrome is a condition in which joints (facet joints) that connect the bones of the spine (vertebrae) become damaged. Facet joints help the spine move, and they wear down (degenerate) or become inflamed as a person gets older. This can cause pain and stiffness in the neck (cervical facet syndrome) or in the lower back (lumbar facet syndrome).  When a facet joint becomes damaged, a vertebra may slip forward, out of its normal place in the spine. Damage to a facet joint can also damage nerves near the spine, which can cause tingling or weakness in the arms or legs. Facet syndrome can make it difficult to turn the head or bend backward without pain. This condition usually gets worse over time.  What are the causes?  Common causes of this condition include:  · Age-related inflammation of the facet joints (arthritis) that may create extra bone on the joint surface (bone spurs).  · Age-related decrease in space between the vertebrae (disk degeneration and cartilage degeneration).  · Repetitive stress on the spine, such as repetitive twisting of the back.  · Injury to the back or neck.  · Poor posture.  · Being overweight or obese.  What increases the risk?  The following factors may make you more likely to develop this condition:  · Playing contact sports.  · Doing activities or sports that involve repetitive twisting motions or repetitive heavy lifting.  · Having poor back strength and flexibility.  · Having another back or spine condition, such as scoliosis.  What are the signs or symptoms?  Symptoms of facet syndrome may include:  · An ache in the neck or lower back. This may get worse when you twist or arch your back, or when you look up.  · Stiffness in the neck or lower back.  · Numbness, tingling, or weakness in the arms or legs.  Symptoms of cervical facet syndrome may include:  · Headache.  · Pain in the back of the head.  · Pain in the shoulder blades.  Symptoms of lumbar facet syndrome  may include pain in any of the following areas:  · Groin.  · Thighs.  · Lower back.  · Buttocks.  · Hips.  How is this diagnosed?  This condition may be diagnosed based on:  · Your symptoms.  · Your medical history.  · A physical exam.  · Imaging tests, such as:  ? X-rays.  ? MRI.  · A procedure in which medicines to numb the area (local anesthetic) and medicines to reduce inflammation (steroids) are injected into your affected joint (facet joint block). This helps to diagnose and may relieve pain.  How is this treated?  This condition may be treated by:  · Stopping or modifying activities that make your condition worse.  · Taking medicines that help reduce pain and inflammation.  · Having steroid injections to help reduce severe pain.  · Doing physical therapy.  · Following a weight-control plan.  · Having a procedure called radiofrequency ablation. This is a surgical procedure that uses high-frequency radio waves to block signals from affected nerves.  · Having surgery to stabilize your spine or to take pressure off your nerves. This is rare.  Follow these instructions at home:  Medicines  · Take over-the-counter and prescription medicines only as told by your health care provider.  · Ask your health care provider if the medicine prescribed to you:  ? Requires you to avoid driving or using heavy machinery.  ? Can cause constipation. You may need to take actions to prevent or treat constipation, such as:  § Drink enough fluid to keep your urine pale yellow.  § Take over-the-counter or prescription medicines.  § Eat foods that are high in fiber, such as beans, whole grains, and fresh fruits and vegetables.  § Limit foods that are high in fat and processed sugars, such as fried or sweet foods.  Activity  · Rest your neck and back as told by your health care provider.  · Return to your normal activities as told by your health care provider. Ask your health care provider what activities are safe for you.  · If physical  therapy was prescribed, do exercises as told by your health care provider.  General instructions    · Do not use any products that contain nicotine or tobacco, such as cigarettes, e-cigarettes, and chewing tobacco. These can delay healing. If you need help quitting, ask your health care provider.  · Use good posture throughout your daily activities. Good posture means that your spine is in its natural S-curve position (your spine is neutral), your shoulders are pulled back slightly, and your head is not forward.  · Maintain a healthy weight. Follow instructions from your health care provider for weight control. These may include dietary restrictions.  · Keep all follow-up visits as told by your health care provider. This is important.  Contact a health care provider if you have:  · Symptoms that get worse or do not improve in 2-4 weeks of treatment.  · New symptoms.  Get help right away if you:  · Have numbness or weakness in any part of your body.  · Lose control over your bladder or bowel functions.  Summary  · Facet syndrome is a condition in which joints (facet joints) that connect the bones of the spine (vertebrae) become damaged. This can cause pain and stiffness in the neck (cervical facet syndrome) or in the lower back (lumbar facet syndrome).  · Rest your neck and back as told by your health care provider.  · If physical therapy was prescribed, do exercises as told by your health care provider.  · Take over-the-counter and prescription medicines only as told by your health care provider.  · Contact a health care provider if you have symptoms that get worse or do not improve in 2-4 weeks of treatment, or if you have new symptoms.  This information is not intended to replace advice given to you by your health care provider. Make sure you discuss any questions you have with your health care provider.  Document Revised: 11/26/2019 Document Reviewed: 12/01/2019  Elsevier Patient Education © 2020 [employer]    Medial Branch Nerve Block    Medial branch nerve block is a procedure to numb the nerves that supply the joints between your spinal bones (facet joints). The facet joints are located on the back of your spine. You may have the procedure on your neck or your upper, middle, or lower spine.  During this procedure, your health care provider will inject a numbing medicine (local anesthetic) around the medial nerves near the facet joint being treated. If more than one facet joint is causing pain, you may have more than one injection. In most cases, an anti-inflammatory medicine (steroid) will also be injected. You may need this procedure if:  · You have back pain from wear and tear (osteoarthritis) of your facet joint.  · You have an injury to a facet joint.  · Your health care provider wants to diagnose a facet joint as the cause of your pain. If the procedure relieves the pain, this indicates that the facet joint was the cause.  The local anesthetic will relieve pain for several days. The steroid may continue to relieve pain for several weeks. If your pain returns when the medicines wear off, this procedure may be repeated.  Tell a health care provider about:  · Any allergies you have.  · All medicines you are taking, including vitamins, herbs, eye drops, creams, and over-the-counter medicines.  · Any problems you or family members have had with anesthetic medicines.  · Any blood disorders you have.  · Any surgeries you have had.  · Any medical conditions you have.  · Whether you are pregnant or may be pregnant.  What are the risks?  Generally, this is a safe procedure. However, problems may occur, including:  · Infection.  · Bleeding.  · Allergic reactions to medicines or dyes.  · Damage to other structures or organs.  · Injection of the anesthetic into a blood vessel, which may decrease blood supply to your spinal cord and cause damage.  · Spread of the anesthetic to nearby nerves, which may cause temporary  weakness or numbness.  What happens before the procedure?  · Ask your health care provider about:  ? Changing or stopping your regular medicines. This is especially important if you are taking diabetes medicines or blood thinners.  ? Taking medicines such as aspirin and ibuprofen. These medicines can thin your blood. Do not take these medicines unless your health care provider tells you to take them.  ? Taking over-the-counter medicines, vitamins, herbs, and supplements.  · Plan to have someone take you home from the hospital or clinic.  · Follow instructions from your health care provider about any eating or drinking restrictions before the procedure.  · Ask your health care provider what steps will be taken to help prevent infection. These may include:  ? Removing hair at the injection site.  ? Washing skin with a germ-killing soap.  What happens during the procedure?  · An IV may be inserted into one of your veins.  · You will be given one or more of the following:  ? A medicine to help you relax (sedative).  ? A medicine to numb the area (local anesthetic). Your health care provider will feel for the facet joint or joints that are causing pain and inject a short-acting local anesthetic into the skin over the joint or joints.  · Your health care provider will then pass a needle into the area around the facet joint.  · Your health care provider may use a type of X-ray (fluoroscopy) to look at images of your spinal cord. If so, the health care provider will inject a small amount of dye into the facet joint area. The dye will show up on fluoroscopy and help locate the exact area to inject the long-acting anesthetic.  · The medicine will then be injected. Along with the long-acting anesthetic, a steroid medicine may also be injected.  · The needle will be removed, and a bandage will be placed over the injection site.  The procedure may vary among health care providers and hospitals.  What can I expect after the  procedure?  · Your blood pressure, heart rate, breathing rate, and blood oxygen level will be monitored until you leave the hospital or clinic.  · You should feel less pain in your back.  · You may have some soreness around the injection site.  Follow these instructions at home:  Injection site care  · Leave your bandage on for 24 hours.  · Do not take baths, swim, or use a hot tub until your health care provider approves.  · Check your injection site every day for signs of infection. Check for:  ? Redness, swelling, or pain.  ? Fluid or blood.  ? Warmth.  ? Pus or a bad smell.  · If directed, put ice on the injection area:  ? Put ice in a plastic bag.  ? Place a towel between your skin and the bag.  ? Leave the ice on for 20 minutes, 2-3 times a day.  General instructions  · Take over-the-counter and prescription medicines only as told by your health care provider.  · Do not drive for 24 hours if you were given a sedative during the procedure.  · Return to your normal activities as told by your health care provider. Ask your health care provider what activities are safe for you.  · Keep a log of your pain after the procedure. Keep track of how much pain you have and when you have it. This will help your health care provider plan your future treatment.  ? You should have relief of pain from the anesthetic for up to 3 days.  ? After that you may notice some pain again until the steroid starts to help. Pain relief from the steroid may last for a few weeks.  · Keep all follow-up visits as told by your health care provider. This is important.  Contact your health care provider if:  · Your pain is not relieved or gets worse at home.  · You have a fever or chills.  · You have any signs of infection.  · You develop any numbness or weakness.  Summary  · Medial branch nerve block is a procedure to numb the nerves that supply the joints between your spinal bones (facet joint).  · You may have the procedure on your neck or  "your upper, middle, or lower spine.  · This procedure may be done both to diagnose and relieve facet joint pain.  · A long-acting local anesthetic is injected close to the nerve that supplies the facet joint. An anti-inflammatory medicine (steroid) will also be injected.  This information is not intended to replace advice given to you by your health care provider. Make sure you discuss any questions you have with your health care provider.  Document Revised: 04/10/2020 Document Reviewed: 08/22/2019  Elsevier Patient Education © 2020 [employer]      Radiofrequency ablation (RFA):     - Radiofrequency ablation is a term used to describe cauterization or \"burning.\"   - In pain management, we can use this technique with a special needle to target and destroy areas that are causing your pain.   - In most cases, you must have TWO successful \"test injections\" before you are a candidate for RFA.    After your RFA:   - Because heat is used in this technique, it is common to have soreness after the procedure.  Sometimes \"neuritis\" occurs, which feels like tingling, prickly, or sunburn under the skin sensations.   - Ice packs are helpful in decreasing this soreness and preventing post-procedure \"neuritis\" pain.  Use an ice pack for 20 minutes at a time at least 3 times the day of and the day after your procedure.   - It is common to have leg numbness or weakness the day of your procedure, but this should wear off by the following day.     "
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
## 2021-02-11 ENCOUNTER — TELEPHONE (OUTPATIENT)
Dept: PAIN MEDICINE | Facility: CLINIC | Age: 56
End: 2021-02-11

## 2021-02-11 NOTE — TELEPHONE ENCOUNTER
I called patient and explained to him that due Dr. Kelsey feeling his UDS was inappropriate, she could only do interventional procedures on him. The patient said he didn't really want procedures and he thanked me and hung up. I will send the patient a letter in the mail stating that he will be interventional only.

## 2021-02-12 NOTE — TELEPHONE ENCOUNTER
His appointment was for a hydrocodone refill and/or to schedule a procedure.  We can keep it to discuss non-opioid options.

## 2021-02-19 RX ORDER — GLIMEPIRIDE 2 MG/1
2 TABLET ORAL
Qty: 30 TABLET | Refills: 5 | Status: SHIPPED | OUTPATIENT
Start: 2021-02-19 | End: 2021-08-06

## 2021-02-25 RX ORDER — DAPAGLIFLOZIN 5 MG/1
5 TABLET, FILM COATED ORAL DAILY
Qty: 30 TABLET | Refills: 1 | Status: SHIPPED | OUTPATIENT
Start: 2021-02-25 | End: 2021-04-26

## 2021-04-26 RX ORDER — METFORMIN HYDROCHLORIDE 500 MG/1
500 TABLET, EXTENDED RELEASE ORAL 3 TIMES DAILY
Qty: 270 TABLET | Refills: 1 | Status: SHIPPED | OUTPATIENT
Start: 2021-04-26 | End: 2021-10-07 | Stop reason: SDUPTHER

## 2021-04-26 RX ORDER — DAPAGLIFLOZIN 5 MG/1
5 TABLET, FILM COATED ORAL DAILY
Qty: 30 TABLET | Refills: 1 | Status: SHIPPED | OUTPATIENT
Start: 2021-04-26 | End: 2021-07-08

## 2021-05-04 ENCOUNTER — OFFICE VISIT (OUTPATIENT)
Dept: FAMILY MEDICINE CLINIC | Facility: CLINIC | Age: 56
End: 2021-05-04

## 2021-05-04 VITALS
HEIGHT: 70 IN | TEMPERATURE: 97.5 F | SYSTOLIC BLOOD PRESSURE: 140 MMHG | OXYGEN SATURATION: 97 % | DIASTOLIC BLOOD PRESSURE: 70 MMHG | BODY MASS INDEX: 37.22 KG/M2 | WEIGHT: 260 LBS | HEART RATE: 94 BPM

## 2021-05-04 DIAGNOSIS — G89.29 CHRONIC LEFT-SIDED LOW BACK PAIN WITHOUT SCIATICA: ICD-10-CM

## 2021-05-04 DIAGNOSIS — M54.50 CHRONIC LEFT-SIDED LOW BACK PAIN WITHOUT SCIATICA: ICD-10-CM

## 2021-05-04 DIAGNOSIS — E11.65 TYPE 2 DIABETES MELLITUS WITH HYPERGLYCEMIA, UNSPECIFIED WHETHER LONG TERM INSULIN USE (HCC): Primary | ICD-10-CM

## 2021-05-04 DIAGNOSIS — M54.2 NECK PAIN: ICD-10-CM

## 2021-05-04 DIAGNOSIS — Z51.81 MEDICATION MONITORING ENCOUNTER: ICD-10-CM

## 2021-05-04 LAB
ALBUMIN SERPL-MCNC: 4.4 G/DL (ref 3.5–5.2)
ALBUMIN/GLOB SERPL: 1.5 G/DL
ALP SERPL-CCNC: 77 U/L (ref 39–117)
ALT SERPL W P-5'-P-CCNC: 24 U/L (ref 1–41)
ANION GAP SERPL CALCULATED.3IONS-SCNC: 10 MMOL/L (ref 5–15)
AST SERPL-CCNC: 24 U/L (ref 1–40)
BILIRUB SERPL-MCNC: 0.2 MG/DL (ref 0–1.2)
BUN SERPL-MCNC: 8 MG/DL (ref 6–20)
BUN/CREAT SERPL: 11.4 (ref 7–25)
CALCIUM SPEC-SCNC: 9.3 MG/DL (ref 8.6–10.5)
CHLORIDE SERPL-SCNC: 97 MMOL/L (ref 98–107)
CO2 SERPL-SCNC: 30 MMOL/L (ref 22–29)
CREAT SERPL-MCNC: 0.7 MG/DL (ref 0.76–1.27)
GFR SERPL CREATININE-BSD FRML MDRD: 141 ML/MIN/1.73
GLOBULIN UR ELPH-MCNC: 2.9 GM/DL
GLUCOSE SERPL-MCNC: 120 MG/DL (ref 65–99)
HBA1C MFR BLD: 6.81 % (ref 4.8–5.6)
POTASSIUM SERPL-SCNC: 4.4 MMOL/L (ref 3.5–5.2)
PROT SERPL-MCNC: 7.3 G/DL (ref 6–8.5)
SODIUM SERPL-SCNC: 137 MMOL/L (ref 136–145)

## 2021-05-04 PROCEDURE — 83036 HEMOGLOBIN GLYCOSYLATED A1C: CPT | Performed by: NURSE PRACTITIONER

## 2021-05-04 PROCEDURE — 80053 COMPREHEN METABOLIC PANEL: CPT | Performed by: NURSE PRACTITIONER

## 2021-05-04 PROCEDURE — 99213 OFFICE O/P EST LOW 20 MIN: CPT | Performed by: NURSE PRACTITIONER

## 2021-05-04 PROCEDURE — 36415 COLL VENOUS BLD VENIPUNCTURE: CPT | Performed by: NURSE PRACTITIONER

## 2021-05-04 RX ORDER — METHOCARBAMOL 750 MG/1
750 TABLET, FILM COATED ORAL 4 TIMES DAILY PRN
COMMUNITY
End: 2021-06-22

## 2021-05-04 RX ORDER — TIZANIDINE 4 MG/1
4 TABLET ORAL NIGHTLY PRN
COMMUNITY
End: 2021-05-04

## 2021-05-04 NOTE — PATIENT INSTRUCTIONS
Labs today will call with results.   Cont to monitor BS at home, call with problems,   Reviewed PM notes. Refer to ortho will call with appt.   Patient requesting repeat UDS, denied other drug uses. Advised cannot refilled medications if failed drug screen and seeing other specialists such as PM, stated he does not want PM consult, he would like to discuss options with ortho.   Advised tylenol arthritis OTC as needed for pain, cautioned use of muscle relaxers, advised to take only 1 as needed at night for spasms, educated about sedation, do not drive while taking.   Patient agrees with plan of care and understands instructions. Call if worsening symptoms or any problems or concerns.

## 2021-05-04 NOTE — PROGRESS NOTES
"Chief Complaint  Back Pain (go over meds) and Diabetes    Subjective     {Problem List  Visit Diagnosis   Encounters  Notes  Medications  Labs  Result Review Imaging  Media :23}     Go Cho presents to [facility]  History of Present Illness  Here today for f/u, with chronic back pain, sees PM Dr. Kelsey, taking norco as needed for pain, discussed lumbar Medial branch blocks for the left side L4-S1 region with PM as well as possible radio frequency ablation.  he failed drug screen and did not have medications prescribed by PM, he declined interventional therapies. He would like ortho consult to discuss possible surgery today.   With DM,. taking farxiga 5mg daily, amaryl 2mg daily, metformin 1000mg in am, 500mg in pm, trulicity weekly, last a1c 6.9 1/2021. He does check BS at home, usually 120s.       Objective   Vital Signs:   /70 (BP Location: Left arm, Patient Position: Sitting, Cuff Size: Large Adult)   Pulse 94   Temp 97.5 °F (36.4 °C) (Infrared)   Ht 177.8 cm (70\")   Wt 118 kg (260 lb)   SpO2 97%   BMI 37.31 kg/m²     Physical Exam  Vitals and nursing note reviewed.   Constitutional:       Appearance: He is well-developed.   HENT:      Head: Normocephalic.   Eyes:      Pupils: Pupils are equal, round, and reactive to light.   Cardiovascular:      Rate and Rhythm: Normal rate and regular rhythm.      Heart sounds: Normal heart sounds.   Pulmonary:      Effort: Pulmonary effort is normal.      Breath sounds: Normal breath sounds.   Musculoskeletal:      Lumbar back: No tenderness. Decreased range of motion.   Skin:     General: Skin is warm and dry.   Neurological:      Mental Status: He is alert and oriented to person, place, and time.   Psychiatric:         Behavior: Behavior normal.         Judgment: Judgment normal.        Result Review :     CMP    CMP 7/9/20 10/8/20 1/29/21   Glucose 96 149 (A) 178 (A)   BUN 11 12 15   Creatinine 0.70 (A) 0.71 (A) " 0.65 (A)   eGFR  Am 141 139 >150   Sodium 137 133 (A) 136   Potassium 4.5 4.2 4.7   Chloride 99 97 (A) 95 (A)   Calcium 9.8 9.5 9.9   Albumin 4.70 4.50 4.70   Total Bilirubin 0.4 0.3 0.3   Alkaline Phosphatase 69 78 80   AST (SGOT) 34 29 21   ALT (SGPT) 38 35 24   (A) Abnormal value            Most Recent A1C    HGBA1C Most Recent 1/29/21   Hemoglobin A1C 6.90 (A)   (A) Abnormal value                      Assessment and Plan    Diagnoses and all orders for this visit:    1. Type 2 diabetes mellitus with hyperglycemia, unspecified whether long term insulin use (CMS/[facility]) (Primary)  -     Comprehensive Metabolic Panel  -     Hemoglobin A1c    2. Chronic left-sided low back pain without sciatica  -     Ambulatory Referral to Orthopedic Surgery    3. Neck pain  -     Ambulatory Referral to Orthopedic Surgery    4. Medication monitoring encounter  -     Compliance Drug Analysis, Ur - Urine, Clean Catch        Follow Up   Return if symptoms worsen or fail to improve.  Patient was given instructions and counseling regarding his condition or for health maintenance advice. Please see specific information pulled into the AVS if appropriate.     Labs today will call with results.   Cont to monitor BS at home, call with problems,   Reviewed PM notes. Refer to ortho will call with appt.   Patient requesting repeat UDS, denied other drug uses. Advised cannot refilled medications if failed drug screen and seeing other specialists such as PM, stated he does not want PM consult, he would like to discuss options with ortho.   Advised tylenol arthritis OTC as needed for pain, cautioned use of muscle relaxers, advised to take only 1 as needed at night for spasms, educated about sedation, do not drive while taking.   Patient agrees with plan of care and understands instructions. Call if worsening symptoms or any problems or concerns.

## 2021-05-09 LAB — DRUGS UR: NORMAL

## 2021-05-11 ENCOUNTER — TELEPHONE (OUTPATIENT)
Dept: FAMILY MEDICINE CLINIC | Facility: CLINIC | Age: 56
End: 2021-05-11

## 2021-05-12 ENCOUNTER — TELEPHONE (OUTPATIENT)
Dept: FAMILY MEDICINE CLINIC | Facility: CLINIC | Age: 56
End: 2021-05-12

## 2021-05-12 NOTE — TELEPHONE ENCOUNTER
Caller: Go Cho    Relationship to patient: Self    Best call back number: [phone]    Patient is needing: PATIENT IS CALLING STATED HE WAS SUPPOSED TO RECEIVE  A NEW PRESCRIPTION FOR LISINOPRIL 20 MG. PATIENT IS CURRENTLY ON lisinopril (PRINIVIL,ZESTRIL) 10 MG tablet . STATED HE HAS BEEN TAKING 2X A DAY BUT WILL RUN OUT SOON. PATIENT ALSO STATED  IS NOT THE ONE WHO PRESCRIBED THIS MEDICATION. PLEASE ADVISE.

## 2021-05-13 RX ORDER — LISINOPRIL 20 MG/1
20 TABLET ORAL DAILY
Qty: 90 TABLET | Refills: 1 | Status: SHIPPED | OUTPATIENT
Start: 2021-05-13

## 2021-05-13 RX ORDER — LISINOPRIL 20 MG/1
20 TABLET ORAL DAILY
COMMUNITY
End: 2021-05-13 | Stop reason: SDUPTHER

## 2021-05-17 RX ORDER — DULAGLUTIDE 0.75 MG/.5ML
0.5 INJECTION, SOLUTION SUBCUTANEOUS WEEKLY
Qty: 2 ML | Refills: 5 | Status: SHIPPED | OUTPATIENT
Start: 2021-05-17 | End: 2021-05-24 | Stop reason: SDUPTHER

## 2021-05-24 ENCOUNTER — OFFICE VISIT (OUTPATIENT)
Dept: ORTHOPEDIC SURGERY | Facility: CLINIC | Age: 56
End: 2021-05-24

## 2021-05-24 ENCOUNTER — TELEPHONE (OUTPATIENT)
Dept: ORTHOPEDIC SURGERY | Facility: CLINIC | Age: 56
End: 2021-05-24

## 2021-05-24 VITALS — TEMPERATURE: 97.7 F | BODY MASS INDEX: 37.22 KG/M2 | HEIGHT: 70 IN | WEIGHT: 260 LBS

## 2021-05-24 DIAGNOSIS — M17.10 ARTHRITIS OF KNEE: ICD-10-CM

## 2021-05-24 DIAGNOSIS — M25.561 PAIN IN BOTH KNEES, UNSPECIFIED CHRONICITY: Primary | ICD-10-CM

## 2021-05-24 DIAGNOSIS — M25.562 PAIN IN BOTH KNEES, UNSPECIFIED CHRONICITY: Primary | ICD-10-CM

## 2021-05-24 PROCEDURE — 99204 OFFICE O/P NEW MOD 45 MIN: CPT | Performed by: ORTHOPAEDIC SURGERY

## 2021-05-24 PROCEDURE — 73562 X-RAY EXAM OF KNEE 3: CPT | Performed by: ORTHOPAEDIC SURGERY

## 2021-05-24 RX ORDER — MELOXICAM 15 MG/1
15 TABLET ORAL DAILY PRN
Qty: 30 TABLET | Refills: 2 | Status: SHIPPED | OUTPATIENT
Start: 2021-05-24 | End: 2021-06-22

## 2021-05-24 RX ORDER — DULAGLUTIDE 0.75 MG/.5ML
0.5 INJECTION, SOLUTION SUBCUTANEOUS WEEKLY
Qty: 2 ML | Refills: 5 | Status: SHIPPED | OUTPATIENT
Start: 2021-05-24 | End: 2021-06-11 | Stop reason: SDUPTHER

## 2021-05-24 RX ORDER — TRAMADOL HYDROCHLORIDE 50 MG/1
50 TABLET ORAL EVERY 4 HOURS PRN
Qty: 60 TABLET | Refills: 0 | Status: SHIPPED | OUTPATIENT
Start: 2021-05-24 | End: 2021-06-22

## 2021-05-25 NOTE — PROGRESS NOTES
Patient: Go Cho    YOB: 1965    Medical Record Number: [id number]    Chief Complaints: Bilateral knee pain    History of Present Illness:     56 y.o. male patient who presents for evaluation of bilateral knee pain.  He reports a long history of problems dating back a number of years.  He has previously been treated by another physician.  Current pain is moderate, 7 out of 10.  He describes the pain as throbbing.  Localizes the pain mostly to the front of his knees.  He does report associated swelling and giving way.  Symptoms are worse when rising from a seated position, walking, climbing stairs and at night.  He has had a couple of injections in the past which did not provide significant relief.  Denies mechanical catching or locking.    Allergies: No Known Allergies    Home Medications:      Current Outpatient Medications:   •  albuterol (PROVENTIL) (2.5 MG/3ML) 0.083% nebulizer solution, Take 2.5 mg by nebulization Every 4 (Four) Hours As Needed for wheezing., Disp: 120 vial, Rfl: 12  •  amLODIPine (NORVASC) 5 MG tablet, TAKE 1 TABLET BY MOUTH EVERY DAY (Patient taking differently: 2 (two) times a day.), Disp: 30 tablet, Rfl: 5  •  aspirin 81 MG EC tablet, Take 81 mg by mouth Daily., Disp: , Rfl:   •  atorvastatin (LIPITOR) 10 MG tablet, TAKE 1 TABLET BY MOUTH DAILY, Disp: 90 tablet, Rfl: 1  •  azelastine (ASTELIN) 0.1 % nasal spray, 2 sprays into the nostril(s) as directed by provider 2 (Two) Times a Day. Use in each nostril as directed, Disp: 30 mL, Rfl: 11  •  Farxiga 5 MG tablet tablet, TAKE 1 TABLET BY MOUTH DAILY, Disp: 30 tablet, Rfl: 1  •  glimepiride (AMARYL) 2 MG tablet, Take 1 tablet by mouth Every Morning Before Breakfast., Disp: 30 tablet, Rfl: 5  •  glucose blood test strip, Test once daily, Disp: 30 each, Rfl: 12  •  HYDROcodone-acetaminophen (NORCO) 5-325 MG per tablet, Take 1 tablet by mouth Every 6 (Six) Hours As Needed (Orthopedic pain)., Disp: 60 tablet, Rfl: 0  •   Insulin Pen Needle (PEN NEEDLES) 31G X 5 MM misc, 0.25 mg 1 (One) Time Per Week., Disp: 12 each, Rfl: 3  •  Lancets (FREESTYLE) lancets, Test twice daily, Disp: 100 each, Rfl: 12  •  lisinopril (PRINIVIL,ZESTRIL) 20 MG tablet, Take 1 tablet by mouth Daily., Disp: 90 tablet, Rfl: 1  •  metFORMIN ER (GLUCOPHAGE-XR) 500 MG 24 hr tablet, Take 1 tablet by mouth 3 (Three) Times a Day., Disp: 270 tablet, Rfl: 1  •  methocarbamol (ROBAXIN) 750 MG tablet, Take 750 mg by mouth 4 (Four) Times a Day As Needed for Muscle Spasms., Disp: , Rfl:   •  mupirocin (Bactroban) 2 % ointment, Apply  topically to the appropriate area as directed 3 (Three) Times a Day., Disp: 22 g, Rfl: 0  •  NON FORMULARY, BX4 colon cleanse supplement, Disp: , Rfl:   •  omeprazole (priLOSEC) 40 MG capsule, Take 1 capsule by mouth Daily., Disp: 30 capsule, Rfl: 5  •  QUEtiapine (SEROquel) 50 MG tablet, TAKE 2 TABLETS BY MOUTH EVERY NIGHT, Disp: 180 tablet, Rfl: 3  •  Respiratory Therapy Supplies (NEBULIZER/TUBING/MOUTHPIECE) kit, ONE DAILY, Disp: 30 each, Rfl: 11  •  sildenafil (VIAGRA) 100 MG tablet, Take 1 tablet by mouth Daily As Needed for erectile dysfunction., Disp: 6 tablet, Rfl: 11  •  Dulaglutide (Trulicity) 0.75 MG/0.5ML solution pen-injector, Inject 0.5 mg under the skin into the appropriate area as directed 1 (One) Time Per Week., Disp: 2 mL, Rfl: 5  •  meloxicam (MOBIC) 15 MG tablet, Take 1 tablet by mouth Daily As Needed for Moderate Pain . Take as directed with food., Disp: 30 tablet, Rfl: 2  •  traMADol (ULTRAM) 50 MG tablet, Take 1 tablet by mouth Every 4 (Four) Hours As Needed for Moderate Pain ., Disp: 60 tablet, Rfl: 0    Past Medical History:   Diagnosis Date   • Diabetes mellitus (CMS/HCC)    • Hyperlipidemia    • Hypertension    • Sleep apnea        Past Surgical History:   Procedure Laterality Date   • COLONOSCOPY  02/01/2016    Howie Greer MD   • UPPER GASTROINTESTINAL ENDOSCOPY  08/07/2015    Howie Greer MD       Social  "History     Occupational History   • Not on file   Tobacco Use   • Smoking status: Current Every Day Smoker     Packs/day: 0.25     Years: 25.00     Pack years: 6.25   • Smokeless tobacco: Never Used   • Tobacco comment:  cont to wean  on6-8 cigs q  using vapor aid also   Substance and Sexual Activity   • Alcohol use: Yes     Comment: social   • Drug use: No   • Sexual activity: Not on file      Social History     Social History Narrative   • Not on file       Family History   Problem Relation Age of Onset   • No Known Problems Mother    • Heart disease Father        Review of Systems:      Constitutional: Denies fever, shaking or chills   Eyes: Denies change in visual acuity   HEENT: Denies nasal congestion or sore throat   Respiratory: Denies cough or shortness of breath   Cardiovascular: Denies chest pain or edema  Endocrine: Denies tremors, palpitations, intolerance of heat or cold, polyuria, polydipsia.  GI: Denies abdominal pain, nausea, vomiting, bloody stools or diarrhea  : Denies frequency, urgency, incontinence, retention, or nocturia.  Musculoskeletal: Denies numbness, tingling or loss of motor function except as above  Integument: Denies rash, lesion or ulceration   Neurologic: Denies headache or focal weakness, deficits  Heme: Denies spontaneous or excessive bleeding, epistaxis, hematuria, melena, fatigue, enlarged or tender lymph nodes.      All other pertinent positives and negatives as noted above in HPI.    Physical Exam: 56 y.o. male    Vitals:    05/24/21 1411   Temp: 97.7 °F (36.5 °C)   Weight: 118 kg (260 lb)   Height: 177.8 cm (70\")       General:  Patient is awake and alert.  Appears in no acute distress or discomfort.    Psych:  Affect and demeanor are appropriate.    Eyes:  Conjunctiva and sclera appear grossly normal.  Eyes track well and EOM seem to be intact.    Ears:  No gross abnormalities.  Hearing adequate for the exam.    Cardiovascular:  Regular rate and rhythm.    Lungs:  Good " "chest expansion.  Breathing unlabored.    Lymph:  No palpable masses or adenopathy in the affected extremity    Extremities: Both knees are examined and his exam is fairly symmetric.  Gait is benign.  No atrophy, swelling or masses.  Mild tenderness anteriorly over the patellofemoral retinaculum and patellar origin at the distal pole the patella.  No significant tibiofemoral joint line tenderness on either side.  He does not have an effusion.  Knee motion is symmetric.  He has full extension but no recurvatum.  Flexion is to about 120 degrees largely limited by his body habitus.  Hamstrings are tight bilaterally.  No instability.  Negative medial and lateral Alicia's test.  Normal motor and sensory function in his legs and feet.  Brisk capillary refill.         Radiology:   Bilateral AP, merchant's and lateral views of the knees are ordered and reviewed to evaluate his complaint.  No comparison films are immediately available.  He appears to have mild medial and patellofemoral compartment osteoarthritis with joint space narrowing and early osteophyte formation.  He has multiple enthesophytes noted bilaterally.    Assessment/Plan: Bilateral knee osteoarthritis and patellar tendinopathy    We discussed options for him.  He tells me he was told in the past that he has \"bone-on-bone\".  I told him I do not see any evidence for that on his x-rays today.  He has some degree of arthritis but it does not appear to be severe.  I think his symptoms are due to a combination of the arthritis and active irritation of his patellar tendon bilaterally.  We discussed options for him at this point.  He has heard of the gel injections and was very interested in trying that option.  I will see if we can get that approved for him and I will see him back for purposes of the injections in the future.  The risk, benefits and alternatives were discussed, particularly his elevated risk due to diabetes.  I do think he could benefit from " physical therapy.  I plan to refer him to therapy after the upcoming injections.  He did request something to help with his pain in the interim.  I agreed to give him a limited prescription for meloxicam and tramadol to take as needed.  Risks of these medicines were discussed.    Clarence Ko MD    05/24/2021    CC to Namrata Duran, WARREN

## 2021-06-07 RX ORDER — AMLODIPINE BESYLATE 5 MG/1
5 TABLET ORAL DAILY
Qty: 30 TABLET | Refills: 5 | Status: SHIPPED | OUTPATIENT
Start: 2021-06-07 | End: 2021-06-10

## 2021-06-09 ENCOUNTER — CLINICAL SUPPORT (OUTPATIENT)
Dept: ORTHOPEDIC SURGERY | Facility: CLINIC | Age: 56
End: 2021-06-09

## 2021-06-09 VITALS — HEIGHT: 70 IN | BODY MASS INDEX: 37.22 KG/M2 | TEMPERATURE: 97.1 F | WEIGHT: 260 LBS

## 2021-06-09 DIAGNOSIS — M17.10 ARTHRITIS OF KNEE: Primary | ICD-10-CM

## 2021-06-09 DIAGNOSIS — M25.562 PAIN IN BOTH KNEES, UNSPECIFIED CHRONICITY: ICD-10-CM

## 2021-06-09 DIAGNOSIS — M25.561 PAIN IN BOTH KNEES, UNSPECIFIED CHRONICITY: ICD-10-CM

## 2021-06-09 PROCEDURE — 20610 DRAIN/INJ JOINT/BURSA W/O US: CPT | Performed by: ORTHOPAEDIC SURGERY

## 2021-06-09 RX ORDER — LIDOCAINE HYDROCHLORIDE 20 MG/ML
2 INJECTION, SOLUTION EPIDURAL; INFILTRATION; INTRACAUDAL; PERINEURAL
Status: COMPLETED | OUTPATIENT
Start: 2021-06-09 | End: 2021-06-09

## 2021-06-09 RX ADMIN — LIDOCAINE HYDROCHLORIDE 2 ML: 20 INJECTION, SOLUTION EPIDURAL; INFILTRATION; INTRACAUDAL; PERINEURAL at 11:42

## 2021-06-09 NOTE — PROGRESS NOTES
Mr. Cho follows up today for his knees.  He comes in for the purpose of the Visco injections.  Denies any changes.  The risk, benefits and alternatives to the Visco injections were discussed.  He consented and the injections were performed as described below.  He will follow-up as needed.    Large Joint Arthrocentesis: R knee  Date/Time: 6/9/2021 11:42 AM  Consent given by: patient  Site marked: site marked  Timeout: Immediately prior to procedure a time out was called to verify the correct patient, procedure, equipment, support staff and site/side marked as required   Supporting Documentation  Indications: pain and joint swelling   Procedure Details  Location: knee - R knee  Preparation: Patient was prepped and draped in the usual sterile fashion  Needle size: 25 G  Approach: anterolateral  Medications administered: 2 mL lidocaine PF 2% 2 %; 88 mg Hyaluronan 88 MG/4ML  Patient tolerance: patient tolerated the procedure well with no immediate complications    Large Joint Arthrocentesis: L knee  Date/Time: 6/9/2021 11:42 AM  Consent given by: patient  Site marked: site marked  Timeout: Immediately prior to procedure a time out was called to verify the correct patient, procedure, equipment, support staff and site/side marked as required   Supporting Documentation  Indications: pain and joint swelling   Procedure Details  Location: knee - L knee  Preparation: Patient was prepped and draped in the usual sterile fashion  Needle size: 25 G  Approach: anterolateral  Medications administered: 2 mL lidocaine PF 2% 2 %; 88 mg Hyaluronan 88 MG/4ML  Patient tolerance: patient tolerated the procedure well with no immediate complications

## 2021-06-10 ENCOUNTER — TELEPHONE (OUTPATIENT)
Dept: FAMILY MEDICINE CLINIC | Facility: CLINIC | Age: 56
End: 2021-06-10

## 2021-06-10 RX ORDER — AMLODIPINE BESYLATE 10 MG/1
10 TABLET ORAL DAILY
Qty: 30 TABLET | Refills: 6 | Status: SHIPPED | OUTPATIENT
Start: 2021-06-10 | End: 2022-05-02

## 2021-06-10 NOTE — TELEPHONE ENCOUNTER
Caller: Go Cho    Relationship: Self    Best call back number: [phone] (M)    Medication needed:   Dulaglutide (Trulicity) 0.75 MG/0.5ML solution pen-injector    When do you need the refill by: ASAP    What additional details did the patient provide when requesting the medication: PATIENT CALLED TO REQUEST A MEDICATION REFILL ON HIS MEDICATION. PATIENT STATES THAT HE IS COMPLETELY OUT OF MEDICATION. PATIENT STATES THAT HE BRIEFLY SPOKE WITH RAYMOND MCKAY ABOUT TAKING 2 PILLS PER DAY OF THE amLODIPine (NORVASC) 5 MG tablet, PATEINT STATES THAT WHEN HE PICKED UP HIS PRESCRIPTION IT IS ONLY ENOUGH PILLS FOR 1 PER DAY.      PLEASE CONTACT PATIENT TO ADVISE ABOUT THE DOSAGE OF amLODIPine (NORVASC) 5 MG tablet    Does the patient have less than a 3 day supply:  [] Yes  [] No    What is the patient's preferred pharmacy:      [id number] [address] AT [facility] [phone] [facility] [phone]

## 2021-06-10 NOTE — TELEPHONE ENCOUNTER
Trulicity was already sent in a couple of weeks ago with refills. I sent in new rx for amlodipine 10mg 1 tab daily.

## 2021-06-11 RX ORDER — DULAGLUTIDE 0.75 MG/.5ML
0.5 INJECTION, SOLUTION SUBCUTANEOUS WEEKLY
Qty: 2 ML | Refills: 5 | Status: SHIPPED | OUTPATIENT
Start: 2021-06-11 | End: 2021-07-06 | Stop reason: SDUPTHER

## 2021-06-11 NOTE — TELEPHONE ENCOUNTER
I called [facility] they said did not receive the trulicity so they need a new prescription for that. They did receive the amlodipine # 30 with 6 refills

## 2021-06-22 ENCOUNTER — OFFICE VISIT (OUTPATIENT)
Dept: SPORTS MEDICINE | Facility: CLINIC | Age: 56
End: 2021-06-22

## 2021-06-22 VITALS
WEIGHT: 265 LBS | TEMPERATURE: 98.7 F | OXYGEN SATURATION: 98 % | SYSTOLIC BLOOD PRESSURE: 146 MMHG | BODY MASS INDEX: 37.94 KG/M2 | HEIGHT: 70 IN | HEART RATE: 91 BPM | DIASTOLIC BLOOD PRESSURE: 80 MMHG

## 2021-06-22 DIAGNOSIS — M51.36 DDD (DEGENERATIVE DISC DISEASE), LUMBAR: Primary | ICD-10-CM

## 2021-06-22 PROCEDURE — 99214 OFFICE O/P EST MOD 30 MIN: CPT | Performed by: FAMILY MEDICINE

## 2021-06-22 RX ORDER — LISINOPRIL 10 MG/1
TABLET ORAL
COMMUNITY
Start: 2021-06-07

## 2021-06-22 RX ORDER — BACLOFEN 10 MG/1
10 TABLET ORAL 3 TIMES DAILY PRN
Qty: 30 TABLET | Refills: 1 | Status: SHIPPED | OUTPATIENT
Start: 2021-06-22

## 2021-06-22 RX ORDER — CELECOXIB 200 MG/1
200 CAPSULE ORAL DAILY
Qty: 30 CAPSULE | Refills: 2 | Status: SHIPPED | OUTPATIENT
Start: 2021-06-22

## 2021-06-22 NOTE — PROGRESS NOTES
"Go is a 56 y.o. year old male     Chief Complaint   Patient presents with   • Back Pain     low back - radiates down to hips sometimes - L side - stepped into hole x month ago - brought Xray from Sept-Oct 2020        History of Present Illness  HPI   Here today for concern of chronic low back pain.  He states that he has been having pain in his back for years, treated in the past with epidurals and physical therapy without much benefit.  Describes constant pain that is moderately severe.  This is worsened by caring for his daughter who had a brain injury and requires 24-hour care.  He is currently using tramadol and Robaxin without much relief.  Denies any radicular pain at the moment.    Review of Systems    /80   Pulse 91   Temp 98.7 °F (37.1 °C)   Ht 177.8 cm (70\")   Wt 120 kg (265 lb)   SpO2 98%   BMI 38.02 kg/m²      Physical Exam    Vital signs reviewed.   General: No acute distress.      MSK Exam:  Ortho Exam  Lumbar spine has diffuse tenderness in the midline and paraspinal muscles bilaterally.  Range of motion is limited secondary to pain and subjective stiffness.  No gross limitations.  No radicular signs.    I reviewed x-ray images the patient brought with him today of AP and lateral lumbar spine taken 10/8/2020.  These show multilevel degenerative changes with some facet arthrosis.      Diagnoses and all orders for this visit:    DDD (degenerative disc disease), lumbar  -     MRI Lumbar Spine Without Contrast; Future  -     celecoxib (CeleBREX) 200 MG capsule; Take 1 capsule by mouth Daily.  -     baclofen (LIORESAL) 10 MG tablet; Take 1 tablet by mouth 3 (Three) Times a Day As Needed for Muscle Spasms.    Other orders  -     lisinopril (PRINIVIL,ZESTRIL) 10 MG tablet    I think her neck step is to get a formal MRI of his back, this appears mostly facet mediated pain.  We will likely need to consult with pain management to consider facet blocks next.  We will try adding Celebrex and " baclofen for pain relief.  Discussed weight loss, balance of healthy exercise, etc.

## 2021-07-06 RX ORDER — DULAGLUTIDE 0.75 MG/.5ML
0.75 INJECTION, SOLUTION SUBCUTANEOUS WEEKLY
Qty: 0.5 ML | Refills: 6 | Status: SHIPPED | OUTPATIENT
Start: 2021-07-06 | End: 2022-02-01 | Stop reason: SDUPTHER

## 2021-07-08 RX ORDER — DAPAGLIFLOZIN 5 MG/1
5 TABLET, FILM COATED ORAL DAILY
Qty: 30 TABLET | Refills: 1 | Status: SHIPPED | OUTPATIENT
Start: 2021-07-08 | End: 2021-09-07

## 2021-07-27 ENCOUNTER — HOSPITAL ENCOUNTER (OUTPATIENT)
Dept: MRI IMAGING | Facility: HOSPITAL | Age: 56
Discharge: HOME | End: 2021-07-27
Admitting: FAMILY MEDICINE

## 2021-07-27 DIAGNOSIS — M51.36 DDD (DEGENERATIVE DISC DISEASE), LUMBAR: ICD-10-CM

## 2021-07-27 PROCEDURE — 72148 MRI LUMBAR SPINE W/O DYE: CPT

## 2021-08-03 DIAGNOSIS — M51.36 DDD (DEGENERATIVE DISC DISEASE), LUMBAR: Primary | ICD-10-CM

## 2021-08-03 DIAGNOSIS — M47.816 LUMBAR FACET ARTHROPATHY: ICD-10-CM

## 2021-08-06 RX ORDER — ATORVASTATIN CALCIUM 10 MG/1
10 TABLET, FILM COATED ORAL DAILY
Qty: 90 TABLET | Refills: 1 | Status: SHIPPED | OUTPATIENT
Start: 2021-08-06 | End: 2021-11-29 | Stop reason: SDUPTHER

## 2021-08-06 RX ORDER — GLIMEPIRIDE 2 MG/1
2 TABLET ORAL
Qty: 30 TABLET | Refills: 5 | Status: SHIPPED | OUTPATIENT
Start: 2021-08-06 | End: 2021-09-07

## 2021-08-18 ENCOUNTER — OFFICE VISIT (OUTPATIENT)
Dept: PAIN MEDICINE | Facility: CLINIC | Age: 56
End: 2021-08-18

## 2021-08-18 VITALS
RESPIRATION RATE: 16 BRPM | SYSTOLIC BLOOD PRESSURE: 159 MMHG | TEMPERATURE: 97.1 F | HEIGHT: 70 IN | HEART RATE: 86 BPM | WEIGHT: 273.6 LBS | DIASTOLIC BLOOD PRESSURE: 92 MMHG | OXYGEN SATURATION: 97 % | BODY MASS INDEX: 39.17 KG/M2

## 2021-08-18 DIAGNOSIS — M51.36 DDD (DEGENERATIVE DISC DISEASE), LUMBAR: ICD-10-CM

## 2021-08-18 DIAGNOSIS — M51.36 BULGE OF LUMBAR DISC WITHOUT MYELOPATHY: ICD-10-CM

## 2021-08-18 DIAGNOSIS — M47.816 LUMBAR FACET JOINT SYNDROME: Primary | ICD-10-CM

## 2021-08-18 DIAGNOSIS — G89.29 CHRONIC BILATERAL LOW BACK PAIN WITHOUT SCIATICA: ICD-10-CM

## 2021-08-18 DIAGNOSIS — M54.50 CHRONIC BILATERAL LOW BACK PAIN WITHOUT SCIATICA: ICD-10-CM

## 2021-08-18 PROCEDURE — 99214 OFFICE O/P EST MOD 30 MIN: CPT | Performed by: NURSE PRACTITIONER
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
## 2021-09-07 RX ORDER — DAPAGLIFLOZIN 5 MG/1
5 TABLET, FILM COATED ORAL DAILY
Qty: 30 TABLET | Refills: 1 | Status: SHIPPED | OUTPATIENT
Start: 2021-09-07 | End: 2021-09-07

## 2021-09-07 RX ORDER — OMEPRAZOLE 40 MG/1
40 CAPSULE, DELAYED RELEASE ORAL DAILY
Qty: 30 CAPSULE | Refills: 5 | Status: CANCELLED | OUTPATIENT
Start: 2021-09-07

## 2021-09-07 RX ORDER — GLIMEPIRIDE 2 MG/1
2 TABLET ORAL
Qty: 30 TABLET | Refills: 5 | Status: SHIPPED | OUTPATIENT
Start: 2021-09-07

## 2021-09-07 RX ORDER — DAPAGLIFLOZIN 5 MG/1
5 TABLET, FILM COATED ORAL DAILY
Qty: 30 TABLET | Refills: 1 | Status: SHIPPED | OUTPATIENT
Start: 2021-09-07 | End: 2021-11-02 | Stop reason: SDUPTHER

## 2021-09-07 RX ORDER — DAPAGLIFLOZIN 5 MG/1
5 TABLET, FILM COATED ORAL DAILY
Qty: 30 TABLET | Refills: 1 | Status: CANCELLED | OUTPATIENT
Start: 2021-09-07

## 2021-09-30 RX ORDER — AZELASTINE 1 MG/ML
2 SPRAY, METERED NASAL 2 TIMES DAILY
Qty: 30 ML | Refills: 11 | Status: SHIPPED | OUTPATIENT
Start: 2021-09-30

## 2021-10-07 RX ORDER — METFORMIN HYDROCHLORIDE 500 MG/1
500 TABLET, EXTENDED RELEASE ORAL 3 TIMES DAILY
Qty: 270 TABLET | Refills: 1 | Status: SHIPPED | OUTPATIENT
Start: 2021-10-07 | End: 2022-03-25

## 2021-10-07 RX ORDER — QUETIAPINE FUMARATE 50 MG/1
100 TABLET, FILM COATED ORAL NIGHTLY
Qty: 180 TABLET | Refills: 3 | Status: SHIPPED | OUTPATIENT
Start: 2021-10-07 | End: 2022-09-21

## 2021-11-02 RX ORDER — DAPAGLIFLOZIN 5 MG/1
5 TABLET, FILM COATED ORAL DAILY
Qty: 30 TABLET | Refills: 1 | Status: SHIPPED | OUTPATIENT
Start: 2021-11-02 | End: 2021-12-29

## 2021-11-29 RX ORDER — ATORVASTATIN CALCIUM 10 MG/1
10 TABLET, FILM COATED ORAL DAILY
Qty: 90 TABLET | Refills: 1 | Status: SHIPPED | OUTPATIENT
Start: 2021-11-29

## 2021-12-29 RX ORDER — DAPAGLIFLOZIN 5 MG/1
5 TABLET, FILM COATED ORAL DAILY
Qty: 30 TABLET | Refills: 1 | Status: SHIPPED | OUTPATIENT
Start: 2021-12-29 | End: 2022-02-24

## 2022-02-01 RX ORDER — DULAGLUTIDE 0.75 MG/.5ML
0.75 INJECTION, SOLUTION SUBCUTANEOUS WEEKLY
Qty: 0.5 ML | Refills: 3 | Status: SHIPPED | OUTPATIENT
Start: 2022-02-01 | End: 2022-07-11

## 2022-02-24 RX ORDER — DAPAGLIFLOZIN 5 MG/1
5 TABLET, FILM COATED ORAL DAILY
Qty: 30 TABLET | Refills: 1 | Status: SHIPPED | OUTPATIENT
Start: 2022-02-24

## 2022-03-25 RX ORDER — METFORMIN HYDROCHLORIDE 500 MG/1
TABLET, EXTENDED RELEASE ORAL
Qty: 270 TABLET | Refills: 0 | Status: SHIPPED | OUTPATIENT
Start: 2022-03-25 | End: 2022-06-23

## 2022-05-02 RX ORDER — AMLODIPINE BESYLATE 10 MG/1
10 TABLET ORAL DAILY
Qty: 30 TABLET | Refills: 6 | Status: SHIPPED | OUTPATIENT
Start: 2022-05-02 | End: 2022-11-25

## 2022-06-23 RX ORDER — METFORMIN HYDROCHLORIDE 500 MG/1
TABLET, EXTENDED RELEASE ORAL
Qty: 90 TABLET | Refills: 0 | Status: SHIPPED | OUTPATIENT
Start: 2022-06-23 | End: 2022-07-27 | Stop reason: SDUPTHER

## 2022-07-11 RX ORDER — DULAGLUTIDE 0.75 MG/.5ML
INJECTION, SOLUTION SUBCUTANEOUS
Qty: 2 ML | Refills: 0 | Status: SHIPPED | OUTPATIENT
Start: 2022-07-11

## 2022-07-25 RX ORDER — METFORMIN HYDROCHLORIDE 500 MG/1
500 TABLET, EXTENDED RELEASE ORAL 3 TIMES DAILY
Qty: 90 TABLET | Refills: 0 | OUTPATIENT
Start: 2022-07-25

## 2022-07-27 RX ORDER — METFORMIN HYDROCHLORIDE 500 MG/1
500 TABLET, EXTENDED RELEASE ORAL 3 TIMES DAILY
Qty: 30 TABLET | Refills: 0 | Status: SHIPPED | OUTPATIENT
Start: 2022-07-27 | End: 2022-08-22

## 2022-08-22 RX ORDER — METFORMIN HYDROCHLORIDE 500 MG/1
TABLET, EXTENDED RELEASE ORAL
Qty: 30 TABLET | Refills: 0 | Status: SHIPPED | OUTPATIENT
Start: 2022-08-22

## 2022-09-21 RX ORDER — QUETIAPINE FUMARATE 50 MG/1
100 TABLET, FILM COATED ORAL NIGHTLY
Qty: 180 TABLET | Refills: 3 | Status: SHIPPED | OUTPATIENT
Start: 2022-09-21

## 2022-11-25 RX ORDER — AMLODIPINE BESYLATE 10 MG/1
10 TABLET ORAL DAILY
Qty: 30 TABLET | Refills: 6 | Status: SHIPPED | OUTPATIENT
Start: 2022-11-25

## 2023-04-10 ENCOUNTER — TRANSCRIBE ORDERS (OUTPATIENT)
Dept: ADMINISTRATIVE | Facility: HOSPITAL | Age: 58
End: 2023-04-10
Payer: COMMERCIAL

## 2023-04-10 DIAGNOSIS — M54.50 LOW BACK PAIN, UNSPECIFIED BACK PAIN LATERALITY, UNSPECIFIED CHRONICITY, UNSPECIFIED WHETHER SCIATICA PRESENT: Primary | ICD-10-CM

## 2023-04-23 ENCOUNTER — HOSPITAL ENCOUNTER (OUTPATIENT)
Dept: MRI IMAGING | Facility: HOSPITAL | Age: 58
Discharge: HOME | End: 2023-04-23
Admitting: EMERGENCY MEDICINE
Payer: COMMERCIAL

## 2023-04-23 DIAGNOSIS — M54.50 LOW BACK PAIN, UNSPECIFIED BACK PAIN LATERALITY, UNSPECIFIED CHRONICITY, UNSPECIFIED WHETHER SCIATICA PRESENT: ICD-10-CM

## 2023-04-23 PROCEDURE — 72148 MRI LUMBAR SPINE W/O DYE: CPT

## 2024-07-03 ENCOUNTER — APPOINTMENT (OUTPATIENT)
Dept: CT IMAGING | Facility: HOSPITAL | Age: 59
DRG: 603 | End: 2024-07-03
Payer: COMMERCIAL

## 2024-07-03 ENCOUNTER — ANESTHESIA EVENT (OUTPATIENT)
Dept: PERIOP | Facility: HOSPITAL | Age: 59
End: 2024-07-03
Payer: COMMERCIAL

## 2024-07-03 ENCOUNTER — APPOINTMENT (OUTPATIENT)
Dept: GENERAL RADIOLOGY | Facility: HOSPITAL | Age: 59
DRG: 603 | End: 2024-07-03
Payer: COMMERCIAL

## 2024-07-03 ENCOUNTER — ANESTHESIA (OUTPATIENT)
Dept: PERIOP | Facility: HOSPITAL | Age: 59
End: 2024-07-03
Payer: COMMERCIAL

## 2024-07-03 ENCOUNTER — HOSPITAL ENCOUNTER (INPATIENT)
Facility: HOSPITAL | Age: 59
LOS: 2 days | Discharge: HOME | DRG: 603 | End: 2024-07-05
Attending: EMERGENCY MEDICINE | Admitting: INTERNAL MEDICINE
Payer: COMMERCIAL

## 2024-07-03 ENCOUNTER — APPOINTMENT (OUTPATIENT)
Dept: CARDIOLOGY | Facility: HOSPITAL | Age: 59
DRG: 603 | End: 2024-07-03
Payer: COMMERCIAL

## 2024-07-03 DIAGNOSIS — E83.42 HYPOMAGNESEMIA: ICD-10-CM

## 2024-07-03 DIAGNOSIS — R79.89 TROPONIN LEVEL ELEVATED: ICD-10-CM

## 2024-07-03 DIAGNOSIS — L03.818 CELLULITIS OF OTHER SPECIFIED SITE: ICD-10-CM

## 2024-07-03 DIAGNOSIS — L02.214 SOFT TISSUE ABSCESS OF INGUINAL REGION: ICD-10-CM

## 2024-07-03 DIAGNOSIS — R60.0 PERIPHERAL EDEMA: Primary | ICD-10-CM

## 2024-07-03 DIAGNOSIS — E87.6 HYPOKALEMIA: ICD-10-CM

## 2024-07-03 LAB
ALBUMIN SERPL-MCNC: 4.2 G/DL (ref 3.5–5.2)
ALBUMIN/GLOB SERPL: 1 G/DL
ALP SERPL-CCNC: 86 U/L (ref 39–117)
ALT SERPL W P-5'-P-CCNC: 29 U/L (ref 1–41)
ANION GAP SERPL CALCULATED.3IONS-SCNC: 12.2 MMOL/L (ref 5–15)
AST SERPL-CCNC: 23 U/L (ref 1–40)
BASOPHILS # BLD AUTO: 0.06 10*3/MM3 (ref 0–0.2)
BASOPHILS NFR BLD AUTO: 0.4 % (ref 0–1.5)
BH CV LOW VAS LEFT GREATER SAPH BK VESSEL: 1
BH CV LOW VAS RIGHT GREATER SAPH AK VESSEL: 1
BH CV LOW VAS RIGHT GREATER SAPH BK VESSEL: 1
BH CV LOWER VASCULAR LEFT COMMON FEMORAL AUGMENT: NORMAL
BH CV LOWER VASCULAR LEFT COMMON FEMORAL COMPETENT: NORMAL
BH CV LOWER VASCULAR LEFT COMMON FEMORAL COMPRESS: NORMAL
BH CV LOWER VASCULAR LEFT COMMON FEMORAL PHASIC: NORMAL
BH CV LOWER VASCULAR LEFT COMMON FEMORAL SPONT: NORMAL
BH CV LOWER VASCULAR LEFT DISTAL FEMORAL COMPRESS: NORMAL
BH CV LOWER VASCULAR LEFT GASTRONEMIUS COMPRESS: NORMAL
BH CV LOWER VASCULAR LEFT GREATER SAPH AK COMPRESS: NORMAL
BH CV LOWER VASCULAR LEFT GREATER SAPH BK COMPRESS: NORMAL
BH CV LOWER VASCULAR LEFT GREATER SAPH BK THROMBUS: NORMAL
BH CV LOWER VASCULAR LEFT LESSER SAPH COMPRESS: NORMAL
BH CV LOWER VASCULAR LEFT MID FEMORAL AUGMENT: NORMAL
BH CV LOWER VASCULAR LEFT MID FEMORAL COMPETENT: NORMAL
BH CV LOWER VASCULAR LEFT MID FEMORAL COMPRESS: NORMAL
BH CV LOWER VASCULAR LEFT MID FEMORAL PHASIC: NORMAL
BH CV LOWER VASCULAR LEFT MID FEMORAL SPONT: NORMAL
BH CV LOWER VASCULAR LEFT PERONEAL COMPRESS: NORMAL
BH CV LOWER VASCULAR LEFT POPLITEAL AUGMENT: NORMAL
BH CV LOWER VASCULAR LEFT POPLITEAL COMPETENT: NORMAL
BH CV LOWER VASCULAR LEFT POPLITEAL COMPRESS: NORMAL
BH CV LOWER VASCULAR LEFT POPLITEAL PHASIC: NORMAL
BH CV LOWER VASCULAR LEFT POPLITEAL SPONT: NORMAL
BH CV LOWER VASCULAR LEFT POSTERIOR TIBIAL COMPRESS: NORMAL
BH CV LOWER VASCULAR LEFT PROFUNDA FEMORAL COMPRESS: NORMAL
BH CV LOWER VASCULAR LEFT PROXIMAL FEMORAL COMPRESS: NORMAL
BH CV LOWER VASCULAR LEFT SAPHENOFEMORAL JUNCTION COMPRESS: NORMAL
BH CV LOWER VASCULAR LEFT SOLEAL COMPRESS: NORMAL
BH CV LOWER VASCULAR RIGHT COMMON FEMORAL AUGMENT: NORMAL
BH CV LOWER VASCULAR RIGHT COMMON FEMORAL COMPETENT: NORMAL
BH CV LOWER VASCULAR RIGHT COMMON FEMORAL COMPRESS: NORMAL
BH CV LOWER VASCULAR RIGHT COMMON FEMORAL PHASIC: NORMAL
BH CV LOWER VASCULAR RIGHT COMMON FEMORAL SPONT: NORMAL
BH CV LOWER VASCULAR RIGHT DISTAL FEMORAL COMPRESS: NORMAL
BH CV LOWER VASCULAR RIGHT GASTRONEMIUS COMPRESS: NORMAL
BH CV LOWER VASCULAR RIGHT GREATER SAPH AK COMPRESS: NORMAL
BH CV LOWER VASCULAR RIGHT GREATER SAPH AK THROMBUS: NORMAL
BH CV LOWER VASCULAR RIGHT GREATER SAPH BK COMPRESS: NORMAL
BH CV LOWER VASCULAR RIGHT GREATER SAPH BK THROMBUS: NORMAL
BH CV LOWER VASCULAR RIGHT LESSER SAPH COMPRESS: NORMAL
BH CV LOWER VASCULAR RIGHT MID FEMORAL AUGMENT: NORMAL
BH CV LOWER VASCULAR RIGHT MID FEMORAL COMPETENT: NORMAL
BH CV LOWER VASCULAR RIGHT MID FEMORAL COMPRESS: NORMAL
BH CV LOWER VASCULAR RIGHT MID FEMORAL PHASIC: NORMAL
BH CV LOWER VASCULAR RIGHT MID FEMORAL SPONT: NORMAL
BH CV LOWER VASCULAR RIGHT PERONEAL COMPRESS: NORMAL
BH CV LOWER VASCULAR RIGHT POPLITEAL AUGMENT: NORMAL
BH CV LOWER VASCULAR RIGHT POPLITEAL COMPETENT: NORMAL
BH CV LOWER VASCULAR RIGHT POPLITEAL COMPRESS: NORMAL
BH CV LOWER VASCULAR RIGHT POPLITEAL PHASIC: NORMAL
BH CV LOWER VASCULAR RIGHT POPLITEAL SPONT: NORMAL
BH CV LOWER VASCULAR RIGHT POSTERIOR TIBIAL COMPRESS: NORMAL
BH CV LOWER VASCULAR RIGHT PROFUNDA FEMORAL COMPRESS: NORMAL
BH CV LOWER VASCULAR RIGHT PROXIMAL FEMORAL COMPRESS: NORMAL
BH CV LOWER VASCULAR RIGHT SAPHENOFEMORAL JUNCTION COMPRESS: NORMAL
BH CV LOWER VASCULAR RIGHT SOLEAL COMPRESS: NORMAL
BILIRUB SERPL-MCNC: 0.6 MG/DL (ref 0–1.2)
BILIRUB UR QL STRIP: NEGATIVE
BUN SERPL-MCNC: 7 MG/DL (ref 8–23)
BUN/CREAT SERPL: 9.6 (ref 7–25)
CALCIUM SPEC-SCNC: 9.9 MG/DL (ref 8.6–10.5)
CHLORIDE SERPL-SCNC: 91 MMOL/L (ref 98–107)
CLARITY UR: CLEAR
CO2 SERPL-SCNC: 30.8 MMOL/L (ref 22–29)
COLOR UR: YELLOW
CREAT SERPL-MCNC: 0.73 MG/DL (ref 0.76–1.27)
D-LACTATE SERPL-SCNC: 1.5 MMOL/L (ref 0.5–2)
DEPRECATED RDW RBC AUTO: 40.4 FL (ref 37–54)
EGFRCR SERPLBLD CKD-EPI 2021: 104.2 ML/MIN/1.73
EOSINOPHIL # BLD AUTO: 0.12 10*3/MM3 (ref 0–0.4)
EOSINOPHIL NFR BLD AUTO: 0.9 % (ref 0.3–6.2)
ERYTHROCYTE [DISTWIDTH] IN BLOOD BY AUTOMATED COUNT: 12.4 % (ref 12.3–15.4)
GEN 5 2HR TROPONIN T REFLEX: 31 NG/L
GLOBULIN UR ELPH-MCNC: 4.3 GM/DL
GLUCOSE BLDC GLUCOMTR-MCNC: 126 MG/DL (ref 70–130)
GLUCOSE BLDC GLUCOMTR-MCNC: 90 MG/DL (ref 70–130)
GLUCOSE BLDC GLUCOMTR-MCNC: 96 MG/DL (ref 70–130)
GLUCOSE BLDC GLUCOMTR-MCNC: 97 MG/DL (ref 70–130)
GLUCOSE SERPL-MCNC: 105 MG/DL (ref 65–99)
GLUCOSE UR STRIP-MCNC: ABNORMAL MG/DL
HCT VFR BLD AUTO: 42.4 % (ref 37.5–51)
HGB BLD-MCNC: 14.1 G/DL (ref 13–17.7)
HGB UR QL STRIP.AUTO: NEGATIVE
IMM GRANULOCYTES # BLD AUTO: 0.07 10*3/MM3 (ref 0–0.05)
IMM GRANULOCYTES NFR BLD AUTO: 0.5 % (ref 0–0.5)
INR PPP: 1.15 (ref 0.9–1.1)
KETONES UR QL STRIP: NEGATIVE
LEUKOCYTE ESTERASE UR QL STRIP.AUTO: NEGATIVE
LIPASE SERPL-CCNC: 32 U/L (ref 13–60)
LYMPHOCYTES # BLD AUTO: 1.52 10*3/MM3 (ref 0.7–3.1)
LYMPHOCYTES NFR BLD AUTO: 11.2 % (ref 19.6–45.3)
MAGNESIUM SERPL-MCNC: 1.5 MG/DL (ref 1.6–2.4)
MCH RBC QN AUTO: 30.1 PG (ref 26.6–33)
MCHC RBC AUTO-ENTMCNC: 33.3 G/DL (ref 31.5–35.7)
MCV RBC AUTO: 90.6 FL (ref 79–97)
MONOCYTES # BLD AUTO: 0.92 10*3/MM3 (ref 0.1–0.9)
MONOCYTES NFR BLD AUTO: 6.8 % (ref 5–12)
NEUTROPHILS NFR BLD AUTO: 10.92 10*3/MM3 (ref 1.7–7)
NEUTROPHILS NFR BLD AUTO: 80.2 % (ref 42.7–76)
NITRITE UR QL STRIP: NEGATIVE
NRBC BLD AUTO-RTO: 0 /100 WBC (ref 0–0.2)
NT-PROBNP SERPL-MCNC: 186 PG/ML (ref 0–900)
PH UR STRIP.AUTO: 6.5 [PH] (ref 5–8)
PLATELET # BLD AUTO: 318 10*3/MM3 (ref 140–450)
PMV BLD AUTO: 9 FL (ref 6–12)
POTASSIUM SERPL-SCNC: 3.3 MMOL/L (ref 3.5–5.2)
PROCALCITONIN SERPL-MCNC: 0.12 NG/ML (ref 0–0.25)
PROT SERPL-MCNC: 8.5 G/DL (ref 6–8.5)
PROT UR QL STRIP: NEGATIVE
PROTHROMBIN TIME: 14.9 SECONDS (ref 11.7–14.2)
QT INTERVAL: 347 MS
QTC INTERVAL: 425 MS
RBC # BLD AUTO: 4.68 10*6/MM3 (ref 4.14–5.8)
SODIUM SERPL-SCNC: 134 MMOL/L (ref 136–145)
SP GR UR STRIP: 1.02 (ref 1–1.03)
TROPONIN T DELTA: -10 NG/L
TROPONIN T SERPL HS-MCNC: 41 NG/L
UROBILINOGEN UR QL STRIP: ABNORMAL
WBC NRBC COR # BLD AUTO: 13.61 10*3/MM3 (ref 3.4–10.8)

## 2024-07-03 PROCEDURE — 93970 EXTREMITY STUDY: CPT

## 2024-07-03 PROCEDURE — 87186 SC STD MICRODIL/AGAR DIL: CPT | Performed by: SURGERY

## 2024-07-03 PROCEDURE — 0J980ZZ DRAINAGE OF ABDOMEN SUBCUTANEOUS TISSUE AND FASCIA, OPEN APPROACH: ICD-10-PCS | Performed by: SURGERY

## 2024-07-03 PROCEDURE — 36415 COLL VENOUS BLD VENIPUNCTURE: CPT

## 2024-07-03 PROCEDURE — 25010000002 DEXAMETHASONE SODIUM PHOSPHATE 20 MG/5ML SOLUTION: Performed by: STUDENT IN AN ORGANIZED HEALTH CARE EDUCATION/TRAINING PROGRAM

## 2024-07-03 PROCEDURE — 84145 PROCALCITONIN (PCT): CPT | Performed by: EMERGENCY MEDICINE

## 2024-07-03 PROCEDURE — 87040 BLOOD CULTURE FOR BACTERIA: CPT | Performed by: EMERGENCY MEDICINE

## 2024-07-03 PROCEDURE — 87075 CULTR BACTERIA EXCEPT BLOOD: CPT | Performed by: SURGERY

## 2024-07-03 PROCEDURE — 81003 URINALYSIS AUTO W/O SCOPE: CPT | Performed by: EMERGENCY MEDICINE

## 2024-07-03 PROCEDURE — 93005 ELECTROCARDIOGRAM TRACING: CPT | Performed by: EMERGENCY MEDICINE

## 2024-07-03 PROCEDURE — 25010000002 FUROSEMIDE PER 20 MG: Performed by: INTERNAL MEDICINE

## 2024-07-03 PROCEDURE — 87205 SMEAR GRAM STAIN: CPT | Performed by: SURGERY

## 2024-07-03 PROCEDURE — 96375 TX/PRO/DX INJ NEW DRUG ADDON: CPT

## 2024-07-03 PROCEDURE — 93970 EXTREMITY STUDY: CPT | Performed by: SURGERY

## 2024-07-03 PROCEDURE — 83735 ASSAY OF MAGNESIUM: CPT | Performed by: EMERGENCY MEDICINE

## 2024-07-03 PROCEDURE — 25510000001 IOPAMIDOL 61 % SOLUTION: Performed by: EMERGENCY MEDICINE

## 2024-07-03 PROCEDURE — 74177 CT ABD & PELVIS W/CONTRAST: CPT

## 2024-07-03 PROCEDURE — 87076 CULTURE ANAEROBE IDENT EACH: CPT | Performed by: SURGERY

## 2024-07-03 PROCEDURE — 25010000002 HYDROMORPHONE PER 4 MG: Performed by: EMERGENCY MEDICINE

## 2024-07-03 PROCEDURE — 83690 ASSAY OF LIPASE: CPT | Performed by: EMERGENCY MEDICINE

## 2024-07-03 PROCEDURE — 25010000002 PROPOFOL 200 MG/20ML EMULSION: Performed by: STUDENT IN AN ORGANIZED HEALTH CARE EDUCATION/TRAINING PROGRAM

## 2024-07-03 PROCEDURE — 25010000002 MAGNESIUM SULFATE 2 GM/50ML SOLUTION: Performed by: EMERGENCY MEDICINE

## 2024-07-03 PROCEDURE — 10061 I&D ABSCESS COMP/MULTIPLE: CPT | Performed by: SURGERY

## 2024-07-03 PROCEDURE — 85025 COMPLETE CBC W/AUTO DIFF WBC: CPT | Performed by: EMERGENCY MEDICINE

## 2024-07-03 PROCEDURE — 87070 CULTURE OTHR SPECIMN AEROBIC: CPT | Performed by: SURGERY

## 2024-07-03 PROCEDURE — 25810000003 LACTATED RINGERS PER 1000 ML: Performed by: ANESTHESIOLOGY

## 2024-07-03 PROCEDURE — 87077 CULTURE AEROBIC IDENTIFY: CPT | Performed by: SURGERY

## 2024-07-03 PROCEDURE — 87040 BLOOD CULTURE FOR BACTERIA: CPT | Performed by: INTERNAL MEDICINE

## 2024-07-03 PROCEDURE — 99285 EMERGENCY DEPT VISIT HI MDM: CPT

## 2024-07-03 PROCEDURE — 71045 X-RAY EXAM CHEST 1 VIEW: CPT

## 2024-07-03 PROCEDURE — 25010000002 POTASSIUM CHLORIDE 10 MEQ/100ML SOLUTION: Performed by: INTERNAL MEDICINE

## 2024-07-03 PROCEDURE — 93010 ELECTROCARDIOGRAM REPORT: CPT | Performed by: INTERNAL MEDICINE

## 2024-07-03 PROCEDURE — 25010000002 VANCOMYCIN 10 G RECONSTITUTED SOLUTION: Performed by: INTERNAL MEDICINE

## 2024-07-03 PROCEDURE — 25010000002 PIPERACILLIN SOD-TAZOBACTAM PER 1 G: Performed by: EMERGENCY MEDICINE

## 2024-07-03 PROCEDURE — 80053 COMPREHEN METABOLIC PANEL: CPT | Performed by: EMERGENCY MEDICINE

## 2024-07-03 PROCEDURE — 87015 SPECIMEN INFECT AGNT CONCNTJ: CPT | Performed by: SURGERY

## 2024-07-03 PROCEDURE — 25810000003 SODIUM CHLORIDE 0.9 % SOLUTION: Performed by: INTERNAL MEDICINE

## 2024-07-03 PROCEDURE — 25010000002 GLYCOPYRROLATE 0.2 MG/ML SOLUTION: Performed by: ANESTHESIOLOGY

## 2024-07-03 PROCEDURE — 84484 ASSAY OF TROPONIN QUANT: CPT | Performed by: EMERGENCY MEDICINE

## 2024-07-03 PROCEDURE — 25010000002 ONDANSETRON PER 1 MG: Performed by: EMERGENCY MEDICINE

## 2024-07-03 PROCEDURE — 25010000002 ONDANSETRON PER 1 MG: Performed by: STUDENT IN AN ORGANIZED HEALTH CARE EDUCATION/TRAINING PROGRAM

## 2024-07-03 PROCEDURE — 83605 ASSAY OF LACTIC ACID: CPT | Performed by: EMERGENCY MEDICINE

## 2024-07-03 PROCEDURE — 85610 PROTHROMBIN TIME: CPT | Performed by: EMERGENCY MEDICINE

## 2024-07-03 PROCEDURE — 83880 ASSAY OF NATRIURETIC PEPTIDE: CPT | Performed by: EMERGENCY MEDICINE

## 2024-07-03 PROCEDURE — 25010000002 MIDAZOLAM PER 1 MG: Performed by: ANESTHESIOLOGY

## 2024-07-03 PROCEDURE — 96365 THER/PROPH/DIAG IV INF INIT: CPT

## 2024-07-03 PROCEDURE — 82948 REAGENT STRIP/BLOOD GLUCOSE: CPT

## 2024-07-03 PROCEDURE — 25010000002 PIPERACILLIN SOD-TAZOBACTAM PER 1 G: Performed by: SURGERY

## 2024-07-03 RX ORDER — NITROGLYCERIN 0.4 MG/1
0.4 TABLET SUBLINGUAL
Status: DISCONTINUED | OUTPATIENT
Start: 2024-07-03 | End: 2024-07-05 | Stop reason: HOSPADM

## 2024-07-03 RX ORDER — ERGOCALCIFEROL 1.25 MG/1
1 CAPSULE ORAL WEEKLY
COMMUNITY
Start: 2024-04-25

## 2024-07-03 RX ORDER — NICOTINE POLACRILEX 4 MG
15 LOZENGE BUCCAL
Status: DISCONTINUED | OUTPATIENT
Start: 2024-07-03 | End: 2024-07-05 | Stop reason: HOSPADM

## 2024-07-03 RX ORDER — QUETIAPINE FUMARATE 100 MG/1
100 TABLET, FILM COATED ORAL NIGHTLY
Status: DISCONTINUED | OUTPATIENT
Start: 2024-07-03 | End: 2024-07-05 | Stop reason: HOSPADM

## 2024-07-03 RX ORDER — PROMETHAZINE HYDROCHLORIDE 25 MG/1
25 SUPPOSITORY RECTAL ONCE AS NEEDED
Status: DISCONTINUED | OUTPATIENT
Start: 2024-07-03 | End: 2024-07-03 | Stop reason: HOSPADM

## 2024-07-03 RX ORDER — IBUPROFEN 600 MG/1
1 TABLET ORAL
Status: DISCONTINUED | OUTPATIENT
Start: 2024-07-03 | End: 2024-07-05 | Stop reason: HOSPADM

## 2024-07-03 RX ORDER — PROPOFOL 10 MG/ML
INJECTION, EMULSION INTRAVENOUS AS NEEDED
Status: DISCONTINUED | OUTPATIENT
Start: 2024-07-03 | End: 2024-07-03 | Stop reason: SURG

## 2024-07-03 RX ORDER — DROPERIDOL 2.5 MG/ML
0.62 INJECTION, SOLUTION INTRAMUSCULAR; INTRAVENOUS
Status: DISCONTINUED | OUTPATIENT
Start: 2024-07-03 | End: 2024-07-03 | Stop reason: HOSPADM

## 2024-07-03 RX ORDER — DEXAMETHASONE SODIUM PHOSPHATE 4 MG/ML
INJECTION, SOLUTION INTRA-ARTICULAR; INTRALESIONAL; INTRAMUSCULAR; INTRAVENOUS; SOFT TISSUE AS NEEDED
Status: DISCONTINUED | OUTPATIENT
Start: 2024-07-03 | End: 2024-07-03 | Stop reason: SURG

## 2024-07-03 RX ORDER — AMOXICILLIN 250 MG
2 CAPSULE ORAL 2 TIMES DAILY
Status: DISCONTINUED | OUTPATIENT
Start: 2024-07-03 | End: 2024-07-05 | Stop reason: HOSPADM

## 2024-07-03 RX ORDER — NICOTINE 21 MG/24HR
1 PATCH, TRANSDERMAL 24 HOURS TRANSDERMAL EVERY 24 HOURS
Status: DISCONTINUED | OUTPATIENT
Start: 2024-07-03 | End: 2024-07-05 | Stop reason: HOSPADM

## 2024-07-03 RX ORDER — SODIUM CHLORIDE 0.9 % (FLUSH) 0.9 %
10 SYRINGE (ML) INJECTION EVERY 12 HOURS SCHEDULED
Status: DISCONTINUED | OUTPATIENT
Start: 2024-07-03 | End: 2024-07-05 | Stop reason: HOSPADM

## 2024-07-03 RX ORDER — HYDROCODONE BITARTRATE AND ACETAMINOPHEN 5; 325 MG/1; MG/1
1 TABLET ORAL ONCE AS NEEDED
Status: DISCONTINUED | OUTPATIENT
Start: 2024-07-03 | End: 2024-07-03 | Stop reason: HOSPADM

## 2024-07-03 RX ORDER — BISACODYL 10 MG
10 SUPPOSITORY, RECTAL RECTAL DAILY PRN
Status: DISCONTINUED | OUTPATIENT
Start: 2024-07-03 | End: 2024-07-05 | Stop reason: HOSPADM

## 2024-07-03 RX ORDER — EPHEDRINE SULFATE 50 MG/ML
5 INJECTION, SOLUTION INTRAVENOUS ONCE AS NEEDED
Status: DISCONTINUED | OUTPATIENT
Start: 2024-07-03 | End: 2024-07-03 | Stop reason: HOSPADM

## 2024-07-03 RX ORDER — SODIUM CHLORIDE 0.9 % (FLUSH) 0.9 %
10 SYRINGE (ML) INJECTION AS NEEDED
Status: DISCONTINUED | OUTPATIENT
Start: 2024-07-03 | End: 2024-07-05 | Stop reason: HOSPADM

## 2024-07-03 RX ORDER — FAMOTIDINE 10 MG/ML
20 INJECTION, SOLUTION INTRAVENOUS ONCE
Status: COMPLETED | OUTPATIENT
Start: 2024-07-03 | End: 2024-07-03

## 2024-07-03 RX ORDER — FLUMAZENIL 0.1 MG/ML
0.2 INJECTION INTRAVENOUS AS NEEDED
Status: DISCONTINUED | OUTPATIENT
Start: 2024-07-03 | End: 2024-07-03 | Stop reason: HOSPADM

## 2024-07-03 RX ORDER — INSULIN LISPRO 100 [IU]/ML
2-7 INJECTION, SOLUTION INTRAVENOUS; SUBCUTANEOUS
Status: DISCONTINUED | OUTPATIENT
Start: 2024-07-03 | End: 2024-07-04

## 2024-07-03 RX ORDER — FENTANYL CITRATE 50 UG/ML
50 INJECTION, SOLUTION INTRAMUSCULAR; INTRAVENOUS
Status: DISCONTINUED | OUTPATIENT
Start: 2024-07-03 | End: 2024-07-03 | Stop reason: HOSPADM

## 2024-07-03 RX ORDER — LIDOCAINE HYDROCHLORIDE 20 MG/ML
INJECTION, SOLUTION INFILTRATION; PERINEURAL AS NEEDED
Status: DISCONTINUED | OUTPATIENT
Start: 2024-07-03 | End: 2024-07-03 | Stop reason: SURG

## 2024-07-03 RX ORDER — DEXTROSE MONOHYDRATE 25 G/50ML
25 INJECTION, SOLUTION INTRAVENOUS
Status: DISCONTINUED | OUTPATIENT
Start: 2024-07-03 | End: 2024-07-05 | Stop reason: HOSPADM

## 2024-07-03 RX ORDER — SODIUM CHLORIDE 0.9 % (FLUSH) 0.9 %
3-10 SYRINGE (ML) INJECTION AS NEEDED
Status: DISCONTINUED | OUTPATIENT
Start: 2024-07-03 | End: 2024-07-03 | Stop reason: HOSPADM

## 2024-07-03 RX ORDER — HYDROCODONE BITARTRATE AND ACETAMINOPHEN 7.5; 325 MG/1; MG/1
1 TABLET ORAL EVERY 4 HOURS PRN
Status: DISCONTINUED | OUTPATIENT
Start: 2024-07-03 | End: 2024-07-05 | Stop reason: HOSPADM

## 2024-07-03 RX ORDER — SODIUM CHLORIDE 0.9 % (FLUSH) 0.9 %
3 SYRINGE (ML) INJECTION EVERY 12 HOURS SCHEDULED
Status: DISCONTINUED | OUTPATIENT
Start: 2024-07-03 | End: 2024-07-03 | Stop reason: HOSPADM

## 2024-07-03 RX ORDER — ASPIRIN 81 MG/1
81 TABLET ORAL DAILY
Status: DISCONTINUED | OUTPATIENT
Start: 2024-07-03 | End: 2024-07-05 | Stop reason: HOSPADM

## 2024-07-03 RX ORDER — GLYCOPYRROLATE 0.2 MG/ML
0.2 INJECTION INTRAMUSCULAR; INTRAVENOUS
Status: COMPLETED | OUTPATIENT
Start: 2024-07-03 | End: 2024-07-03

## 2024-07-03 RX ORDER — HYDROCODONE BITARTRATE AND ACETAMINOPHEN 7.5; 325 MG/1; MG/1
1 TABLET ORAL EVERY 6 HOURS PRN
COMMUNITY

## 2024-07-03 RX ORDER — MIDAZOLAM HYDROCHLORIDE 1 MG/ML
1 INJECTION INTRAMUSCULAR; INTRAVENOUS
Status: DISCONTINUED | OUTPATIENT
Start: 2024-07-03 | End: 2024-07-03 | Stop reason: HOSPADM

## 2024-07-03 RX ORDER — LIDOCAINE HYDROCHLORIDE 10 MG/ML
0.5 INJECTION, SOLUTION INFILTRATION; PERINEURAL ONCE AS NEEDED
Status: DISCONTINUED | OUTPATIENT
Start: 2024-07-03 | End: 2024-07-03 | Stop reason: HOSPADM

## 2024-07-03 RX ORDER — POLYETHYLENE GLYCOL 3350 17 G/17G
17 POWDER, FOR SOLUTION ORAL DAILY PRN
Status: DISCONTINUED | OUTPATIENT
Start: 2024-07-03 | End: 2024-07-05 | Stop reason: HOSPADM

## 2024-07-03 RX ORDER — SODIUM CHLORIDE 9 MG/ML
40 INJECTION, SOLUTION INTRAVENOUS AS NEEDED
Status: DISCONTINUED | OUTPATIENT
Start: 2024-07-03 | End: 2024-07-05 | Stop reason: HOSPADM

## 2024-07-03 RX ORDER — HYDROMORPHONE HYDROCHLORIDE 1 MG/ML
0.5 INJECTION, SOLUTION INTRAMUSCULAR; INTRAVENOUS; SUBCUTANEOUS ONCE
Status: COMPLETED | OUTPATIENT
Start: 2024-07-03 | End: 2024-07-03

## 2024-07-03 RX ORDER — OMEPRAZOLE 20 MG/1
20 CAPSULE, DELAYED RELEASE ORAL DAILY
COMMUNITY

## 2024-07-03 RX ORDER — NALOXONE HCL 0.4 MG/ML
0.2 VIAL (ML) INJECTION AS NEEDED
Status: DISCONTINUED | OUTPATIENT
Start: 2024-07-03 | End: 2024-07-03 | Stop reason: HOSPADM

## 2024-07-03 RX ORDER — PROMETHAZINE HYDROCHLORIDE 25 MG/1
25 TABLET ORAL ONCE AS NEEDED
Status: DISCONTINUED | OUTPATIENT
Start: 2024-07-03 | End: 2024-07-03 | Stop reason: HOSPADM

## 2024-07-03 RX ORDER — SODIUM CHLORIDE, SODIUM LACTATE, POTASSIUM CHLORIDE, CALCIUM CHLORIDE 600; 310; 30; 20 MG/100ML; MG/100ML; MG/100ML; MG/100ML
9 INJECTION, SOLUTION INTRAVENOUS CONTINUOUS
Status: DISCONTINUED | OUTPATIENT
Start: 2024-07-03 | End: 2024-07-03

## 2024-07-03 RX ORDER — PANTOPRAZOLE SODIUM 40 MG/1
40 TABLET, DELAYED RELEASE ORAL
Status: DISCONTINUED | OUTPATIENT
Start: 2024-07-04 | End: 2024-07-05 | Stop reason: HOSPADM

## 2024-07-03 RX ORDER — VIT C/B6/B5/MAGNESIUM/HERB 173 50-5-6-5MG
1000 CAPSULE ORAL DAILY
COMMUNITY
End: 2024-07-05 | Stop reason: HOSPADM

## 2024-07-03 RX ORDER — SUCCINYLCHOLINE/SOD CL,ISO/PF 200MG/10ML
SYRINGE (ML) INTRAVENOUS AS NEEDED
Status: DISCONTINUED | OUTPATIENT
Start: 2024-07-03 | End: 2024-07-03 | Stop reason: SURG

## 2024-07-03 RX ORDER — MAGNESIUM SULFATE HEPTAHYDRATE 40 MG/ML
2 INJECTION, SOLUTION INTRAVENOUS ONCE
Status: COMPLETED | OUTPATIENT
Start: 2024-07-03 | End: 2024-07-03

## 2024-07-03 RX ORDER — IPRATROPIUM BROMIDE AND ALBUTEROL SULFATE 2.5; .5 MG/3ML; MG/3ML
3 SOLUTION RESPIRATORY (INHALATION) ONCE AS NEEDED
Status: DISCONTINUED | OUTPATIENT
Start: 2024-07-03 | End: 2024-07-03 | Stop reason: HOSPADM

## 2024-07-03 RX ORDER — TRIAMCINOLONE ACETONIDE 1 MG/G
1 CREAM TOPICAL 2 TIMES DAILY
COMMUNITY
Start: 2024-06-27 | End: 2024-07-05 | Stop reason: HOSPADM

## 2024-07-03 RX ORDER — HYDROCODONE BITARTRATE AND ACETAMINOPHEN 7.5; 325 MG/1; MG/1
1 TABLET ORAL EVERY 6 HOURS PRN
Status: DISCONTINUED | OUTPATIENT
Start: 2024-07-03 | End: 2024-07-03

## 2024-07-03 RX ORDER — DIPHENHYDRAMINE HYDROCHLORIDE 50 MG/ML
12.5 INJECTION INTRAMUSCULAR; INTRAVENOUS
Status: DISCONTINUED | OUTPATIENT
Start: 2024-07-03 | End: 2024-07-03 | Stop reason: HOSPADM

## 2024-07-03 RX ORDER — HYDROCHLOROTHIAZIDE 12.5 MG/1
12.5 TABLET ORAL DAILY
COMMUNITY
Start: 2024-06-27 | End: 2024-07-05 | Stop reason: HOSPADM

## 2024-07-03 RX ORDER — ONDANSETRON 2 MG/ML
4 INJECTION INTRAMUSCULAR; INTRAVENOUS ONCE AS NEEDED
Status: DISCONTINUED | OUTPATIENT
Start: 2024-07-03 | End: 2024-07-03 | Stop reason: HOSPADM

## 2024-07-03 RX ORDER — HYDROMORPHONE HYDROCHLORIDE 1 MG/ML
0.5 INJECTION, SOLUTION INTRAMUSCULAR; INTRAVENOUS; SUBCUTANEOUS
Status: DISCONTINUED | OUTPATIENT
Start: 2024-07-03 | End: 2024-07-03 | Stop reason: HOSPADM

## 2024-07-03 RX ORDER — TESTOSTERONE 20.25 MG/1.25G
GEL TOPICAL DAILY
COMMUNITY
Start: 2024-06-06

## 2024-07-03 RX ORDER — MAGNESIUM HYDROXIDE 1200 MG/15ML
LIQUID ORAL AS NEEDED
Status: DISCONTINUED | OUTPATIENT
Start: 2024-07-03 | End: 2024-07-03 | Stop reason: HOSPADM

## 2024-07-03 RX ORDER — FUROSEMIDE 10 MG/ML
40 INJECTION INTRAMUSCULAR; INTRAVENOUS ONCE
Status: COMPLETED | OUTPATIENT
Start: 2024-07-03 | End: 2024-07-03

## 2024-07-03 RX ORDER — ONDANSETRON 2 MG/ML
4 INJECTION INTRAMUSCULAR; INTRAVENOUS ONCE
Status: COMPLETED | OUTPATIENT
Start: 2024-07-03 | End: 2024-07-03

## 2024-07-03 RX ORDER — DAPAGLIFLOZIN 10 MG/1
1 TABLET, FILM COATED ORAL DAILY
COMMUNITY

## 2024-07-03 RX ORDER — POTASSIUM CHLORIDE 7.45 MG/ML
10 INJECTION INTRAVENOUS
Status: COMPLETED | OUTPATIENT
Start: 2024-07-03 | End: 2024-07-03

## 2024-07-03 RX ORDER — OXYCODONE AND ACETAMINOPHEN 7.5; 325 MG/1; MG/1
1 TABLET ORAL EVERY 4 HOURS PRN
Status: DISCONTINUED | OUTPATIENT
Start: 2024-07-03 | End: 2024-07-03 | Stop reason: HOSPADM

## 2024-07-03 RX ORDER — FENTANYL CITRATE 50 UG/ML
50 INJECTION, SOLUTION INTRAMUSCULAR; INTRAVENOUS ONCE AS NEEDED
Status: DISCONTINUED | OUTPATIENT
Start: 2024-07-03 | End: 2024-07-03 | Stop reason: HOSPADM

## 2024-07-03 RX ORDER — LOSARTAN POTASSIUM 100 MG/1
100 TABLET ORAL DAILY
Status: DISCONTINUED | OUTPATIENT
Start: 2024-07-03 | End: 2024-07-05 | Stop reason: HOSPADM

## 2024-07-03 RX ORDER — LABETALOL HYDROCHLORIDE 5 MG/ML
5 INJECTION, SOLUTION INTRAVENOUS
Status: DISCONTINUED | OUTPATIENT
Start: 2024-07-03 | End: 2024-07-03 | Stop reason: HOSPADM

## 2024-07-03 RX ORDER — LOSARTAN POTASSIUM 100 MG/1
100 TABLET ORAL DAILY
COMMUNITY
Start: 2024-05-14

## 2024-07-03 RX ORDER — ONDANSETRON 2 MG/ML
4 INJECTION INTRAMUSCULAR; INTRAVENOUS EVERY 6 HOURS PRN
Status: DISCONTINUED | OUTPATIENT
Start: 2024-07-03 | End: 2024-07-05 | Stop reason: HOSPADM

## 2024-07-03 RX ORDER — BISACODYL 5 MG/1
5 TABLET, DELAYED RELEASE ORAL DAILY PRN
Status: DISCONTINUED | OUTPATIENT
Start: 2024-07-03 | End: 2024-07-05 | Stop reason: HOSPADM

## 2024-07-03 RX ORDER — ONDANSETRON 2 MG/ML
INJECTION INTRAMUSCULAR; INTRAVENOUS AS NEEDED
Status: DISCONTINUED | OUTPATIENT
Start: 2024-07-03 | End: 2024-07-03 | Stop reason: SURG

## 2024-07-03 RX ORDER — HYDRALAZINE HYDROCHLORIDE 20 MG/ML
5 INJECTION INTRAMUSCULAR; INTRAVENOUS
Status: DISCONTINUED | OUTPATIENT
Start: 2024-07-03 | End: 2024-07-03 | Stop reason: HOSPADM

## 2024-07-03 RX ADMIN — DEXAMETHASONE SODIUM PHOSPHATE 4 MG: 4 INJECTION, SOLUTION INTRAMUSCULAR; INTRAVENOUS at 18:33

## 2024-07-03 RX ADMIN — GLYCOPYRROLATE 0.2 MG: 0.2 INJECTION INTRAMUSCULAR; INTRAVENOUS at 18:04

## 2024-07-03 RX ADMIN — QUETIAPINE FUMARATE 100 MG: 100 TABLET ORAL at 21:05

## 2024-07-03 RX ADMIN — MAGNESIUM SULFATE HEPTAHYDRATE 2 G: 2 INJECTION, SOLUTION INTRAVENOUS at 17:20

## 2024-07-03 RX ADMIN — LIDOCAINE HYDROCHLORIDE 100 MG: 20 INJECTION, SOLUTION INFILTRATION; PERINEURAL at 18:29

## 2024-07-03 RX ADMIN — POTASSIUM CHLORIDE 10 MEQ: 7.46 INJECTION, SOLUTION INTRAVENOUS at 19:15

## 2024-07-03 RX ADMIN — PIPERACILLIN AND TAZOBACTAM 3.38 G: 3; .375 INJECTION, POWDER, FOR SOLUTION INTRAVENOUS at 14:02

## 2024-07-03 RX ADMIN — PIPERACILLIN AND TAZOBACTAM 3.38 G: 3; .375 INJECTION, POWDER, FOR SOLUTION INTRAVENOUS at 21:05

## 2024-07-03 RX ADMIN — IOPAMIDOL 85 ML: 612 INJECTION, SOLUTION INTRAVENOUS at 12:02

## 2024-07-03 RX ADMIN — FUROSEMIDE 40 MG: 10 INJECTION, SOLUTION INTRAMUSCULAR; INTRAVENOUS at 15:41

## 2024-07-03 RX ADMIN — Medication 10 ML: at 16:16

## 2024-07-03 RX ADMIN — POTASSIUM CHLORIDE 10 MEQ: 7.46 INJECTION, SOLUTION INTRAVENOUS at 20:25

## 2024-07-03 RX ADMIN — FAMOTIDINE 20 MG: 10 INJECTION INTRAVENOUS at 17:56

## 2024-07-03 RX ADMIN — PROPOFOL 200 MG: 10 INJECTION, EMULSION INTRAVENOUS at 18:29

## 2024-07-03 RX ADMIN — ONDANSETRON 4 MG: 2 INJECTION INTRAMUSCULAR; INTRAVENOUS at 18:33

## 2024-07-03 RX ADMIN — MIDAZOLAM 1 MG: 1 INJECTION INTRAMUSCULAR; INTRAVENOUS at 17:56

## 2024-07-03 RX ADMIN — VANCOMYCIN HYDROCHLORIDE 2250 MG: 10 INJECTION, POWDER, LYOPHILIZED, FOR SOLUTION INTRAVENOUS at 14:50

## 2024-07-03 RX ADMIN — ONDANSETRON 4 MG: 2 INJECTION INTRAMUSCULAR; INTRAVENOUS at 12:19

## 2024-07-03 RX ADMIN — SODIUM CHLORIDE, POTASSIUM CHLORIDE, SODIUM LACTATE AND CALCIUM CHLORIDE 9 ML/HR: 600; 310; 30; 20 INJECTION, SOLUTION INTRAVENOUS at 17:56

## 2024-07-03 RX ADMIN — HYDROCODONE BITARTRATE AND ACETAMINOPHEN 1 TABLET: 7.5; 325 TABLET ORAL at 15:42

## 2024-07-03 RX ADMIN — Medication 200 MG: at 18:29

## 2024-07-03 RX ADMIN — POTASSIUM CHLORIDE 10 MEQ: 7.46 INJECTION, SOLUTION INTRAVENOUS at 21:08

## 2024-07-03 RX ADMIN — HYDROMORPHONE HYDROCHLORIDE 0.5 MG: 1 INJECTION, SOLUTION INTRAMUSCULAR; INTRAVENOUS; SUBCUTANEOUS at 12:19

## 2024-07-03 RX ADMIN — NICOTINE 1 PATCH: 21 PATCH, EXTENDED RELEASE TRANSDERMAL at 21:04

## 2024-07-03 RX ADMIN — POTASSIUM CHLORIDE 10 MEQ: 7.46 INJECTION, SOLUTION INTRAVENOUS at 16:17

## 2024-07-03 NOTE — ANESTHESIA PREPROCEDURE EVALUATION
Anesthesia Evaluation     NPO Solid Status: Waived due to emergency             Airway   Mallampati: II  TM distance: >3 FB  Neck ROM: full  no difficulty expected  Dental - normal exam   (+) lower dentures    Pulmonary - normal exam   (+) ,sleep apnea  Cardiovascular - normal exam    (+) hypertension      Neuro/Psych  GI/Hepatic/Renal/Endo    (+) obesity, diabetes mellitus    Musculoskeletal     Abdominal    Substance History      OB/GYN          Other                    Anesthesia Plan    ASA 3 - emergent     general     (Farxiga - last dose this morning)    Anesthetic plan, risks, benefits, and alternatives have been provided, discussed and informed consent has been obtained with: patient.    CODE STATUS:    Code Status (Patient has no pulse and is not breathing): CPR (Attempt to Resuscitate)  Medical Interventions (Patient has pulse or is breathing): Full Support

## 2024-07-03 NOTE — CASE MANAGEMENT/SOCIAL WORK
Discharge Planning Assessment  [facility]     Patient Name: Go Cho  MRN: [id number]  Today's Date: 7/3/2024    Admit Date: 7/3/2024    Plan: Home, family to transport   Discharge Needs Assessment       Row Name 07/03/24 1558       Living Environment    People in Home alone    Current Living Arrangements home    Potentially Unsafe Housing Conditions none    In the past 12 months has the electric, gas, oil, or water company threatened to shut off services in your home? No    Primary Care Provided by self    Provides Primary Care For no one    Family Caregiver if Needed none    Quality of Family Relationships unable to assess    Able to Return to Prior Arrangements yes       Resource/Environmental Concerns    Resource/Environmental Concerns none    Transportation Concerns none       Transportation Needs    In the past 12 months, has lack of transportation kept you from medical appointments or from getting medications? no    In the past 12 months, has lack of transportation kept you from meetings, work, or from getting things needed for daily living? No       Food Insecurity    Within the past 12 months, you worried that your food would run out before you got the money to buy more. Never true    Within the past 12 months, the food you bought just didn't last and you didn't have money to get more. Never true       Transition Planning    Patient/Family Anticipates Transition to home    Patient/Family Anticipated Services at Transition none    Transportation Anticipated family or friend will provide       Discharge Needs Assessment    Equipment Currently Used at Home none    Concerns to be Addressed no discharge needs identified;denies needs/concerns at this time    Anticipated Changes Related to Illness none    Equipment Needed After Discharge none                   Discharge Plan       Row Name 07/03/24 1559       Plan    Plan Home, family to transport    Plan Comments Met  with pt at bedside. Permission  obtained to speak to pt in front of family members. Introduced self and explained role of . Face sheet verified, PCP is Priyanka Torres. Pt denies any difficulty paying for medications, and he obtains his medications from [employer]. Pt lives alone, stated that he has several family members that could assist with any care needs that may arise. Pt is independent in ADL's and he does not use, nor require any assistive devices. Pt continues to work full time with [employer]. Pt has never had home health or been to SNF/Rehab and he does not anticipate requiring those services at discharge. Upon discharge, pt stated that his family would transport home. Explained that CCP would follow to assess for discharge needs.                  Continued Care and Services - Admitted Since 7/3/2024    No active coordination exists for this encounter.       Expected Discharge Date and Time       Expected Discharge Date Expected Discharge Time    Jul 5, 2024            Demographic Summary    No documentation.                  Functional Status    No documentation.                  Psychosocial    No documentation.                  Abuse/Neglect    No documentation.                  Legal    No documentation.                  Substance Abuse    No documentation.                  Patient Forms    No documentation.                     April Pérez RN

## 2024-07-03 NOTE — PROGRESS NOTES
Clinical Pharmacy Services: Medication History    Go Cho is a 59 y.o. male presenting to [facility] for   Chief Complaint   Patient presents with    Foot Swelling    Groin Swelling       He  has a past medical history of Diabetes mellitus, Hyperlipidemia, Hypertension, and Sleep apnea.    Allergies as of 07/03/2024    (No Known Allergies)       Medication information was obtained from: Patient   Pharmacy and Phone Number:     Prior to Admission Medications       Prescriptions Last Dose Informant Patient Reported? Taking?    aspirin 81 MG EC tablet  Medication Bottle Yes Yes    Take 1 tablet by mouth Daily.    atorvastatin (LIPITOR) 10 MG tablet  Pharmacy No Yes    Take 1 tablet by mouth Daily.    Farxiga 10 MG tablet tablet  Pharmacy No Yes    TAKE 1 TABLET BY MOUTH DAILY    hydroCHLOROthiazide 12.5 MG tablet  Medication Bottle Yes Yes    Take 1 tablet by mouth Daily.    HYDROcodone-acetaminophen (NORCO) 7.5-325 MG per tablet  Pharmacy, Self Yes Yes    Take 1 tablet by mouth Every 6 (Six) Hours As Needed for Moderate Pain.    losartan (COZAAR) 100 MG tablet  Medication Bottle Yes Yes    Take 1 tablet by mouth Daily.    metFORMIN ER (GLUCOPHAGE-XR) 500 MG 24 hr tablet  Self, Medication Bottle No Yes    TAKE 1 TABLET BY MOUTH THREE TIMES DAILY    Patient taking differently:  Take 1 tablet by mouth 3 (Three) Times a Day With Meals.    omeprazole (priLOSEC) 20 MG capsule  Medication Bottle Yes Yes    Take 1 capsule by mouth Daily.    QUEtiapine (SEROquel) 50 MG tablet  Medication Bottle No Yes    TAKE 2 TABLETS BY MOUTH EVERY NIGHT    sildenafil (VIAGRA) 100 MG tablet  Self No Yes    Take 1 tablet by mouth Daily As Needed for erectile dysfunction.    Patient taking differently:  Take 1 tablet by mouth As Needed for Erectile Dysfunction.    Testosterone 1.62 % gel  Self, Pharmacy Yes Yes    Apply  topically to the appropriate area as directed Daily. APPLY 3 PUMPS TO THE APPROPRIATE AREA ONCE  DAILY AS DIRECTED    triamcinolone (KENALOG) 0.1 % cream   Yes Yes    Apply 1 Application topically to the appropriate area as directed 2 (Two) Times a Day.    Turmeric 500 MG capsule  Medication Bottle Yes Yes    Take 2 capsules by mouth Daily.    vitamin D (ERGOCALCIFEROL) 1.25 MG (16513 UT) capsule capsule  Pharmacy, Self Yes Yes    Take 1 capsule by mouth 1 (One) Time Per Week. Saturdays    amLODIPine (NORVASC) 10 MG tablet Not Taking Pharmacy No No    TAKE 1 TABLET BY MOUTH DAILY    Patient not taking:  Reported on 7/3/2024    baclofen (LIORESAL) 10 MG tablet   No No    Take 1 tablet by mouth 3 (Three) Times a Day As Needed for Muscle Spasms.    celecoxib (CeleBREX) 200 MG capsule   No No    Take 1 capsule by mouth Daily.    glimepiride (AMARYL) 2 MG tablet   No No    TAKE 1 TABLET BY MOUTH EVERY MORNING BEFORE BREAKFAST    lisinopril (PRINIVIL,ZESTRIL) 10 MG tablet Not Taking  Yes No    Patient not taking:  Reported on 7/3/2024    lisinopril (PRINIVIL,ZESTRIL) 20 MG tablet Not Taking  No No    Take 1 tablet by mouth Daily.    Patient not taking:  Reported on 7/3/2024    Trulicity 0.75 MG/0.5ML solution pen-injector Not Taking  No No    ADMINISTER 0.75 MG UNDER THE SKIN 1 TIME EVERY WEEK AS DIRECTED    Patient not taking:  Reported on 7/3/2024              Medication notes:     This medication list is complete to the best of my knowledge as of 7/3/2024    Please call if questions.    Bhavik Reed  Medication History Technician  [phone]    7/3/2024 13:14 EDT

## 2024-07-03 NOTE — CONSULTS
General Surgery     Summary:     Go Cho is a 59 y.o. year old with a left inguinal abscess with surrounding cellulitis. CT significant for 2.6cm loculated collection. Center of fluctuance with surrounding erythema and induration noted on exam. Mild leukocytosis, normal lactate, afebrile with stable vitals.   Plan:   OR  today for I&D with Dr. Cummings , this can be done under MAC with no major cardiac risk  NPO for OR today    Chief Complaint:    Left inguinal abscess    History of Present Illness:     Go Cho is a 59 y.o. year old who presented to the ED with Left inguinal pain, swelling, and redness. He states this started about 4 days ago and progressively got worse. He denies fever or chills. Denies anything like this prior. Patient also complains of bilateral lower extremity swelling that has been ongoing for the past 5 weeks. Denies SOB, chest pain, or cough. Denies any cardiac history. Cardiology has been consulted and echo has been ordered. Patient denies any previous surgeries.     Past Medical History:   Past Medical History:   Diagnosis Date    Diabetes mellitus     Hyperlipidemia     Hypertension     Sleep apnea         Past Surgical History:    Past Surgical History:   Procedure Laterality Date    COLONOSCOPY  02/01/2016    Howie Greer MD    UPPER GASTROINTESTINAL ENDOSCOPY  08/07/2015    Howie Greer MD       Family History:    Family History   Problem Relation Age of Onset    No Known Problems Mother     Heart disease Father        Social History:    Social History     Socioeconomic History    Marital status: Single   Tobacco Use    Smoking status: Every Day     Current packs/day: 0.25     Average packs/day: 0.3 packs/day for 25.0 years (6.3 ttl pk-yrs)     Types: Cigarettes    Smokeless tobacco: Never    Tobacco comments:      cont to wean  on6-8 cigs q  using vapor aid also   Substance and Sexual Activity    Alcohol use: Yes     Comment: social    Drug use: No     Every  day tobacco smoker  Occasional ETOH     Allergies:   No Known Allergies    Medications:   Norvasc  Aspirin 81 mg  Lipitor  Baclofen  Celebrex  Farxiga  Glimepiride  Hydrochlorothiazide  Norco  Lisinopril  Losartan  Metformin ER  Omeprazole  Seroquel  Viagra  Testosterone gel  Kenalog cream  Trulicity  Tumeric  Vitamin D    Radiology/Endoscopy:    CT abdomen pelvis with contrast 7/3/24  IMPRESSION:  1. Inflammatory change/cellulitis in the left inguinal subcutaneous  tissues.  2. There is a 2.6 cm walled off collection in the left inguinal region  consistent with a small abscess.  3. Bilateral inguinal lymphadenopathy largest on the left as discussed  in more detail above.  4. Fatty infiltration of the liver.     Radiation dose reduction techniques were utilized, including automated  exposure control and exposure modulation based on body size.    Labs:    Results from last 7 days   Lab Units 07/03/24  1139   WBC 10*3/mm3 13.61*   HEMOGLOBIN g/dL 14.1   HEMATOCRIT % 42.4   PLATELETS 10*3/mm3 318     Results from last 7 days   Lab Units 07/03/24  1139   SODIUM mmol/L 134*   POTASSIUM mmol/L 3.3*   CHLORIDE mmol/L 91*   CO2 mmol/L 30.8*   BUN mg/dL 7*   CREATININE mg/dL 0.73*   CALCIUM mg/dL 9.9   BILIRUBIN mg/dL 0.6   ALK PHOS U/L 86   ALT (SGPT) U/L 29   AST (SGOT) U/L 23   GLUCOSE mg/dL 105*   Lactate 1.5  Procalcitonin 0.12  PT 14.9  INR 1.15  proBNP 186  HS Troponin 31    Review of Systems   Constitutional:  Negative for appetite change, chills and fever.   HENT:  Negative for congestion and sore throat.    Respiratory:  Negative for chest tightness and shortness of breath.    Cardiovascular:  Positive for leg swelling. Negative for chest pain and palpitations.   Gastrointestinal:  Negative for abdominal pain, nausea and vomiting.   Genitourinary:  Negative for dysuria.   Musculoskeletal:  Negative for myalgias.   Skin:  Positive for color change and wound.   Neurological:  Negative for dizziness and  light-headedness.   Psychiatric/Behavioral:  The patient is not nervous/anxious.         Physical Exam  Constitutional:       General: He is not in acute distress.     Appearance: Normal appearance. He is not ill-appearing.   HENT:      Head: Normocephalic and atraumatic.   Eyes:      Extraocular Movements: Extraocular movements intact.      Pupils: Pupils are equal, round, and reactive to light.   Cardiovascular:      Rate and Rhythm: Normal rate.      Pulses: Normal pulses.   Pulmonary:      Effort: Pulmonary effort is normal.   Abdominal:      Palpations: Abdomen is soft.      Tenderness: There is no abdominal tenderness.   Musculoskeletal:      Right lower leg: Edema present.      Left lower leg: Edema present.   Skin:     Comments: Left groin/mons pubis with erythema, warmth and induration with a fluctuant center that is very painful to palpation   Neurological:      General: No focal deficit present.      Mental Status: He is alert and oriented to person, place, and time.   Psychiatric:         Mood and Affect: Mood normal.         Behavior: Behavior normal.                     WARREN Hernandez   General and Endoscopic Surgery  [facility]    [address], [address]  [place], [place], [address]  P: [phone]  F: [phone]

## 2024-07-03 NOTE — H&P
[facility] Admission H&P    Patient Care Team:  Priyanka Torres MD as PCP - General (Internal Medicine)    Chief complaint: Pain and swelling in left groin, lower extremity swelling    History of Present Illness    This is a very pleasant 59-year-old male who has a history of hypertension, DAVID, diabetes, hyperlipidemia.  He presented to the emergency room with above-stated complaints.  He states that his pain in the left groin started as what he thought was an ingrown hair a couple of days ago.  The groin began to swell and become tender and warm to the touch.  Workup in the emergency room revealed inguinal cellulitis and a small abscess.  He denies any fevers or chills.  He also has noted bilateral lower extremity edema which has been progressive over the past 4 to 5 weeks.  He was placed on HCTZ by his primary care physician a couple of days ago but this has not helped.  He denies any shortness of breath.  States he gets somewhat winded if he speaks in too long of sentences.  Denies orthopnea but does sleep on 3 pillows chronically.  Denies any weight gain, abdominal distention.  He denies chest pain or palpitations.  No cough or wheezing.    Past Medical History:   Diagnosis Date    Diabetes mellitus     Hyperlipidemia     Hypertension     Sleep apnea      Past Surgical History:   Procedure Laterality Date    COLONOSCOPY  02/01/2016    Howie Greer MD    UPPER GASTROINTESTINAL ENDOSCOPY  08/07/2015    Howie Greer MD     Family History   Problem Relation Age of Onset    No Known Problems Mother     Heart disease Father      Social History     Tobacco Use    Smoking status: Every Day     Current packs/day: 0.25     Average packs/day: 0.3 packs/day for 25.0 years (6.3 ttl pk-yrs)     Types: Cigarettes    Smokeless tobacco: Never    Tobacco comments:      cont to wean  on6-8 cigs q  using vapor aid also   Substance Use Topics    Alcohol use: Yes     Comment: social    Drug use: No     Medications  reviewed    Allergies:  Patient has no known allergies.    Review of Systems   Constitutional:  Negative for chills, fatigue and fever.   HENT:  Negative for congestion, sore throat and trouble swallowing.    Eyes:  Negative for visual disturbance.   Respiratory:  Negative for cough, chest tightness and shortness of breath.    Cardiovascular:  Positive for leg swelling. Negative for chest pain and palpitations.   Gastrointestinal:  Negative for abdominal pain, blood in stool, constipation, diarrhea, nausea and vomiting.        Pain and swelling in the left groin   Endocrine: Negative for polydipsia and polyuria.   Genitourinary:  Negative for difficulty urinating, dysuria, frequency, hematuria and urgency.   Musculoskeletal:  Negative for arthralgias, joint swelling and myalgias.   Skin:  Negative for rash and wound.   Neurological:  Negative for dizziness, weakness, light-headedness and numbness.        PHYSICAL EXAM    Vital Signs  tMax 24 hrs:  Temp (24hrs), Av °F (36.7 °C), Min:98 °F (36.7 °C), Max:98 °F (36.7 °C)    Vitals Ranges:  Temp:  [98 °F (36.7 °C)] 98 °F (36.7 °C)  Heart Rate:  [] 86  Resp:  [18] 18  BP: (164-170)/(87-94) 170/94    Physical Exam  Vitals and nursing note reviewed.   Constitutional:       Appearance: He is well-developed.   HENT:      Head: Normocephalic and atraumatic.   Eyes:      Pupils: Pupils are equal, round, and reactive to light.   Neck:      Trachea: No tracheal deviation.   Cardiovascular:      Rate and Rhythm: Normal rate and regular rhythm.      Heart sounds: No murmur heard.     No gallop.   Pulmonary:      Effort: Pulmonary effort is normal. No respiratory distress.      Breath sounds: No wheezing.      Comments: Crackles at the bilateral bases  Abdominal:      General: Bowel sounds are normal. There is no distension.      Palpations: Abdomen is soft.      Tenderness: There is no abdominal tenderness.   Genitourinary:     Comments: He has swelling with induration  and erythema/warmth to the left inguinal region.  The area is tender  Musculoskeletal:         General: No tenderness.      Cervical back: Neck supple.      Right lower leg: Edema present.      Left lower leg: Edema present.   Skin:     General: Skin is warm and dry.   Neurological:      Mental Status: He is alert and oriented to person, place, and time.      Cranial Nerves: No cranial nerve deficit.         Results Review:  Results from last 7 days   Lab Units 07/03/24  1139   WBC 10*3/mm3 13.61*   HEMOGLOBIN g/dL 14.1   HEMATOCRIT % 42.4   PLATELETS 10*3/mm3 318     Results from last 7 days   Lab Units 07/03/24  1139   SODIUM mmol/L 134*   POTASSIUM mmol/L 3.3*   CHLORIDE mmol/L 91*   CO2 mmol/L 30.8*   BUN mg/dL 7*   CREATININE mg/dL 0.73*   CALCIUM mg/dL 9.9   BILIRUBIN mg/dL 0.6   ALK PHOS U/L 86   ALT (SGPT) U/L 29   AST (SGOT) U/L 23   GLUCOSE mg/dL 105*        I reviewed the patient's new clinical results.  I reviewed the patient's new imaging results and agree with the interpretation.  I personally viewed and interpreted the patient's EKG/Telemetry data        Active Hospital Problems    Diagnosis  POA    **Peripheral edema [R60.0]  Yes    Soft tissue abscess of inguinal region [L02.214]  Unknown    Hypokalemia [E87.6]  Unknown    Hyponatremia [E87.1]  Yes    Type 2 diabetes mellitus with hyperglycemia [E11.65]  Yes    Tobacco abuse [Z72.0]  Yes    Leukocytosis [D72.829]  Yes      Resolved Hospital Problems   No resolved problems to display.       Assessment & Plan    The patient is going to be admitted for further treatment and evaluation of his left inguinal cellulitis with abscess.  Will place him on broad-spectrum antibiotics.  Blood cultures have been obtained.  General surgery has been consulted.  Will keep him n.p.o. until they evaluate.    With regards to his progressive lower extremity edema over the past 4 to 5 weeks, I will check an echocardiogram and lower extremity Doppler.  He has some  crackles on exam and I will give him a dose of IV Lasix.  He is presently on 4 L of oxygen with saturation of 96% but denies shortness of breath.  I will check a CT scan of the chest.  BNP is unremarkable at 186.  His troponin is slightly elevated at 41 and will begin to trend this.  He denies any chest pain.  I will go ahead and ask cardiology to evaluate him.  He has a family history of CAD.    Will replace potassium and magnesium    Start sliding scale insulin to temporize blood sugars.  Currently these appear well-controlled.    Additional plans based on his clinical course.    I discussed the patients findings and my recommendations with patient      Rios Castro MD  07/03/24  13:23 EDT

## 2024-07-03 NOTE — ED PROVIDER NOTES
EMERGENCY DEPARTMENT ENCOUNTER    Room Number:  N436/1  Date of encounter:  7/3/2024  PCP: Priyanka Torres MD  Historian: Patient and mother  Relevant information and history provided by sources other than the patient will be included below and in the ED Course.  Review of pertinent past medical records may also be included in record below and ED Course.    HPI:  Chief Complaint: Swelling to lower extremities and now swelling and pain to his left groin  A complete HPI/ROS/PMH/PSH/SH/FH are unobtainable due to: Not applicable  Context: Go Cho is a 59 y.o. male who presents to the ED c/o this patient has been having swelling to his lower extremities bilaterally for over 5 weeks.  He states has been fairly constant.  He works as a  and is on his feet most of the day or at least sitting down when he is not on his feet.  He does not notice any significant improvement of the swelling in the morning after he lays down.  He is then developed over the past 4 to 5 days pain and swelling to his left groin.  He is concerned that he might have an ingrown hair there.  He reports no fever.  He reports no chest pain or shortness of breath.  He did start hydrochlorothiazide for swelling to his lower extremity after his primary care doctor's evaluation on 6/27/2024.  He has been taking that with no relief of his symptoms and feels that the swelling is worsening.  He denies any exertional shortness of breath.  Denies really any abdominal swelling other than the swelling to the left side of his groin with tenderness there.  Denies any fevers or chills.  He only drinks alcohol occasionally.  He spoke with his primary care physician yesterday and he told him that he needs to go to the emergency department for further evaluation.        Previous Episodes: No  Current Symptoms: See above    MEDICAL HISTORY REVIEWED  I reviewed the note from his primary care physician at [facility] on 6/27/2024.  According to the note  from the physician on 6/27/2024 patient been complaining of swelling of both feet that started about 1 month ago.  No complaint of chest pain or shortness of breath does have a history of hypertension, diabetes mellitus I did review his medicine list at that time I can see that he was on a diuretic hydrochlorothiazide 12.5 mg a day as well as several other medicines including losartan for blood pressure he ordered an echo, lab work and BMP and hydrochlorothiazide.  His BNP was 25.  His glucose was 108 his creatinine was normal at 0.73 with a normal BUN sodium was 134 do not believe the echo has been completed as of yet as I do not see any results in care everywhere in [facility].      PAST MEDICAL HISTORY  Active Ambulatory Problems     Diagnosis Date Noted    Hyponatremia 10/03/2016    Dyspnea 10/03/2016    Type 2 diabetes mellitus with hyperglycemia 10/03/2016    Tobacco abuse 10/03/2016    Leukocytosis 10/03/2016    Myoglobinuria 10/04/2016    Abnormal LFTs (liver function tests) 10/04/2016    Chronic bilateral low back pain without sciatica 08/18/2021    DDD (degenerative disc disease), lumbar 08/18/2021    Lumbar facet joint syndrome 08/18/2021    Bulge of lumbar disc without myelopathy 08/18/2021     Resolved Ambulatory Problems     Diagnosis Date Noted    Hypoxia 10/03/2016     Past Medical History:   Diagnosis Date    Diabetes mellitus     Hyperlipidemia     Hypertension     Sleep apnea          PAST SURGICAL HISTORY  Past Surgical History:   Procedure Laterality Date    COLONOSCOPY  02/01/2016    Howie Greer MD    UPPER GASTROINTESTINAL ENDOSCOPY  08/07/2015    Howie Greer MD         FAMILY HISTORY  Family History   Problem Relation Age of Onset    No Known Problems Mother     Heart disease Father          SOCIAL HISTORY  Social History     Socioeconomic History    Marital status: Single   Tobacco Use    Smoking status: Every Day     Current packs/day: 0.25     Average packs/day: 0.3 packs/day for 25.0  years (6.3 ttl pk-yrs)     Types: Cigarettes    Smokeless tobacco: Never    Tobacco comments:      cont to wean  on6-8 cigs q  using vapor aid also   Vaping Use    Vaping status: Some Days    Substances: Nicotine   Substance and Sexual Activity    Alcohol use: Yes     Comment: social    Drug use: No         ALLERGIES  Patient has no known allergies.        REVIEW OF SYSTEMS  Review of Systems     All systems reviewed and negative except for those discussed in HPI.       PHYSICAL EXAM    I have reviewed the triage vital signs and nursing notes.    ED Triage Vitals [07/03/24 0955]   Temp Heart Rate Resp BP SpO2   98 °F (36.7 °C) 103 18 -- 96 %      Temp src Heart Rate Source Patient Position BP Location FiO2 (%)   Tympanic -- -- -- --       GENERAL: Male.  No acute vascular respiratory distress.Vital signs on my initial evaluation have been reviewed.  HENT: nares patent  Head/neck/ face are symmetric without gross deformity, signs of trauma, or swelling  EYES: no scleral icterus, no conjunctival pallor.  NECK: Supple, no meningismus  CV: regular rhythm, regular rate with intact distal pulses.  No murmur or rub.  Patient does have distal heart sounds  RESPIRATORY: normal effort and no respiratory distress.  Clear to auscultation bilaterally  ABDOMEN: soft and morbidly obese.  He does not have any obvious swelling to his abdomen.  To his left inguinal area he has some swelling some induration and redness and tenderness.  I do not feel any obvious fluctuance.  There is no purulent drainage.  MUSCULOSKELETAL: no deformity.  2+ edema to bilateral lower extremities that appear symmetric.  Start in the feet and then go up to just proximal to the knees.  There is some slight erythema noted to bilateral lower extremities at the feet and ankles.  There is a little warmth on palpation.  No crepitance.  No obvious fluctuance.  NEURO: alert and appropriate, moves all extremities, follows commands.  No focal motor or sensory  changes  SKIN: warm, dry    Vital signs and nursing notes reviewed.        LAB RESULTS  Recent Results (from the past 24 hour(s))   ECG 12 Lead Other; New onset swelling to lower extremities    Collection Time: 07/03/24 11:07 AM   Result Value Ref Range    QT Interval 347 ms    QTC Interval 425 ms   Comprehensive Metabolic Panel    Collection Time: 07/03/24 11:39 AM    Specimen: Blood   Result Value Ref Range    Glucose 105 (H) 65 - 99 mg/dL    BUN 7 (L) 8 - 23 mg/dL    Creatinine 0.73 (L) 0.76 - 1.27 mg/dL    Sodium 134 (L) 136 - 145 mmol/L    Potassium 3.3 (L) 3.5 - 5.2 mmol/L    Chloride 91 (L) 98 - 107 mmol/L    CO2 30.8 (H) 22.0 - 29.0 mmol/L    Calcium 9.9 8.6 - 10.5 mg/dL    Total Protein 8.5 6.0 - 8.5 g/dL    Albumin 4.2 3.5 - 5.2 g/dL    ALT (SGPT) 29 1 - 41 U/L    AST (SGOT) 23 1 - 40 U/L    Alkaline Phosphatase 86 39 - 117 U/L    Total Bilirubin 0.6 0.0 - 1.2 mg/dL    Globulin 4.3 gm/dL    A/G Ratio 1.0 g/dL    BUN/Creatinine Ratio 9.6 7.0 - 25.0    Anion Gap 12.2 5.0 - 15.0 mmol/L    eGFR 104.2 >60.0 mL/min/1.73   Protime-INR    Collection Time: 07/03/24 11:39 AM    Specimen: Blood   Result Value Ref Range    Protime 14.9 (H) 11.7 - 14.2 Seconds    INR 1.15 (H) 0.90 - 1.10   Urinalysis With Microscopic If Indicated (No Culture) - Urine, Clean Catch    Collection Time: 07/03/24 11:39 AM    Specimen: Urine, Clean Catch   Result Value Ref Range    Color, UA Yellow Yellow, Straw    Appearance, UA Clear Clear    pH, UA 6.5 5.0 - 8.0    Specific Gravity, UA 1.016 1.005 - 1.030    Glucose, UA >=1000 mg/dL (3+) (A) Negative    Ketones, UA Negative Negative    Bilirubin, UA Negative Negative    Blood, UA Negative Negative    Protein, UA Negative Negative    Leuk Esterase, UA Negative Negative    Nitrite, UA Negative Negative    Urobilinogen, UA 1.0 E.U./dL 0.2 - 1.0 E.U./dL   Lipase    Collection Time: 07/03/24 11:39 AM    Specimen: Blood   Result Value Ref Range    Lipase 32 13 - 60 U/L   BNP    Collection Time:  07/03/24 11:39 AM    Specimen: Blood   Result Value Ref Range    proBNP 186.0 0.0 - 900.0 pg/mL   High Sensitivity Troponin T    Collection Time: 07/03/24 11:39 AM    Specimen: Blood   Result Value Ref Range    HS Troponin T 41 (H) <22 ng/L   Lactic Acid, Plasma    Collection Time: 07/03/24 11:39 AM    Specimen: Blood   Result Value Ref Range    Lactate 1.5 0.5 - 2.0 mmol/L   Procalcitonin    Collection Time: 07/03/24 11:39 AM    Specimen: Blood   Result Value Ref Range    Procalcitonin 0.12 0.00 - 0.25 ng/mL   Magnesium    Collection Time: 07/03/24 11:39 AM    Specimen: Blood   Result Value Ref Range    Magnesium 1.5 (L) 1.6 - 2.4 mg/dL   CBC Auto Differential    Collection Time: 07/03/24 11:39 AM    Specimen: Blood   Result Value Ref Range    WBC 13.61 (H) 3.40 - 10.80 10*3/mm3    RBC 4.68 4.14 - 5.80 10*6/mm3    Hemoglobin 14.1 13.0 - 17.7 g/dL    Hematocrit 42.4 37.5 - 51.0 %    MCV 90.6 79.0 - 97.0 fL    MCH 30.1 26.6 - 33.0 pg    MCHC 33.3 31.5 - 35.7 g/dL    RDW 12.4 12.3 - 15.4 %    RDW-SD 40.4 37.0 - 54.0 fl    MPV 9.0 6.0 - 12.0 fL    Platelets 318 140 - 450 10*3/mm3    Neutrophil % 80.2 (H) 42.7 - 76.0 %    Lymphocyte % 11.2 (L) 19.6 - 45.3 %    Monocyte % 6.8 5.0 - 12.0 %    Eosinophil % 0.9 0.3 - 6.2 %    Basophil % 0.4 0.0 - 1.5 %    Immature Grans % 0.5 0.0 - 0.5 %    Neutrophils, Absolute 10.92 (H) 1.70 - 7.00 10*3/mm3    Lymphocytes, Absolute 1.52 0.70 - 3.10 10*3/mm3    Monocytes, Absolute 0.92 (H) 0.10 - 0.90 10*3/mm3    Eosinophils, Absolute 0.12 0.00 - 0.40 10*3/mm3    Basophils, Absolute 0.06 0.00 - 0.20 10*3/mm3    Immature Grans, Absolute 0.07 (H) 0.00 - 0.05 10*3/mm3    nRBC 0.0 0.0 - 0.2 /100 WBC   High Sensitivity Troponin T 2Hr    Collection Time: 07/03/24  1:56 PM    Specimen: Blood   Result Value Ref Range    HS Troponin T 31 (H) <22 ng/L    Troponin T Delta -10 (L) >=-4 - <+4 ng/L       Ordered the above labs and independently reviewed the results.        RADIOLOGY  CT Abdomen Pelvis  With Contrast    Result Date: 7/3/2024  CT OF THE ABDOMEN AND PELVIS WITH CONTRAST 07/03/2024  HISTORY: Lower extremity edema. Left groin tenderness.  Axial images were obtained from the lung bases to the symphysis pubis after intravenous contrast. No oral contrast was given.  There is mild fatty infiltration of the liver. The gallbladder, spleen, pancreas, adrenals and kidneys appear unremarkable.  There is some aortoiliac calcification. Urinary bladder and prostate gland appear normal.  There are multiple bilateral inguinal nodes which are small to mildly enlarged and largest is seen on the left. The largest left inguinal node measures up to approximately 1.7 cm. There is injection of the subcutaneous fat in the left inguinal region. In addition there is an approximately 2.6 cm walled off low-density collection in the subcutaneous region of the left groin which resembles an abscess.  No other abnormal fluid collections are seen.  No bowel wall thickening or bowel dilatation is seen. There is colonic diverticulosis. Urinary bladder and prostate gland appear normal.      1. Inflammatory change/cellulitis in the left inguinal subcutaneous tissues. 2. There is a 2.6 cm walled off collection in the left inguinal region consistent with a small abscess. 3. Bilateral inguinal lymphadenopathy largest on the left as discussed in more detail above. 4. Fatty infiltration of the liver.  Radiation dose reduction techniques were utilized, including automated exposure control and exposure modulation based on body size.       XR Chest 1 View    Result Date: 7/3/2024  Portable chest radiograph  HISTORY: Peripheral edema  TECHNIQUE: Single AP portable radiograph of the chest  COMPARISON: Chest radiograph 10/2/2016      FINDINGS AND IMPRESSION: Suggestion of subtle interstitial thickening within the periphery of the bilateral lung bases which may represent Kerley B-lines in the appropriate context. Findings also may be secondary to  background chronic interstitial lung disease. Correlation with patient history is recommended with follow-up chest CT if clinically indicated. No pneumothorax is seen. Cardiac silhouette within normal limits for size.  This report was finalized on 7/3/2024 11:31 AM by Dr. Oscar Buck M.D on Workstation: [id number]       I ordered the above noted radiological studies. Reviewed by me and discussed with radiologist.  See dictation for official radiology interpretation.      PROCEDURES    Procedures      MEDICATIONS GIVEN IN ER    Medications   sodium chloride 0.9 % flush 10 mL (has no administration in time range)   magnesium sulfate 2g/50 mL (PREMIX) infusion (has no administration in time range)   Potassium Replacement - Follow Nurse / BPA Driven Protocol (has no administration in time range)   aspirin EC tablet 81 mg (81 mg Oral Not Given 7/3/24 1544)   HYDROcodone-acetaminophen (NORCO) 7.5-325 MG per tablet 1 tablet (1 tablet Oral Given 7/3/24 1542)   losartan (COZAAR) tablet 100 mg (100 mg Oral Not Given 7/3/24 1545)   pantoprazole (PROTONIX) EC tablet 40 mg (has no administration in time range)   QUEtiapine (SEROquel) tablet 100 mg (has no administration in time range)   sodium chloride 0.9 % flush 10 mL (has no administration in time range)   sodium chloride 0.9 % flush 10 mL (has no administration in time range)   sodium chloride 0.9 % infusion 40 mL (has no administration in time range)   nitroglycerin (NITROSTAT) SL tablet 0.4 mg (has no administration in time range)   Potassium Replacement - Follow Nurse / BPA Driven Protocol (has no administration in time range)   sennosides-docusate (PERICOLACE) 8.6-50 MG per tablet 2 tablet (has no administration in time range)     And   polyethylene glycol (MIRALAX) packet 17 g (has no administration in time range)     And   bisacodyl (DULCOLAX) EC tablet 5 mg (has no administration in time range)     And   bisacodyl (DULCOLAX) suppository 10 mg (has no  administration in time range)   ondansetron (ZOFRAN) injection 4 mg (has no administration in time range)   nicotine (NICODERM CQ) 21 MG/24HR patch 1 patch (has no administration in time range)   nicotine polacrilex (NICORETTE) gum 2 mg (has no administration in time range)   piperacillin-tazobactam (ZOSYN) 3.375 g IVPB in 100 mL NS MBP (CD) (has no administration in time range)   Pharmacy to dose vancomycin (has no administration in time range)   dextrose (GLUTOSE) oral gel 15 g (has no administration in time range)   dextrose (D50W) (25 g/50 mL) IV injection 25 g (has no administration in time range)   glucagon (GLUCAGEN) injection 1 mg (has no administration in time range)   insulin lispro (HUMALOG/ADMELOG) injection 2-7 Units (has no administration in time range)   vancomycin 2250 mg/500 mL 0.9% NS IVPB ([facility]) (2,250 mg Intravenous New Bag 7/3/24 1450)   Magnesium Standard Dose Replacement - Follow Nurse / BPA Driven Protocol (has no administration in time range)   Vancomycin HCl 1,250 mg in sodium chloride 0.9 % 250 mL VTB (has no administration in time range)   potassium chloride 10 mEq in 100 mL IVPB (has no administration in time range)   iopamidol (ISOVUE-300) 61 % injection 100 mL (85 mL Intravenous Given by Other 7/3/24 1202)   HYDROmorphone (DILAUDID) injection 0.5 mg (0.5 mg Intravenous Given 7/3/24 1219)   ondansetron (ZOFRAN) injection 4 mg (4 mg Intravenous Given 7/3/24 1219)   piperacillin-tazobactam (ZOSYN) 3.375 g IVPB in 100 mL NS MBP (CD) (3.375 g Intravenous New Bag 7/3/24 1402)   furosemide (LASIX) injection 40 mg (40 mg Intravenous Given 7/3/24 1541)         All labs have been independently reviewed by me.  All radiology studies have been reviewed by me and I discussed with radiologist dictating the report when indicated below.  All EKG's independently viewed and interpreted by me.  Discussion below represents my analysis of pertinent findings related to patient's condition, differential  diagnosis, treatment plan and final disposition.        PROGRESS, DATA ANALYSIS, CONSULTS, AND MEDICAL DECISION MAKING    Differential diagnosis includes   - hepatobiliary pathology such as cholecystitis, cholangitis, and symptomatic cholelithiasis  -PUD  -Mesenteric ischemia  - Pancreatitis  - Dyspepsia  - Small bowel or large bowel obstruction  - Appendicitis  - Diverticulitis  - UTI including pyelonephritis  - Ureteral stone  - Zoster  - Colitis, including infectious and ischemic  - Atypical ACS  Concerned this patient has an infectious process to his left inguinal area very well could be an ingrown hair that is now developed a cellulitis.  The area is red tender and indurated.  Will check a CT scan of his abdomen pelvis and check lab work.  Concerning the swelling to his lower extremities.  It is not improved with diuretics.  He is never had swelling like this before.  It is symmetric and is bilateral.  He is not have any chest pain or shortness of breath.  There is some family history of heart failure but he is unaware if there is any specific congenital cardiomyopathy in the family.  He has not had his echo that his primary care physician has ordered as of yet.  His swelling is fairly significant.  I do not anticipate he is got an infection to his lower extremities bilaterally.  Informed him and his mother of the test that we will order.  All questions answered at this time.      ED Course as of 07/03/24 1617   Wed Jul 03, 2024   1116 My own independent interpretation of the EKG that was done at 11:07 AM reveals a rate of 90 it is sinus rhythm there is some intraventricular conduction delay there is appearance of left atrial enlargement there is some nonspecific ST and T wave changes some Q waves in the anterior septal leads  QT looks normal  Compared to the previous EKG in October 2, 2016 and there is some changes in this new EKG but they are nonspecific. [MM]   1225 HS Troponin T(!): 41 [MM]   1226  Magnesium(!): 1.5 [MM]   1226 Potassium(!): 3.3 [MM]   1226 Creatinine(!): 0.73 [MM]   1226 BUN(!): 7  This gentleman is not having any chest pain or shortness of breath.  There is no exertional component of any exertional chest pain or shortness of breath.  Will continue to trend. [MM]   1226 WBC(!): 13.61 [MM]   1226 Neutrophil Rel %(!): 80.2 [MM]   1227 Patient in looking at the chest x-ray and reviewing the radiologist report there is some interstitial thickening in the periphery of the bilateral lung bases which very well could represent early mild congestive heart failure could also be chronic interstitial lung disease.  Please see complete dictated report from radiologist [MM]   1228 I reviewed the CT scan report from radiologist.  There is some inflammatory change and cellulitis in the left inguinal subcutaneous tissue there is this 2.6 cm walled off collection in the left inguinal region which could represent a small abscess.  There is bilateral inguinal adenopathy.  There is fatty infiltration of the liver.  Please see complete dictated report from radiologist. [MM]   1230 Lactate: 1.5 [MM]   1244 Informed the patient of the results of the CT scan and lab work.  Informed him of the treatment plan.  All questions answered at this time. [MM]   1253 I have reevaluated this patient.  Informed him of the results of the test.  He did drop his oxygen briefly after pain medicine.  His oxygen level is normal on 2 L.  Denies any chest pain or shortness of breath.  All questions answered. [MM]   1253 I did discuss the case with Dr. Castro who is on for [facility].  Informed him of the patient's presenting symptoms and results of the test.  All questions answered at this time.  Agrees to admit the patient to the hospital. [MM]   1611 I never received return call from general surgery.  I did inform Dr. Storey.  He will put a consult out to them. [MM]      ED Course User Index  [MM] Will Ely MD       AS OF 16:17 EDT  VITALS:    BP - 147/78  HR - 93  TEMP - 99.5 °F (37.5 °C) (Oral)  02 SATS - 96%    SOCIAL DETERMINANTS OF HEALTH THAT IMPACT OR LIMIT CARE (For example..Homelessness,safe discharge, inability to obtain care, follow up, or prescriptions):      DIAGNOSIS  Final diagnoses:   Peripheral edema, bilateral lower extremities   Cellulitis of other specified site: Cellulitis and abscess of left inguinal region   Troponin level elevated   Hypomagnesemia   Hypokalemia         DISPOSITION  I have reviewed the test results with my patient and explained the current treatment plan.  I answered all of the patient's questions.  The patient will be admitted to monitor bed at this time.  The patient is not hypotensive and is tolerating their current disease condition well enough for a monitored bed at this time.  The patient's current condition does not require intensive care treatment at this time.            DICTATED UTILIZING [facility] DICTATION    Note Disclaimer: At [facility], we believe that sharing information builds trust and better relationships. You are receiving this note because you recently visited [facility]. It is possible you will see health information before a provider has talked with you about it. This kind of information can be easy to misunderstand. To help you fully understand what it means for your health, we urge you to discuss this note with your provider.       Will Ely MD  07/03/24 2532  I have refreshed and signed the chart again per request of medical records because the ED chart has been updated.     Will Ely MD  07/03/24 1070

## 2024-07-03 NOTE — ANESTHESIA PROCEDURE NOTES
Airway  Urgency: emergent    Date/Time: 7/3/2024 6:30 PM  Airway not difficult    General Information and Staff    Patient location during procedure: OR  Anesthesiologist: Jw Garcia MD  CRNA/CAA: John Suarez CRNA    Consent for Airway (if performed for an anesthetic, see related documentation for consents)  Consent: No emergent situation.      Indications and Patient Condition  Indications for airway management: airway protection    Preoxygenated: yes  MILS not maintained throughout  Mask difficulty assessment: 0 - not attempted (RSI)    Final Airway Details  Final airway type: endotracheal airway      Successful airway: ETT  Cuffed: yes   Successful intubation technique: direct laryngoscopy  Facilitating devices/methods: cricoid pressure  Endotracheal tube insertion site: oral  Blade: Bruce  Blade size: 4  ETT size (mm): 7.0  Cormack-Lehane Classification: grade IIb - view of arytenoids or posterior of glottis only  Placement verified by: capnometry   Cuff volume (mL): 9  Measured from: lips  ETT/EBT  to lips (cm): 23  Number of attempts at approach: 1  Assessment: lips, teeth, and gum same as pre-op and atraumatic intubation

## 2024-07-03 NOTE — PLAN OF CARE
Goal Outcome Evaluation:   Vital signs stable.  Up with standby assist.  On 2 LPM per nasal cannula.  Electrolytes replaced per protocol, IV antibiotics started.  Diuresis started.  Sent for incision and drainage of abscess.  Edema to BLE noted.  Estimated discharge date: 2 days.

## 2024-07-03 NOTE — ED NOTES
Pt ambulatory to triage, reports bilateral foot swelling and penile swelling. Swelling for last week.

## 2024-07-03 NOTE — ED NOTES
"Nursing report ED to floor  Go Cho  59 y.o.  male    HPI :   Chief Complaint   Patient presents with    Foot Swelling    Groin Swelling       Admitting doctor:   Rios Castro MD    Admitting diagnosis:   The primary encounter diagnosis was Peripheral edema, bilateral lower extremities. Diagnoses of Cellulitis of other specified site: Cellulitis and abscess of left inguinal region, Troponin level elevated, Hypomagnesemia, and Hypokalemia were also pertinent to this visit.    Code status:   Current Code Status       Date Active Code Status Order ID Comments User Context       Prior            Allergies:   Patient has no known allergies.    Intake and Output  No intake or output data in the 24 hours ending 07/03/24 1316    Weight:       07/03/24  0955   Weight: 108 kg (237 lb)       Most recent vitals:   Vitals:    07/03/24 0955 07/03/24 1141 07/03/24 1300 07/03/24 1301   BP:  164/87  170/94   Pulse: 103  84 86   Resp: 18      Temp: 98 °F (36.7 °C)      TempSrc: Tympanic      SpO2: 96%  96% 99%   Weight: 108 kg (237 lb)      Height: 177.8 cm (70\")          Active LDAs/IV Access:   Lines, Drains & Airways       Active LDAs       Name Placement date Placement time Site Days    Peripheral IV 07/03/24 1145 Right Antecubital 07/03/24  1145  Antecubital  less than 1                    Labs (abnormal labs have a star):   Labs Reviewed   COMPREHENSIVE METABOLIC PANEL - Abnormal; Notable for the following components:       Result Value    Glucose 105 (*)     BUN 7 (*)     Creatinine 0.73 (*)     Sodium 134 (*)     Potassium 3.3 (*)     Chloride 91 (*)     CO2 30.8 (*)     All other components within normal limits    Narrative:     GFR Normal >60  Chronic Kidney Disease <60  Kidney Failure <15     PROTIME-INR - Abnormal; Notable for the following components:    Protime 14.9 (*)     INR 1.15 (*)     All other components within normal limits   URINALYSIS W/ MICROSCOPIC IF INDICATED (NO CULTURE) - " Abnormal; Notable for the following components:    Glucose, UA >=1000 mg/dL (3+) (*)     All other components within normal limits    Narrative:     Urine microscopic not indicated.   TROPONIN - Abnormal; Notable for the following components:    HS Troponin T 41 (*)     All other components within normal limits    Narrative:     High Sensitive Troponin T Reference Range:  <14.0 ng/L- Negative Female for AMI  <22.0 ng/L- Negative Male for AMI  >=14 - Abnormal Female indicating possible myocardial injury.  >=22 - Abnormal Male indicating possible myocardial injury.   Clinicians would have to utilize clinical acumen, EKG, Troponin, and serial changes to determine if it is an Acute Myocardial Infarction or myocardial injury due to an underlying chronic condition.        MAGNESIUM - Abnormal; Notable for the following components:    Magnesium 1.5 (*)     All other components within normal limits   CBC WITH AUTO DIFFERENTIAL - Abnormal; Notable for the following components:    WBC 13.61 (*)     Neutrophil % 80.2 (*)     Lymphocyte % 11.2 (*)     Neutrophils, Absolute 10.92 (*)     Monocytes, Absolute 0.92 (*)     Immature Grans, Absolute 0.07 (*)     All other components within normal limits   LIPASE - Normal   BNP (IN-HOUSE) - Normal    Narrative:     This assay is used as an aid in the diagnosis of individuals suspected of having heart failure. It can be used as an aid in the diagnosis of acute decompensated heart failure (ADHF) in patients presenting with signs and symptoms of ADHF to the emergency department (ED). In addition, NT-proBNP of <300 pg/mL indicates ADHF is not likely.    Age Range Result Interpretation  NT-proBNP Concentration (pg/mL:      <50             Positive            >450                   Gray                 300-450                    Negative             <300    50-75           Positive            >900                  Gray                300-900                  Negative             "<300      >75             Positive            >1800                  Gray                300-1800                  Negative            <300   LACTIC ACID, PLASMA - Normal   PROCALCITONIN - Normal    Narrative:     As a Marker for Sepsis (Non-Neonates):    1. <0.5 ng/mL represents a low risk of severe sepsis and/or septic shock.  2. >2 ng/mL represents a high risk of severe sepsis and/or septic shock.    As a Marker for Lower Respiratory Tract Infections that require antibiotic therapy:    PCT on Admission    Antibiotic Therapy       6-12 Hrs later    >0.5                Strongly Recommended  >0.25 - <0.5        Recommended   0.1 - 0.25          Discouraged              Remeasure/reassess PCT  <0.1                Strongly Discouraged     Remeasure/reassess PCT    As 28 day mortality risk marker: \"Change in Procalcitonin Result\" (>80% or <=80%) if Day 0 (or Day 1) and Day 4 values are available. Refer to http://www.[employer]-pct-calculator.com    Change in PCT <=80%  A decrease of PCT levels below or equal to 80% defines a positive change in PCT test result representing a higher risk for 28-day all-cause mortality of patients diagnosed with severe sepsis for septic shock.    Change in PCT >80%  A decrease of PCT levels of more than 80% defines a negative change in PCT result representing a lower risk for 28-day all-cause mortality of patients diagnosed with severe sepsis or septic shock.      BLOOD CULTURE   BLOOD CULTURE   HIGH SENSITIVITIY TROPONIN T 2HR   CBC AND DIFFERENTIAL    Narrative:     The following orders were created for panel order CBC & Differential.  Procedure                               Abnormality         Status                     ---------                               -----------         ------                     CBC Auto Differential[[id number]]        Abnormal            Final result                 Please view results for these tests on the individual orders.       EKG:   ECG 12 Lead Other; New " onset swelling to lower extremities   Preliminary Result   HEART RATE=90  bpm   RR Wiphnhjc=918  ms   MS Wuxwmpfd=684  ms   P Horizontal Axis=-24  deg   P Front Axis=31  deg   QRSD Interval=97  ms   QT Gzdqloyk=987  ms   JOpX=881  ms   QRS Axis=-11  deg   T Wave Axis=62  deg   - ABNORMAL ECG -   Sinus rhythm   Probable left atrial enlargement   Low voltage, precordial leads   ST elevation, consider inferior injury   Date and Time of Study:2024-07-03 11:07:54          Meds given in ED:   Medications   sodium chloride 0.9 % flush 10 mL (has no administration in time range)   piperacillin-tazobactam (ZOSYN) 3.375 g IVPB in 100 mL NS MBP (CD) (has no administration in time range)   magnesium sulfate 2g/50 mL (PREMIX) infusion (has no administration in time range)   Potassium Replacement - Follow Nurse / BPA Driven Protocol (has no administration in time range)   iopamidol (ISOVUE-300) 61 % injection 100 mL (85 mL Intravenous Given by Other 7/3/24 1202)   HYDROmorphone (DILAUDID) injection 0.5 mg (0.5 mg Intravenous Given 7/3/24 1219)   ondansetron (ZOFRAN) injection 4 mg (4 mg Intravenous Given 7/3/24 1219)       Imaging results:  CT Abdomen Pelvis With Contrast    Result Date: 7/3/2024  1. Inflammatory change/cellulitis in the left inguinal subcutaneous tissues. 2. There is a 2.6 cm walled off collection in the left inguinal region consistent with a small abscess. 3. Bilateral inguinal lymphadenopathy largest on the left as discussed in more detail above. 4. Fatty infiltration of the liver.  Radiation dose reduction techniques were utilized, including automated exposure control and exposure modulation based on body size.       XR Chest 1 View    Result Date: 7/3/2024  FINDINGS AND IMPRESSION: Suggestion of subtle interstitial thickening within the periphery of the bilateral lung bases which may represent Kerley B-lines in the appropriate context. Findings also may be secondary to background chronic interstitial lung  disease. Correlation with patient history is recommended with follow-up chest CT if clinically indicated. No pneumothorax is seen. Cardiac silhouette within normal limits for size.  This report was finalized on 7/3/2024 11:31 AM by Dr. Oscar Buck M.D on Workstation: [id number]       Ambulatory status:   - Up with assist    Social issues:   Social History     Socioeconomic History    Marital status: Single   Tobacco Use    Smoking status: Every Day     Current packs/day: 0.25     Average packs/day: 0.3 packs/day for 25.0 years (6.3 ttl pk-yrs)     Types: Cigarettes    Smokeless tobacco: Never    Tobacco comments:      cont to wean  on6-8 cigs q  using vapor aid also   Substance and Sexual Activity    Alcohol use: Yes     Comment: social    Drug use: No       NIH Stroke Scale:        Nursing report ED to floor:

## 2024-07-03 NOTE — PROGRESS NOTES
"[facility] Clinical Pharmacy Services: Vancomycin Pharmacokinetic Initial Consult Note    Go Cho is a 59 y.o. male who is on day 1 of pharmacy to dose vancomycin.    Indication: Skin and Soft Tissue  Consulting Provider: Dr. Rios Castro  Planned Duration of Therapy: 7 days  Loading Dose Ordered: 2250 mg on 7/3 at 1430  Culture/Source:   7/3: Blood culture 2/2 in process  Target: -600 mg/L.hr   Pertinent Vanc Dosing History: N/A  Other Antimicrobials: Zosyn    Vitals/Labs  Ht: 177.8 cm (70\"); Wt: 108 kg (237 lb)  Temp Readings from Last 1 Encounters:   07/03/24 98 °F (36.7 °C) (Tympanic)    Estimated Creatinine Clearance: 132.4 mL/min (A) (by C-G formula based on SCr of 0.73 mg/dL (L)).     Results from last 7 days   Lab Units 07/03/24  1139   CREATININE mg/dL 0.73*   WBC 10*3/mm3 13.61*     Assessment/Plan:    Patient's renal function is at baseline. Will start a regimen of 1250 mg IV every 12 hours for a predicted AUC of 454 mg/L.hr.   Vanc Trough has been ordered for 7/5 at 1400.    Pharmacy will follow patient's kidney function and will adjust doses and obtain levels as necessary. Thank you for involving pharmacy in this patient's care. Please contact pharmacy with any questions or concerns.                           Joni Chau [facility]  Clinical Pharmacist   "

## 2024-07-03 NOTE — OP NOTE
Operative Note :  Ani Cummings MD      Go Cho  1965    Procedure Date: 07/03/24    Pre-op Diagnosis:  Soft tissue abscess of inguinal region [L02.214]    Post-Operative Diagnosis:  Soft tissue abscess of left inguinal region [L02.214]  Cellulitis of skin and soft tissues left inguinal region    Procedure:   Incision and drainage subcutaneous abscess left inguinal region    Surgeon: Ani Cummings MD    Assistant: None    Anesthesia:  General (general endotracheal tube)    Estimated Blood Loss: minimal    Specimens: Wound culture from left groin    Complications: None    Indications:  The patient is a 59-year-old gentleman who was admitted through the emergency room this evening with acute onset bilateral lower extremity edema and swelling as well as erythema and pain of the left inguinal region.  He was diagnosed with a cutaneous abscess and surrounding cellulitis of the left groin via CT in the ER.  I recommended going to the operating room tonight for incision and drainage of this under propofol sedation.  He just took Farxiga today, so the anesthesiology staff would like to do this under general anesthesia for airway protection given risk of aspiration.  He has been counseled on the risks of the procedure and has consented to proceed.    Findings: Cutaneous abscess of left groin filled with pasty, brown, thick fluid that was cultured and fully evacuated    Description of procedure:  The patient was brought to the operating room and intubated in supine position on the OR table.  General anesthesia was induced and the left groin was prepped and draped in sterile fashion.  A surgical timeout was completed.  The skin at the central most area of fluctuance was incised using a 15 blade scalpel, penetrating into the subcutaneous abscess where a moderate amount of thick, pasty, brown fluid evacuated.  A wound culture was obtained and sent to microbiology.  The cavity was completely evacuated,  probed, and deloculated.  The cavity was irrigated with 200 cc of sterile saline and blotted dry.  The abscess cavity was then packed with 1/4 inch iodoform gauze and covered with 4 x 4 and tape.  He was then extubated and transferred to PACU in stable condition with all counts correct per nursing.    Ani Cummings MD  General, Robotic, and Endoscopic Surgery  [facility]    [address], [address]  [place] [address]  P: [phone]  F: [phone]

## 2024-07-04 ENCOUNTER — APPOINTMENT (OUTPATIENT)
Dept: CT IMAGING | Facility: HOSPITAL | Age: 59
DRG: 603 | End: 2024-07-04
Payer: COMMERCIAL

## 2024-07-04 LAB
ANION GAP SERPL CALCULATED.3IONS-SCNC: 8.1 MMOL/L (ref 5–15)
BASOPHILS # BLD AUTO: 0.03 10*3/MM3 (ref 0–0.2)
BASOPHILS NFR BLD AUTO: 0.2 % (ref 0–1.5)
BUN SERPL-MCNC: 9 MG/DL (ref 8–23)
BUN/CREAT SERPL: 14.1 (ref 7–25)
CALCIUM SPEC-SCNC: 8.5 MG/DL (ref 8.6–10.5)
CHLORIDE SERPL-SCNC: 97 MMOL/L (ref 98–107)
CO2 SERPL-SCNC: 30.9 MMOL/L (ref 22–29)
CREAT SERPL-MCNC: 0.64 MG/DL (ref 0.76–1.27)
DEPRECATED RDW RBC AUTO: 41.8 FL (ref 37–54)
EGFRCR SERPLBLD CKD-EPI 2021: 108.4 ML/MIN/1.73
EOSINOPHIL # BLD AUTO: 0.01 10*3/MM3 (ref 0–0.4)
EOSINOPHIL NFR BLD AUTO: 0.1 % (ref 0.3–6.2)
ERYTHROCYTE [DISTWIDTH] IN BLOOD BY AUTOMATED COUNT: 12.3 % (ref 12.3–15.4)
GLUCOSE BLDC GLUCOMTR-MCNC: 133 MG/DL (ref 70–130)
GLUCOSE BLDC GLUCOMTR-MCNC: 148 MG/DL (ref 70–130)
GLUCOSE BLDC GLUCOMTR-MCNC: 152 MG/DL (ref 70–130)
GLUCOSE BLDC GLUCOMTR-MCNC: 173 MG/DL (ref 70–130)
GLUCOSE SERPL-MCNC: 220 MG/DL (ref 65–99)
HCT VFR BLD AUTO: 39.9 % (ref 37.5–51)
HGB BLD-MCNC: 12.8 G/DL (ref 13–17.7)
IMM GRANULOCYTES # BLD AUTO: 0.06 10*3/MM3 (ref 0–0.05)
IMM GRANULOCYTES NFR BLD AUTO: 0.4 % (ref 0–0.5)
LYMPHOCYTES # BLD AUTO: 0.86 10*3/MM3 (ref 0.7–3.1)
LYMPHOCYTES NFR BLD AUTO: 5.6 % (ref 19.6–45.3)
MAGNESIUM SERPL-MCNC: 2 MG/DL (ref 1.6–2.4)
MCH RBC QN AUTO: 29.8 PG (ref 26.6–33)
MCHC RBC AUTO-ENTMCNC: 32.1 G/DL (ref 31.5–35.7)
MCV RBC AUTO: 92.8 FL (ref 79–97)
MONOCYTES # BLD AUTO: 0.52 10*3/MM3 (ref 0.1–0.9)
MONOCYTES NFR BLD AUTO: 3.4 % (ref 5–12)
NEUTROPHILS NFR BLD AUTO: 13.76 10*3/MM3 (ref 1.7–7)
NEUTROPHILS NFR BLD AUTO: 90.3 % (ref 42.7–76)
NRBC BLD AUTO-RTO: 0 /100 WBC (ref 0–0.2)
PLATELET # BLD AUTO: 302 10*3/MM3 (ref 140–450)
PMV BLD AUTO: 9.1 FL (ref 6–12)
POTASSIUM SERPL-SCNC: 3.8 MMOL/L (ref 3.5–5.2)
RBC # BLD AUTO: 4.3 10*6/MM3 (ref 4.14–5.8)
SODIUM SERPL-SCNC: 136 MMOL/L (ref 136–145)
WBC NRBC COR # BLD AUTO: 15.24 10*3/MM3 (ref 3.4–10.8)

## 2024-07-04 PROCEDURE — 63710000001 INSULIN LISPRO (HUMAN) PER 5 UNITS: Performed by: INTERNAL MEDICINE

## 2024-07-04 PROCEDURE — 25810000003 SODIUM CHLORIDE 0.9 % SOLUTION 250 ML FLEX CONT: Performed by: SURGERY

## 2024-07-04 PROCEDURE — 82948 REAGENT STRIP/BLOOD GLUCOSE: CPT

## 2024-07-04 PROCEDURE — 99024 POSTOP FOLLOW-UP VISIT: CPT

## 2024-07-04 PROCEDURE — 83735 ASSAY OF MAGNESIUM: CPT | Performed by: INTERNAL MEDICINE

## 2024-07-04 PROCEDURE — 25010000002 VANCOMYCIN HCL 1.25 G RECONSTITUTED SOLUTION 1 EACH VIAL: Performed by: SURGERY

## 2024-07-04 PROCEDURE — 85025 COMPLETE CBC W/AUTO DIFF WBC: CPT | Performed by: SURGERY

## 2024-07-04 PROCEDURE — 25010000002 PIPERACILLIN SOD-TAZOBACTAM PER 1 G: Performed by: SURGERY

## 2024-07-04 PROCEDURE — 36415 COLL VENOUS BLD VENIPUNCTURE: CPT | Performed by: SURGERY

## 2024-07-04 PROCEDURE — 80048 BASIC METABOLIC PNL TOTAL CA: CPT | Performed by: SURGERY

## 2024-07-04 PROCEDURE — 71250 CT THORAX DX C-: CPT

## 2024-07-04 RX ORDER — INSULIN LISPRO 100 [IU]/ML
2-9 INJECTION, SOLUTION INTRAVENOUS; SUBCUTANEOUS
Status: DISCONTINUED | OUTPATIENT
Start: 2024-07-04 | End: 2024-07-05 | Stop reason: HOSPADM

## 2024-07-04 RX ADMIN — PIPERACILLIN AND TAZOBACTAM 3.38 G: 3; .375 INJECTION, POWDER, FOR SOLUTION INTRAVENOUS at 19:50

## 2024-07-04 RX ADMIN — SENNOSIDES AND DOCUSATE SODIUM 2 TABLET: 50; 8.6 TABLET ORAL at 09:39

## 2024-07-04 RX ADMIN — HYDROCODONE BITARTRATE AND ACETAMINOPHEN 1 TABLET: 7.5; 325 TABLET ORAL at 11:00

## 2024-07-04 RX ADMIN — PIPERACILLIN AND TAZOBACTAM 3.38 G: 3; .375 INJECTION, POWDER, FOR SOLUTION INTRAVENOUS at 05:23

## 2024-07-04 RX ADMIN — QUETIAPINE FUMARATE 100 MG: 100 TABLET ORAL at 23:37

## 2024-07-04 RX ADMIN — Medication 10 ML: at 21:28

## 2024-07-04 RX ADMIN — Medication 10 ML: at 09:39

## 2024-07-04 RX ADMIN — PIPERACILLIN AND TAZOBACTAM 3.38 G: 3; .375 INJECTION, POWDER, FOR SOLUTION INTRAVENOUS at 12:14

## 2024-07-04 RX ADMIN — VANCOMYCIN HYDROCHLORIDE 1250 MG: 1.25 INJECTION, POWDER, LYOPHILIZED, FOR SOLUTION INTRAVENOUS at 03:25

## 2024-07-04 RX ADMIN — INSULIN LISPRO 2 UNITS: 100 INJECTION, SOLUTION INTRAVENOUS; SUBCUTANEOUS at 12:14

## 2024-07-04 RX ADMIN — PANTOPRAZOLE SODIUM 40 MG: 40 TABLET, DELAYED RELEASE ORAL at 05:23

## 2024-07-04 RX ADMIN — LOSARTAN POTASSIUM 100 MG: 100 TABLET, FILM COATED ORAL at 09:38

## 2024-07-04 RX ADMIN — SENNOSIDES AND DOCUSATE SODIUM 2 TABLET: 50; 8.6 TABLET ORAL at 23:37

## 2024-07-04 RX ADMIN — NICOTINE 1 PATCH: 21 PATCH, EXTENDED RELEASE TRANSDERMAL at 13:12

## 2024-07-04 RX ADMIN — ASPIRIN 81 MG: 81 TABLET, COATED ORAL at 09:38

## 2024-07-04 RX ADMIN — INSULIN LISPRO 2 UNITS: 100 INJECTION, SOLUTION INTRAVENOUS; SUBCUTANEOUS at 21:07

## 2024-07-04 NOTE — CONSULTS
Cardiology Consult            PAST MEDICAL HISTORY       Active Ambulatory Problems     Diagnosis Date Noted    Hyponatremia 10/03/2016    Dyspnea 10/03/2016    Type 2 diabetes mellitus with hyperglycemia 10/03/2016    Tobacco abuse 10/03/2016    Leukocytosis 10/03/2016    Myoglobinuria 10/04/2016    Abnormal LFTs (liver function tests) 10/04/2016    Chronic bilateral low back pain without sciatica 08/18/2021    DDD (degenerative disc disease), lumbar 08/18/2021    Lumbar facet joint syndrome 08/18/2021    Bulge of lumbar disc without myelopathy 08/18/2021           Resolved Ambulatory Problems     Diagnosis Date Noted    Hypoxia 10/03/2016           Past Medical History:   Diagnosis Date    Diabetes mellitus      Hyperlipidemia      Hypertension      Sleep apnea              PAST SURGICAL HISTORY  Surgical History         Past Surgical History:   Procedure Laterality Date    COLONOSCOPY   02/01/2016     Howie Greer MD    UPPER GASTROINTESTINAL ENDOSCOPY   08/07/2015     Howie Greer MD               FAMILY HISTORY        Family History   Problem Relation Age of Onset    No Known Problems Mother      Heart disease Father              SOCIAL HISTORY  Social History   Social History            Socioeconomic History    Marital status: Single   Tobacco Use    Smoking status: Every Day       Current packs/day: 0.25       Average packs/day: 0.3 packs/day for 25.0 years (6.3 ttl pk-yrs)       Types: Cigarettes    Smokeless tobacco: Never    Tobacco comments:        cont to wean  on6-8 cigs q  using vapor aid also   Vaping Use    Vaping status: Some Days    Substances: Nicotine   Substance and Sexual Activity    Alcohol use: Yes       Comment: social    Drug use: No               ALLERGIES  Patient has no known allergies.           REVIEW OF SYSTEMS  Review of Systems      All systems reviewed and negative except for those discussed in HPI.         PHYSICAL EXAM     I have reviewed the triage vital signs and nursing  notes.            ED Triage Vitals [07/03/24 0955]   Temp Heart Rate Resp BP SpO2   98 °F (36.7 °C) 103 18 -- 96 %       Temp src Heart Rate Source Patient Position BP Location FiO2 (%)   Tympanic -- -- -- --         GENERAL: Male.  No acute vascular respiratory distress.Vital signs on my initial evaluation have been reviewed.  HENT: nares patent  Head/neck/ face are symmetric without gross deformity, signs of trauma, or swelling  EYES: no scleral icterus, no conjunctival pallor.  NECK: Supple, no meningismus  CV: regular rhythm, regular rate with intact distal pulses.  No murmur or rub.  Patient does have distal heart sounds  RESPIRATORY: normal effort and no respiratory distress.  Clear to auscultation bilaterally  ABDOMEN: soft and morbidly obese.  He does not have any obvious swelling to his abdomen.  To his left inguinal area he has some swelling some induration and redness and tenderness.  I do not feel any obvious fluctuance.  There is no purulent drainage.  MUSCULOSKELETAL: no deformity.  2+ edema to bilateral lower extremities that appear symmetric.  Start in the feet and then go up to just proximal to the knees.  There is some slight erythema noted to bilateral lower extremities at the feet and ankles.  There is a little warmth on palpation.  No crepitance.  No obvious fluctuance.  NEURO: alert and appropriate, moves all extremities, follows commands.  No focal motor or sensory changes  SKIN: warm, dry     Vital signs and nursing notes reviewed.           LAB RESULTS  Recent Results         Recent Results (from the past 24 hour(s))   ECG 12 Lead Other; New onset swelling to lower extremities     Collection Time: 07/03/24 11:07 AM   Result Value Ref Range     QT Interval 347 ms     QTC Interval 425 ms   Comprehensive Metabolic Panel     Collection Time: 07/03/24 11:39 AM     Specimen: Blood   Result Value Ref Range     Glucose 105 (H) 65 - 99 mg/dL     BUN 7 (L) 8 - 23 mg/dL     Creatinine 0.73 (L) 0.76 -  1.27 mg/dL     Sodium 134 (L) 136 - 145 mmol/L     Potassium 3.3 (L) 3.5 - 5.2 mmol/L     Chloride 91 (L) 98 - 107 mmol/L     CO2 30.8 (H) 22.0 - 29.0 mmol/L     Calcium 9.9 8.6 - 10.5 mg/dL     Total Protein 8.5 6.0 - 8.5 g/dL     Albumin 4.2 3.5 - 5.2 g/dL     ALT (SGPT) 29 1 - 41 U/L     AST (SGOT) 23 1 - 40 U/L     Alkaline Phosphatase 86 39 - 117 U/L     Total Bilirubin 0.6 0.0 - 1.2 mg/dL     Globulin 4.3 gm/dL     A/G Ratio 1.0 g/dL     BUN/Creatinine Ratio 9.6 7.0 - 25.0     Anion Gap 12.2 5.0 - 15.0 mmol/L     eGFR 104.2 >60.0 mL/min/1.73   Protime-INR     Collection Time: 07/03/24 11:39 AM     Specimen: Blood   Result Value Ref Range     Protime 14.9 (H) 11.7 - 14.2 Seconds     INR 1.15 (H) 0.90 - 1.10   Urinalysis With Microscopic If Indicated (No Culture) - Urine, Clean Catch     Collection Time: 07/03/24 11:39 AM     Specimen: Urine, Clean Catch   Result Value Ref Range     Color, UA Yellow Yellow, Straw     Appearance, UA Clear Clear     pH, UA 6.5 5.0 - 8.0     Specific Gravity, UA 1.016 1.005 - 1.030     Glucose, UA >=1000 mg/dL (3+) (A) Negative     Ketones, UA Negative Negative     Bilirubin, UA Negative Negative     Blood, UA Negative Negative     Protein, UA Negative Negative     Leuk Esterase, UA Negative Negative     Nitrite, UA Negative Negative     Urobilinogen, UA 1.0 E.U./dL 0.2 - 1.0 E.U./dL   Lipase     Collection Time: 07/03/24 11:39 AM     Specimen: Blood   Result Value Ref Range     Lipase 32 13 - 60 U/L   BNP     Collection Time: 07/03/24 11:39 AM     Specimen: Blood   Result Value Ref Range     proBNP 186.0 0.0 - 900.0 pg/mL   High Sensitivity Troponin T     Collection Time: 07/03/24 11:39 AM     Specimen: Blood   Result Value Ref Range     HS Troponin T 41 (H) <22 ng/L   Lactic Acid, Plasma     Collection Time: 07/03/24 11:39 AM     Specimen: Blood   Result Value Ref Range     Lactate 1.5 0.5 - 2.0 mmol/L   Procalcitonin     Collection Time: 07/03/24 11:39 AM     Specimen: Blood    Result Value Ref Range     Procalcitonin 0.12 0.00 - 0.25 ng/mL   Magnesium     Collection Time: 07/03/24 11:39 AM     Specimen: Blood   Result Value Ref Range     Magnesium 1.5 (L) 1.6 - 2.4 mg/dL   CBC Auto Differential     Collection Time: 07/03/24 11:39 AM     Specimen: Blood   Result Value Ref Range     WBC 13.61 (H) 3.40 - 10.80 10*3/mm3     RBC 4.68 4.14 - 5.80 10*6/mm3     Hemoglobin 14.1 13.0 - 17.7 g/dL     Hematocrit 42.4 37.5 - 51.0 %     MCV 90.6 79.0 - 97.0 fL     MCH 30.1 26.6 - 33.0 pg     MCHC 33.3 31.5 - 35.7 g/dL     RDW 12.4 12.3 - 15.4 %     RDW-SD 40.4 37.0 - 54.0 fl     MPV 9.0 6.0 - 12.0 fL     Platelets 318 140 - 450 10*3/mm3     Neutrophil % 80.2 (H) 42.7 - 76.0 %     Lymphocyte % 11.2 (L) 19.6 - 45.3 %     Monocyte % 6.8 5.0 - 12.0 %     Eosinophil % 0.9 0.3 - 6.2 %     Basophil % 0.4 0.0 - 1.5 %     Immature Grans % 0.5 0.0 - 0.5 %     Neutrophils, Absolute 10.92 (H) 1.70 - 7.00 10*3/mm3     Lymphocytes, Absolute 1.52 0.70 - 3.10 10*3/mm3     Monocytes, Absolute 0.92 (H) 0.10 - 0.90 10*3/mm3     Eosinophils, Absolute 0.12 0.00 - 0.40 10*3/mm3     Basophils, Absolute 0.06 0.00 - 0.20 10*3/mm3     Immature Grans, Absolute 0.07 (H) 0.00 - 0.05 10*3/mm3     nRBC 0.0 0.0 - 0.2 /100 WBC   High Sensitivity Troponin T 2Hr     Collection Time: 07/03/24  1:56 PM     Specimen: Blood   Result Value Ref Range     HS Troponin T 31 (H) <22 ng/L     Troponin T Delta -10 (L) >=-4 - <+4 ng/L            Ordered the above labs and independently reviewed the results.           RADIOLOGY  CT Abdomen Pelvis With Contrast     Result Date: 7/3/2024  CT OF THE ABDOMEN AND PELVIS WITH CONTRAST 07/03/2024  HISTORY: Lower extremity edema. Left groin tenderness.  Axial images were obtained from the lung bases to the symphysis pubis after intravenous contrast. No oral contrast was given.  There is mild fatty infiltration of the liver. The gallbladder, spleen, pancreas, adrenals and kidneys appear unremarkable.  There  is some aortoiliac calcification. Urinary bladder and prostate gland appear normal.  There are multiple bilateral inguinal nodes which are small to mildly enlarged and largest is seen on the left. The largest left inguinal node measures up to approximately 1.7 cm. There is injection of the subcutaneous fat in the left inguinal region. In addition there is an approximately 2.6 cm walled off low-density collection in the subcutaneous region of the left groin which resembles an abscess.  No other abnormal fluid collections are seen.  No bowel wall thickening or bowel dilatation is seen. There is colonic diverticulosis. Urinary bladder and prostate gland appear normal.       1. Inflammatory change/cellulitis in the left inguinal subcutaneous tissues. 2. There is a 2.6 cm walled off collection in the left inguinal region consistent with a small abscess. 3. Bilateral inguinal lymphadenopathy largest on the left as discussed in more detail above. 4. Fatty infiltration of the liver.  Radiation dose reduction techniques were utilized, including automated exposure control and exposure modulation based on body size.        XR Chest 1 View     Result Date: 7/3/2024  Portable chest radiograph  HISTORY: Peripheral edema  TECHNIQUE: Single AP portable radiograph of the chest  COMPARISON: Chest radiograph 10/2/2016       FINDINGS AND IMPRESSION: Suggestion of subtle interstitial thickening within the periphery of the bilateral lung bases which may represent Kerley B-lines in the appropriate context. Findings also may be secondary to background chronic interstitial lung disease. Correlation with patient history is recommended with follow-up chest CT if clinically indicated. No pneumothorax is seen. Cardiac silhouette within normal limits for size.  This report was finalized on 7/3/2024 11:31 AM by Dr. Oscar Buck M.D on Workstation: [id number]

## 2024-07-04 NOTE — PROGRESS NOTES
"[facility] Clinical Pharmacy Services: Vancomycin Monitoring Note    Go Cho is a 59 y.o. male who is on day 2/7 of pharmacy to dose vancomycin for Skin and Soft Tissue.    Previous Vancomycin Dose:    1250 mg IV every  12  hours  Updated Cultures and Sensitivities:   7/3 wound cx prelim gram negative bacilli   7/3 blood cx pending      Vitals/Labs  Ht: 172.7 cm (68\"); Wt: 101 kg (221 lb 12.8 oz)   Temp Readings from Last 1 Encounters:   07/03/24 97.6 °F (36.4 °C) (Oral)     Estimated Creatinine Clearance: 141.3 mL/min (A) (by C-G formula based on SCr of 0.64 mg/dL (L)).        Results from last 7 days   Lab Units 07/04/24  0149 07/03/24  1139   CREATININE mg/dL 0.64* 0.73*   WBC 10*3/mm3 15.24* 13.61*     Assessment/Plan    Current Vancomycin Dose: 1250 mg IV every  12  hours; provides a predicted  mg/L.hr   Next Level Date and Time: Vanc Trough on 7/5 at 1400  We will continue to monitor patient changes and renal function     Thank you for involving pharmacy in this patient's care. Please contact pharmacy with any questions or concerns.       Yesika Marquis, PharmD  Clinical Pharmacist          "

## 2024-07-04 NOTE — PLAN OF CARE
Goal Outcome Evaluation:      Pt had uneventful day. Went for a CT today. Dressing changed today, abx IV given. Safety maintained and vitals stable.

## 2024-07-04 NOTE — ANESTHESIA POSTPROCEDURE EVALUATION
"Patient: Go Cho    Procedure Summary       Date: 07/03/24 Room / Location: [facility] OR  / [facility] MAIN OR    Anesthesia Start: 1822 Anesthesia Stop: 1909    Procedure: INCISION AND DRAINAGE GROIN (Left: Breast) Diagnosis:       Soft tissue abscess of inguinal region      (Soft tissue abscess of inguinal region [L02.214])    Surgeons: Ani Cummings MD Provider: Jw Garcia MD    Anesthesia Type: general ASA Status: 3 - Emergent            Anesthesia Type: general    Vitals  Vitals Value Taken Time   /69 07/03/24 2000   Temp 37 °C (98.6 °F) 07/03/24 1905   Pulse 102 07/03/24 2002   Resp 16 07/03/24 2000   SpO2 91 % 07/03/24 2002   Vitals shown include unfiled device data.        Post Anesthesia Care and Evaluation    Patient location during evaluation: bedside  Patient participation: complete - patient participated  Level of consciousness: awake  Pain management: adequate    Airway patency: patent  Anesthetic complications: No anesthetic complications    Cardiovascular status: acceptable  Respiratory status: acceptable  Hydration status: acceptable    Comments: /69   Pulse 102   Temp 37 °C (98.6 °F) (Oral)   Resp 16   Ht 172.7 cm (68\")   Wt 108 kg (237 lb)   SpO2 92%   BMI 36.04 kg/m²     "

## 2024-07-04 NOTE — PROGRESS NOTES
" LOS: 1 day     Name: Go Cho  Age: 59 y.o.  Sex: male  :  1965  MRN: [id number]         Primary Care Physician: Priyanka Torres MD    Subjective   Subjective  Underwent incision and drainage of left inguinal abscess last evening.  He reports improvement of his lower extremity swelling.  Denies any shortness of breath or chest pain.  No fevers or chills.  States his groin is sore.    Objective   Vital Signs  Temp:  [97.6 °F (36.4 °C)-99.5 °F (37.5 °C)] 97.6 °F (36.4 °C)  Heart Rate:  [] 79  Resp:  [16-20] 18  BP: (126-170)/(65-94) 126/69  Body mass index is 33.72 kg/m².    Objective:  General Appearance:  Comfortable and in no acute distress.    Vital signs: (most recent): Blood pressure 126/69, pulse 79, temperature 97.6 °F (36.4 °C), temperature source Oral, resp. rate 18, height 172.7 cm (68\"), weight 101 kg (221 lb 12.8 oz), SpO2 94%.    Lungs:  Normal effort and normal respiratory rate.  (Crackles at the bilateral bases are improved compared to yesterday)  Heart: Normal rate.  Regular rhythm.    Abdomen: Abdomen is soft.  Bowel sounds are normal.   There is no abdominal tenderness.     Extremities: There is no dependent edema or local swelling.  (Little to no edema in the legs today)  Neurological: Patient is alert and oriented to person, place and time.    Skin:  Warm and dry.                Results Review:       I reviewed the patient's new clinical results.    Results from last 7 days   Lab Units 24  0149 24  1139   WBC 10*3/mm3 15.24* 13.61*   HEMOGLOBIN g/dL 12.8* 14.1   PLATELETS 10*3/mm3 302 318     Results from last 7 days   Lab Units 24  0149 24  1139   SODIUM mmol/L 136 134*   POTASSIUM mmol/L 3.8 3.3*   CHLORIDE mmol/L 97* 91*   CO2 mmol/L 30.9* 30.8*   BUN mg/dL 9 7*   CREATININE mg/dL 0.64* 0.73*   CALCIUM mg/dL 8.5* 9.9   GLUCOSE mg/dL 220* 105*     Results from last 7 days   Lab Units 24  1139   INR  1.15*             Scheduled Meds: "   aspirin, 81 mg, Oral, Daily  insulin lispro, 2-7 Units, Subcutaneous, 4x Daily AC & at Bedtime  losartan, 100 mg, Oral, Daily  nicotine, 1 patch, Transdermal, Q24H  pantoprazole, 40 mg, Oral, Q AM  piperacillin-tazobactam, 3.375 g, Intravenous, Q8H  QUEtiapine, 100 mg, Oral, Nightly  senna-docusate sodium, 2 tablet, Oral, BID  sodium chloride, 10 mL, Intravenous, Q12H  vancomycin, 1,250 mg, Intravenous, Q12H      PRN Meds:     senna-docusate sodium **AND** polyethylene glycol **AND** bisacodyl **AND** bisacodyl    dextrose    dextrose    glucagon (human recombinant)    HYDROcodone-acetaminophen    Magnesium Standard Dose Replacement - Follow Nurse / BPA Driven Protocol    nicotine polacrilex    nitroglycerin    ondansetron    Pharmacy to dose vancomycin    Potassium Replacement - Follow Nurse / BPA Driven Protocol    Potassium Replacement - Follow Nurse / BPA Driven Protocol    [COMPLETED] Insert Peripheral IV **AND** sodium chloride    sodium chloride    sodium chloride  Continuous Infusions:  Pharmacy to dose vancomycin,         Assessment & Plan   Active Hospital Problems    Diagnosis  POA    **Soft tissue abscess of inguinal region [L02.214]  Yes    Peripheral edema [R60.0]  Yes    Hypokalemia [E87.6]  Yes    Hyponatremia [E87.1]  Yes    Type 2 diabetes mellitus with hyperglycemia [E11.65]  Yes    Tobacco abuse [Z72.0]  Yes    Leukocytosis [D72.829]  Yes      Resolved Hospital Problems   No resolved problems to display.       Assessment & Plan    -POD 1 from left inguinal abscess I&D.  Wound culture growing gram-negative bacilli.  Continue spectrum antibiotics with Zosyn and vancomycin while awaiting additional culture information and trending the leukocytosis  -He reports improvement of his lower extremity edema after a dose of IV Lasix yesterday.  Crackles at the bilateral lower lung fields are also improved from yesterday.    -CT chest noted to show a subcentimeters pulmonary nodule which will require  3-month follow-up.  Pulmonary edema versus underlying ILD noted.  At present, favor the former given his improvement on exam today after Lasix.  -Chronic emphysematous change also seen on CT and he is trying to quit smoking.  -Echocardiogram is pending.  Troponin trended down after admission.  He reports a strong family cardiac history.  Cardiology has been asked to see.  -Blood pressure better controlled today and will continue present regimen  -Blood sugars elevated.  Increase sliding scale.  Check hemoglobin A1c        Expected Discharge Date: 7/5/2024; Expected Discharge Time:      Rios Castro MD  [facility]  07/04/24  11:34 EDT

## 2024-07-04 NOTE — PROGRESS NOTES
Acute Care General Surgery Progress Note    Patient: Go Cho  YOB: 1965  MRN: [id number]      Assessment  Go Cho is a 59 y.o. male who is POD 1 incision and drainage of Left inguinal abscess. Surrounding erythema improving. Incision site in good order with no purulent drainage noted. Blood cultures pending. Wound cultures pending with gram negative bacilli growing, currently receiving IV Zosyn and Vancomycin. AVSS.     Plan  Keep packing in place today, can change dry topper as needed.   Will unpack dressing tomorrow.   Continue IV antibiotics       Subjective  Denies nausea or vomiting. Pain controlled with medication.     Objective    Vitals:    07/04/24 0721   BP: 126/69   Pulse: 79   Resp: 18   Temp:    SpO2: 94%           Physical Exam  Constitutional: alert, oriented, in no acute distress  Respiratory: Normal work of breathing, Symmetric excursion  Cardiovascular: Well pefused, no jugular venous distention evident   Abdominal: soft, non-distended, non-tender  Skin: warm, dry, incision site is clean, intact, minimal drainage, no purulence noted from site, erythema improving      Laboratory Results  Results from last 7 days   Lab Units 07/04/24  0149 07/03/24  1139   WBC 10*3/mm3 15.24* 13.61*   HEMOGLOBIN g/dL 12.8* 14.1   HEMATOCRIT % 39.9 42.4   PLATELETS 10*3/mm3 302 318     Results from last 7 days   Lab Units 07/04/24  0149 07/03/24  1139   SODIUM mmol/L 136 134*   POTASSIUM mmol/L 3.8 3.3*   CHLORIDE mmol/L 97* 91*   CO2 mmol/L 30.9* 30.8*   BUN mg/dL 9 7*   CREATININE mg/dL 0.64* 0.73*   CALCIUM mg/dL 8.5* 9.9   BILIRUBIN mg/dL  --  0.6   ALK PHOS U/L  --  86   ALT (SGPT) U/L  --  29   AST (SGOT) U/L  --  23   GLUCOSE mg/dL 220* 105*     I have personally reviewed 7/4 labs         WARREN Hernandez  Acute Care General Surgery  [facility]    [address], [address]  [place], [place], [address]  P: [phone]  F: [phone]

## 2024-07-05 ENCOUNTER — APPOINTMENT (OUTPATIENT)
Dept: CARDIOLOGY | Facility: HOSPITAL | Age: 59
DRG: 603 | End: 2024-07-05
Payer: COMMERCIAL

## 2024-07-05 VITALS
RESPIRATION RATE: 18 BRPM | DIASTOLIC BLOOD PRESSURE: 75 MMHG | HEIGHT: 68 IN | TEMPERATURE: 97.9 F | SYSTOLIC BLOOD PRESSURE: 142 MMHG | WEIGHT: 221 LBS | HEART RATE: 69 BPM | BODY MASS INDEX: 33.49 KG/M2 | OXYGEN SATURATION: 92 %

## 2024-07-05 PROBLEM — E87.6 HYPOKALEMIA: Status: RESOLVED | Noted: 2024-07-03 | Resolved: 2024-07-05

## 2024-07-05 PROBLEM — R60.0 PERIPHERAL EDEMA: Status: RESOLVED | Noted: 2024-07-03 | Resolved: 2024-07-05

## 2024-07-05 LAB
ANION GAP SERPL CALCULATED.3IONS-SCNC: 7 MMOL/L (ref 5–15)
AORTIC ARCH: 2.3 CM
ASCENDING AORTA: 3 CM
BACTERIA SPEC AEROBE CULT: ABNORMAL
BH CV ECHO MEAS - ACS: 2.46 CM
BH CV ECHO MEAS - AO MAX PG: 6.6 MMHG
BH CV ECHO MEAS - AO MEAN PG: 3 MMHG
BH CV ECHO MEAS - AO ROOT DIAM: 3.5 CM
BH CV ECHO MEAS - AO V2 MAX: 128 CM/SEC
BH CV ECHO MEAS - AO V2 VTI: 26.2 CM
BH CV ECHO MEAS - AVA(I,D): 2.7 CM2
BH CV ECHO MEAS - EDV(CUBED): 95.4 ML
BH CV ECHO MEAS - EDV(MOD-SP2): 143 ML
BH CV ECHO MEAS - EDV(MOD-SP4): 165 ML
BH CV ECHO MEAS - EF(MOD-BP): 56 %
BH CV ECHO MEAS - EF(MOD-SP2): 56.6 %
BH CV ECHO MEAS - EF(MOD-SP4): 53.9 %
BH CV ECHO MEAS - ESV(CUBED): 28.3 ML
BH CV ECHO MEAS - ESV(MOD-SP2): 62 ML
BH CV ECHO MEAS - ESV(MOD-SP4): 76 ML
BH CV ECHO MEAS - FS: 33.3 %
BH CV ECHO MEAS - IVS/LVPW: 0.94 CM
BH CV ECHO MEAS - IVSD: 1.24 CM
BH CV ECHO MEAS - LAT PEAK E' VEL: 13.8 CM/SEC
BH CV ECHO MEAS - LV DIASTOLIC VOL/BSA (35-75): 74.5 CM2
BH CV ECHO MEAS - LV MASS(C)D: 222.5 GRAMS
BH CV ECHO MEAS - LV MAX PG: 3.8 MMHG
BH CV ECHO MEAS - LV MEAN PG: 2 MMHG
BH CV ECHO MEAS - LV SYSTOLIC VOL/BSA (12-30): 34.3 CM2
BH CV ECHO MEAS - LV V1 MAX: 97.9 CM/SEC
BH CV ECHO MEAS - LV V1 VTI: 18.1 CM
BH CV ECHO MEAS - LVIDD: 4.6 CM
BH CV ECHO MEAS - LVIDS: 3 CM
BH CV ECHO MEAS - LVOT AREA: 3.9 CM2
BH CV ECHO MEAS - LVOT DIAM: 2.23 CM
BH CV ECHO MEAS - LVPWD: 1.32 CM
BH CV ECHO MEAS - MED PEAK E' VEL: 6.7 CM/SEC
BH CV ECHO MEAS - MV A DUR: 0.08 SEC
BH CV ECHO MEAS - MV A MAX VEL: 84.5 CM/SEC
BH CV ECHO MEAS - MV DEC SLOPE: 856.7 CM/SEC2
BH CV ECHO MEAS - MV DEC TIME: 0.18 SEC
BH CV ECHO MEAS - MV E MAX VEL: 94.9 CM/SEC
BH CV ECHO MEAS - MV E/A: 1.12
BH CV ECHO MEAS - MV MAX PG: 5.5 MMHG
BH CV ECHO MEAS - MV MEAN PG: 3.2 MMHG
BH CV ECHO MEAS - MV P1/2T: 41.3 MSEC
BH CV ECHO MEAS - MV V2 VTI: 28.7 CM
BH CV ECHO MEAS - MVA(P1/2T): 5.3 CM2
BH CV ECHO MEAS - MVA(VTI): 2.45 CM2
BH CV ECHO MEAS - PA V2 MAX: 100.4 CM/SEC
BH CV ECHO MEAS - PI END-D VEL: 100.4 CM/SEC
BH CV ECHO MEAS - PULM A REVS DUR: 0.09 SEC
BH CV ECHO MEAS - PULM A REVS VEL: 30.5 CM/SEC
BH CV ECHO MEAS - PULM DIAS VEL: 44 CM/SEC
BH CV ECHO MEAS - PULM S/D: 1.05
BH CV ECHO MEAS - PULM SYS VEL: 46.2 CM/SEC
BH CV ECHO MEAS - QP/QS: 0.61
BH CV ECHO MEAS - RAP SYSTOLE: 3 MMHG
BH CV ECHO MEAS - RV MAX PG: 1.08 MMHG
BH CV ECHO MEAS - RV V1 MAX: 51.8 CM/SEC
BH CV ECHO MEAS - RV V1 VTI: 10.4 CM
BH CV ECHO MEAS - RVOT DIAM: 2.29 CM
BH CV ECHO MEAS - RVSP: 14.9 MMHG
BH CV ECHO MEAS - SUP REN AO DIAM: 1.9 CM
BH CV ECHO MEAS - SV(LVOT): 70.4 ML
BH CV ECHO MEAS - SV(MOD-SP2): 81 ML
BH CV ECHO MEAS - SV(MOD-SP4): 89 ML
BH CV ECHO MEAS - SV(RVOT): 42.8 ML
BH CV ECHO MEAS - SVI(LVOT): 31.8 ML/M2
BH CV ECHO MEAS - SVI(MOD-SP2): 36.6 ML/M2
BH CV ECHO MEAS - SVI(MOD-SP4): 40.2 ML/M2
BH CV ECHO MEAS - TAPSE (>1.6): 2.8 CM
BH CV ECHO MEAS - TR MAX PG: 11.9 MMHG
BH CV ECHO MEAS - TR MAX VEL: 172.4 CM/SEC
BH CV ECHO MEASUREMENTS AVERAGE E/E' RATIO: 9.26
BH CV XLRA - TDI S': 13.9 CM/SEC
BUN SERPL-MCNC: 8 MG/DL (ref 8–23)
BUN/CREAT SERPL: 11.8 (ref 7–25)
CALCIUM SPEC-SCNC: 8.1 MG/DL (ref 8.6–10.5)
CHLORIDE SERPL-SCNC: 97 MMOL/L (ref 98–107)
CO2 SERPL-SCNC: 31 MMOL/L (ref 22–29)
CREAT SERPL-MCNC: 0.68 MG/DL (ref 0.76–1.27)
DEPRECATED RDW RBC AUTO: 39.7 FL (ref 37–54)
EGFRCR SERPLBLD CKD-EPI 2021: 106.4 ML/MIN/1.73
ERYTHROCYTE [DISTWIDTH] IN BLOOD BY AUTOMATED COUNT: 11.9 % (ref 12.3–15.4)
GLUCOSE BLDC GLUCOMTR-MCNC: 121 MG/DL (ref 70–130)
GLUCOSE BLDC GLUCOMTR-MCNC: 124 MG/DL (ref 70–130)
GLUCOSE SERPL-MCNC: 160 MG/DL (ref 65–99)
GRAM STN SPEC: ABNORMAL
GRAM STN SPEC: ABNORMAL
HBA1C MFR BLD: 6.7 % (ref 4.8–5.6)
HCT VFR BLD AUTO: 36.3 % (ref 37.5–51)
HGB BLD-MCNC: 11.5 G/DL (ref 13–17.7)
LEFT ATRIUM VOLUME INDEX: 26.9 ML/M2
MCH RBC QN AUTO: 28.8 PG (ref 26.6–33)
MCHC RBC AUTO-ENTMCNC: 31.7 G/DL (ref 31.5–35.7)
MCV RBC AUTO: 91 FL (ref 79–97)
PLATELET # BLD AUTO: 319 10*3/MM3 (ref 140–450)
PMV BLD AUTO: 9.2 FL (ref 6–12)
POTASSIUM SERPL-SCNC: 3.6 MMOL/L (ref 3.5–5.2)
RBC # BLD AUTO: 3.99 10*6/MM3 (ref 4.14–5.8)
SINUS: 3.1 CM
SODIUM SERPL-SCNC: 135 MMOL/L (ref 136–145)
STJ: 2.9 CM
WBC NRBC COR # BLD AUTO: 10.78 10*3/MM3 (ref 3.4–10.8)

## 2024-07-05 PROCEDURE — 85027 COMPLETE CBC AUTOMATED: CPT | Performed by: INTERNAL MEDICINE

## 2024-07-05 PROCEDURE — 93306 TTE W/DOPPLER COMPLETE: CPT | Performed by: INTERNAL MEDICINE

## 2024-07-05 PROCEDURE — 99024 POSTOP FOLLOW-UP VISIT: CPT

## 2024-07-05 PROCEDURE — 82948 REAGENT STRIP/BLOOD GLUCOSE: CPT

## 2024-07-05 PROCEDURE — 93306 TTE W/DOPPLER COMPLETE: CPT

## 2024-07-05 PROCEDURE — 25510000001 PERFLUTREN (DEFINITY) 8.476 MG IN SODIUM CHLORIDE (PF) 0.9 % 10 ML INJECTION: Performed by: SURGERY

## 2024-07-05 PROCEDURE — 83036 HEMOGLOBIN GLYCOSYLATED A1C: CPT | Performed by: INTERNAL MEDICINE

## 2024-07-05 PROCEDURE — 25010000002 PIPERACILLIN SOD-TAZOBACTAM PER 1 G: Performed by: SURGERY

## 2024-07-05 PROCEDURE — 80048 BASIC METABOLIC PNL TOTAL CA: CPT | Performed by: INTERNAL MEDICINE

## 2024-07-05 PROCEDURE — 25510000001 PERFLUTREN 6.52 MG/ML SUSPENSION 2 ML VIAL: Performed by: SURGERY

## 2024-07-05 RX ORDER — FUROSEMIDE 40 MG/1
40 TABLET ORAL DAILY
Status: DISCONTINUED | OUTPATIENT
Start: 2024-07-05 | End: 2024-07-05 | Stop reason: HOSPADM

## 2024-07-05 RX ORDER — POTASSIUM CHLORIDE 750 MG/1
40 TABLET, FILM COATED, EXTENDED RELEASE ORAL EVERY 4 HOURS
Status: COMPLETED | OUTPATIENT
Start: 2024-07-05 | End: 2024-07-05

## 2024-07-05 RX ORDER — FUROSEMIDE 40 MG/1
40 TABLET ORAL DAILY
Qty: 30 TABLET | Refills: 3 | Status: SHIPPED | OUTPATIENT
Start: 2024-07-06

## 2024-07-05 RX ORDER — FERROUS SULFATE 325(65) MG
325 TABLET, DELAYED RELEASE (ENTERIC COATED) ORAL
Qty: 30 TABLET | Refills: 3 | Status: SHIPPED | OUTPATIENT
Start: 2024-07-05 | End: 2025-07-05

## 2024-07-05 RX ORDER — AMOXICILLIN AND CLAVULANATE POTASSIUM 875; 125 MG/1; MG/1
1 TABLET, FILM COATED ORAL 2 TIMES DAILY
Qty: 14 TABLET | Refills: 0 | Status: SHIPPED | OUTPATIENT
Start: 2024-07-05 | End: 2024-07-12

## 2024-07-05 RX ADMIN — PIPERACILLIN AND TAZOBACTAM 3.38 G: 3; .375 INJECTION, POWDER, FOR SOLUTION INTRAVENOUS at 04:00

## 2024-07-05 RX ADMIN — NICOTINE 1 PATCH: 21 PATCH, EXTENDED RELEASE TRANSDERMAL at 12:58

## 2024-07-05 RX ADMIN — PIPERACILLIN AND TAZOBACTAM 3.38 G: 3; .375 INJECTION, POWDER, FOR SOLUTION INTRAVENOUS at 11:42

## 2024-07-05 RX ADMIN — POTASSIUM CHLORIDE 40 MEQ: 750 TABLET, EXTENDED RELEASE ORAL at 10:07

## 2024-07-05 RX ADMIN — PANTOPRAZOLE SODIUM 40 MG: 40 TABLET, DELAYED RELEASE ORAL at 05:15

## 2024-07-05 RX ADMIN — LOSARTAN POTASSIUM 100 MG: 100 TABLET, FILM COATED ORAL at 10:07

## 2024-07-05 RX ADMIN — SENNOSIDES AND DOCUSATE SODIUM 2 TABLET: 50; 8.6 TABLET ORAL at 10:07

## 2024-07-05 RX ADMIN — PERFLUTREN 2 ML: 6.52 INJECTION, SUSPENSION INTRAVENOUS at 08:08

## 2024-07-05 RX ADMIN — ASPIRIN 81 MG: 81 TABLET, COATED ORAL at 10:07

## 2024-07-05 RX ADMIN — FUROSEMIDE 40 MG: 40 TABLET ORAL at 11:43

## 2024-07-05 RX ADMIN — Medication 10 ML: at 10:07

## 2024-07-05 RX ADMIN — POTASSIUM CHLORIDE 40 MEQ: 750 TABLET, EXTENDED RELEASE ORAL at 12:57

## 2024-07-05 NOTE — PLAN OF CARE
Goal Outcome Evaluation:           Progress: improving     Pt slept well, looking forward to going home soon.  Surgeon to change dressing in left groin.  A1c over 6 on am labs.  May need a refresher on Diabetes education.

## 2024-07-05 NOTE — DISCHARGE INSTRUCTIONS
Post I&D incision site:   Okay to shower.   No baths/pool/ submerging in water for 2 weeks  Clean site daily with soap and water, pat dry, place dry gauze over site  No ointments or lotions to site

## 2024-07-05 NOTE — DISCHARGE SUMMARY
Patient Name: Go Cho  : 1965  MRN: [id number]    Date of Admission: 7/3/2024  Date of Discharge:  2024  Primary Care Physician: Priyanka Torres MD      Discharge Diagnoses     Active Hospital Problems    Diagnosis  POA    **Soft tissue abscess of inguinal region [L02.214]  Yes    Type 2 diabetes mellitus with hyperglycemia [E11.65]  Yes    Tobacco abuse [Z72.0]  Yes      Resolved Hospital Problems    Diagnosis Date Resolved POA    Peripheral edema [R60.0] 2024 Yes    Hypokalemia [E87.6] 2024 Yes    Hyponatremia [E87.1] 2024 Yes    Leukocytosis [D72.829] 2024 Yes        Hospital Course     Brief admission history and physical.  Please refer to the H&P for full details.  A pleasant 67 years old gentleman with a past history of hypertension/DAVID/type 2 diabetes/dyslipidemia who presented to the emergency department with hip pain and swelling in the left groin associated with bilateral lower extremity edema more so on the left than the right and no other significant abnormalities.  His physical examination admission is remarkable for an afebrile patient with stable vital signs except a blood pressure of 170/94.  Rest of the examination is remarkable for an overweight gentleman/crackles bibasilar lungs/induration and swelling with erythema and hotness and over the left loin.  +2 bilateral lower extremity edema.  Hospital course.  Initial ER evaluation included a CBC that was normal except a white count of 13.6.  Magnesium was low at 1.5.  Procalcitonin and lactic acid were normal.  Blood cultures obtained.  Lipase was negative.  UA positive for glucose.  CMP normal except a random blood sugar of 105, BUN 7, creatinine 0.73, sodium 134, potassium 3.3, CO2 of 30.8, chloride 91.  Troponins were elevated but negative delta.  I will address hospital course and a problem oriented manner as below.  1.  Left inguinal abscess and cellulitis.  He was initiated on IV Zosyn.  Blood  cultures were obtained and they were negative by the time of discharge.  CT scan of the abdomen pelvis with IV contrast revealed cellulitis of the left inguinal region associated with a 2.6 cm collection suggestive of an abscess with bilateral leg minor lymphadenopathy more so on the left than the right with fatty liver.  A lower extremity venous ultrasound revealed chronic SVTs in both lower extremities but no acute DVT.  Surgery consult was obtained and the patient underwent incision and drainage.  Wound cultures eventually grew E. coli.  And the patient was switched from IV Zosyn to p.o. Augmentin for the next 7 days at the time of discharge.  Surgery released the patient for discharge with no need for packing of the wound but local care and dry gauze covering.  Follow-up with surgery in 2 weeks.  Leukocytosis has resolved during this admission.  2.  Volume overload and lower extremities edema/history of hypertension.  Lower extremity venous ultrasound was negative for DVT but positive for SVT.  2D echo with normal ejection fraction and grade 1 diastolic dysfunction and apical hypokinesis.  He was started on IV diuresis and subsequently switched to p.o. Lasix at the time of discharge.  Cardiology consult was obtained and they okayed the discharge.  There was no evidence of angina or congestive heart failure.  Blood pressure remained under good control with ARB's and Lasix.  Follow-up with cardiology in 1 month.  3.  Postoperative anemia.  Developed mild anemia postoperatively.  He was started on p.o. iron and at the time of discharge.  CBC needs to be followed up as an outpatient.  4.  Type 2 diabetes.  While in the hospital he was placed on sliding scale insulin.  A1c was 6.7.  At the time of discharge he was resumed back on his oral hypoglycemic agent and GLP-1.  Follow-up with primary MD for the diabetes.  5.  Hypokalemia and hypomagnesemia.  Both were substituted during this admission.  6.  Right upper lobe  pulmonary nodule/COPD changes by CAT scan..  CT scan of the chest during this admission showed vascular congestion and a right upper lobe nodule and chronic emphysematous changes.  Patient tells me that he has a pulmonary MD and he has had biopsy on this before and was negative.  He was counseled to follow-up with his pulmonologist.  Might benefit from bronchodilators and pulmonary function test    At the time of discharge patient was hemodynamically stable.  Patient being discharged home.  Low up with primary MD/pulmonary/cardiology/surgery  Consultants     Consult Orders (all) (From admission, onward)       Start     Ordered    07/03/24 1549  Inpatient Case Management  Consult  Once        Provider:  (Not yet assigned)    07/03/24 1548    07/03/24 1528  Inpatient Diabetes Educator Consult  Once,   Status:  Canceled        Provider:  (Not yet assigned)    07/03/24 1528    07/03/24 1337  Inpatient Cardiology Consult  Once        Specialty:  Cardiology  Provider:  Omid Mendiola MD    07/03/24 1337    07/03/24 1335  Inpatient General Surgery Consult  Once        Specialty:  General Surgery  Provider:  John Duran Jr., MD    07/03/24 1334    07/03/24 1229  [facility] (on-call MD unless specified) Details  Once        Specialty:  Hospitalist  Provider:  (Not yet assigned)    07/03/24 1228    07/03/24 1229  Surgery (on-call MD unless specified)  Once,   Status:  Canceled        Specialty:  General Surgery  Provider:  (Not yet assigned)    07/03/24 1228                  Procedures     Imaging Results (All)       Procedure Component Value Units Date/Time    CT Chest Without Contrast Diagnostic [[id number]] Collected: 07/04/24 0951     Updated: 07/04/24 1003    Narrative:      CT CHEST WO CONTRAST DIAGNOSTIC-     INDICATIONS: Pulmonary edema versus pulmonary fibrosis     TECHNIQUE: Radiation dose reduction techniques were utilized, including  automated exposure control and exposure modulation based on body  size.  Unenhanced CHEST CT     COMPARISON: 1/19/2021     FINDINGS:           The heart size is normal without pericardial effusion. Coronary arterial  calcification is present. Ascending aorta is mildly dilated, 3.7 cm, not  significantly changed. Numerous mediastinal lymph nodes are present,  similar to prior exam, predominantly subcentimeter short axis. A  subcarinal lymph node measures 1.2 cm short axis, nonspecific, but  appears stable from the prior exam. Assessment of vascular, hilar,  mediastinal structures is limited without intravenous contrast material.     The airways appear clear.     No pleural effusion or pneumothorax.     The lungs show no focal pulmonary consolidation or mass.  Scarring/atelectasis at the bases. Apical predominant emphysematous  changes appear stable. Faint groundglass opacities in the right  perihilar region could represent edema or may be related to underlying  interstitial lung disease. A 6 mm groundglass nodule in the right upper  lobe on axial image 44 is not seen on the prior exam, recommend 3 month  follow-up chest CT to characterize change. Small stable nodule  posteriorly in the right upper lobe on image 43 is noted. Atelectasis  may obscure a previously noted right lower lobe nodule, which is not  definitely seen on this exam. A left lower lobe nodule measuring 6-7 mm  on axial image 65 and is stable when remeasured on image 66 of the prior  exam.     Upper abdominal structures appear unremarkable.     Degenerative changes are seen in the spine. No acute fracture is  identified.       Impression:         A small new right upper lobe pulmonary nodule, follow-up recommended.  Chronic emphysematous changes. Faint groundglass opacities in the right  perihilar region could represent edema or may be related to underlying  interstitial lung disease.      This report was finalized on 7/4/2024 10:00 AM by Dr. Roger Fu M.D on Workstation: [id number]       CT Abdomen Pelvis  With Contrast [[id number]] Collected: 07/03/24 1220     Updated: 07/03/24 1708    Narrative:      CT OF THE ABDOMEN AND PELVIS WITH CONTRAST 07/03/2024     HISTORY: Lower extremity edema. Left groin tenderness.     Axial images were obtained from the lung bases to the symphysis pubis  after intravenous contrast. No oral contrast was given.     There is mild fatty infiltration of the liver. The gallbladder, spleen,  pancreas, adrenals and kidneys appear unremarkable.     There is some aortoiliac calcification. Urinary bladder and prostate  gland appear normal.     There are multiple bilateral inguinal nodes which are small to mildly  enlarged and largest is seen on the left. The largest left inguinal node  measures up to approximately 1.7 cm. There is injection of the  subcutaneous fat in the left inguinal region. In addition there is an  approximately 2.6 cm walled off low-density collection in the  subcutaneous region of the left groin which resembles an abscess.     No other abnormal fluid collections are seen.     No bowel wall thickening or bowel dilatation is seen. There is colonic  diverticulosis. Urinary bladder and prostate gland appear normal.       Impression:      1. Inflammatory change/cellulitis in the left inguinal subcutaneous  tissues.  2. There is a 2.6 cm walled off collection in the left inguinal region  consistent with a small abscess.  3. Bilateral inguinal lymphadenopathy largest on the left as discussed  in more detail above.  4. Fatty infiltration of the liver.     Radiation dose reduction techniques were utilized, including automated  exposure control and exposure modulation based on body size.        This report was finalized on 7/3/2024 5:05 PM by Dr. David Walker M.D on Workstation: [id number]       XR Chest 1 View [[id number]] Collected: 07/03/24 1126     Updated: 07/03/24 1134    Narrative:      Portable chest radiograph     HISTORY: Peripheral edema     TECHNIQUE: Single AP portable  "radiograph of the chest     COMPARISON: Chest radiograph 10/2/2016       Impression:      FINDINGS AND IMPRESSION:  Suggestion of subtle interstitial thickening within the periphery of the  bilateral lung bases which may represent Kerley B-lines in the  appropriate context. Findings also may be secondary to background  chronic interstitial lung disease. Correlation with patient history is  recommended with follow-up chest CT if clinically indicated. No  pneumothorax is seen. Cardiac silhouette within normal limits for size.     This report was finalized on 7/3/2024 11:31 AM by Dr. Oscar Buck M.D  on Workstation: [id number]               Pertinent Labs     Results from last 7 days   Lab Units 07/05/24  0332 07/04/24  0149 07/03/24  1139   WBC 10*3/mm3 10.78 15.24* 13.61*   HEMOGLOBIN g/dL 11.5* 12.8* 14.1   PLATELETS 10*3/mm3 319 302 318     Results from last 7 days   Lab Units 07/05/24  0332 07/04/24  0149 07/03/24  1139   SODIUM mmol/L 135* 136 134*   POTASSIUM mmol/L 3.6 3.8 3.3*   CHLORIDE mmol/L 97* 97* 91*   CO2 mmol/L 31.0* 30.9* 30.8*   BUN mg/dL 8 9 7*   CREATININE mg/dL 0.68* 0.64* 0.73*   GLUCOSE mg/dL 160* 220* 105*   Estimated Creatinine Clearance: 132.4 mL/min (A) (by C-G formula based on SCr of 0.68 mg/dL (L)).  Results from last 7 days   Lab Units 07/03/24  1139   ALBUMIN g/dL 4.2   BILIRUBIN mg/dL 0.6   ALK PHOS U/L 86   AST (SGOT) U/L 23   ALT (SGPT) U/L 29     Results from last 7 days   Lab Units 07/05/24  0332 07/04/24  0149 07/03/24  1139   CALCIUM mg/dL 8.1* 8.5* 9.9   ALBUMIN g/dL  --   --  4.2   MAGNESIUM mg/dL  --  2.0 1.5*     Results from last 7 days   Lab Units 07/03/24  1139   LIPASE U/L 32     Results from last 7 days   Lab Units 07/03/24  1356 07/03/24  1139   HSTROP T ng/L 31* 41*   PROBNP pg/mL  --  186.0           Invalid input(s): \"LDLCALC\"  Results from last 7 days   Lab Units 07/03/24  1836 07/03/24  1356 07/03/24  1139   BLOODCX   --  No growth at 24 hours No growth at 2 " days   WOUNDCX  Moderate growth (3+) Escherichia coli*  --   --      Imaging Results (Last 24 Hours)       ** No results found for the last 24 hours. **            Test Results Pending at Discharge     Pending Labs       Order Current Status    Anaerobic Culture - Wound, Groin, left In process    Blood Culture - Blood, Arm, Left Preliminary result    Blood Culture - Blood, Arm, Right Preliminary result              Discharge Exam   Physical Exam  Vitals.  Temperature 97.9 a pulse of 69 respirate rate of 18 blood pressure 142/75 and O2 sats of 92% with  General.  Middle-aged gentleman.  Obese.  Alert and oriented x 4.  No apparent pain/distress/diaphoresis.  Normal mood and affect.  Eyes.  Pupils equal round and reactive.  Intact extraocular musculature.  No pallor or jaundice.  Oral cavity.  Moist mucous membrane.  Neck.  Supple.  No JVD.  No lymphadenopathy or thyromegaly.  Cardiovascular.  Distant S1 and S2.  Regular rate and rhythm with no gallops or murmurs.  Chest.  Poor bilateral air entry with no added sounds.  Abdomen.  Soft lax.  No tenderness.  No organomegaly.  No guarding or rebound.  Left groin wound is dressed.  Extremities.  Trace bilateral lower extremity edema.  No clubbing or cyanosis.  CNS.  No acute focal neurological deficits.    Discharge Details        Discharge Medications        New Medications        Instructions Start Date   amoxicillin-clavulanate 875-125 MG per tablet  Commonly known as: AUGMENTIN   1 tablet, Oral, 2 Times Daily      ferrous sulfate 325 (65 FE) MG EC tablet   325 mg, Oral, Daily With Breakfast      furosemide 40 MG tablet  Commonly known as: LASIX   40 mg, Oral, Daily   Start Date: July 6, 2024            Changes to Medications        Instructions Start Date   metFORMIN  MG 24 hr tablet  Commonly known as: GLUCOPHAGE-XR  What changed: when to take this   TAKE 1 TABLET BY MOUTH THREE TIMES DAILY      sildenafil 100 MG tablet  Commonly known as: Viagra  What changed:  when to take this   100 mg, Oral, Daily PRN             Continue These Medications        Instructions Start Date   aspirin 81 MG EC tablet   81 mg, Oral, Daily      atorvastatin 10 MG tablet  Commonly known as: LIPITOR   10 mg, Oral, Daily      baclofen 10 MG tablet  Commonly known as: LIORESAL   10 mg, Oral, 3 Times Daily PRN      celecoxib 200 MG capsule  Commonly known as: CeleBREX   200 mg, Oral, Daily      Farxiga 10 MG tablet  Generic drug: dapagliflozin Propanediol   1 tablet, Oral, Daily      glimepiride 2 MG tablet  Commonly known as: AMARYL   2 mg, Oral, Every Morning Before Breakfast      HYDROcodone-acetaminophen 7.5-325 MG per tablet  Commonly known as: NORCO   1 tablet, Oral, Every 6 Hours PRN      losartan 100 MG tablet  Commonly known as: COZAAR   100 mg, Oral, Daily      omeprazole 20 MG capsule  Commonly known as: priLOSEC   20 mg, Oral, Daily      QUEtiapine 50 MG tablet  Commonly known as: SEROquel   100 mg, Oral, Nightly      Testosterone 1.62 % gel   Topical, Daily, APPLY 3 PUMPS TO THE APPROPRIATE AREA ONCE DAILY AS DIRECTED      vitamin D 1.25 MG (27378 UT) capsule capsule  Commonly known as: ERGOCALCIFEROL   1 capsule, Oral, Weekly, Saturdays              Stop These Medications      amLODIPine 10 MG tablet  Commonly known as: NORVASC     hydroCHLOROthiazide 12.5 MG tablet     lisinopril 10 MG tablet  Commonly known as: PRINIVIL,ZESTRIL     lisinopril 20 MG tablet  Commonly known as: PRINIVIL,ZESTRIL     triamcinolone 0.1 % cream  Commonly known as: KENALOG     Trulicity 0.75 MG/0.5ML solution pen-injector  Generic drug: Dulaglutide     Turmeric 500 MG capsule              No Known Allergies      Discharge Disposition:  Condition: Stable    Diet:   Diet Order   Procedures    Diet: Diabetic; Consistent Carbohydrate; Fluid Consistency: Thin (IDDSI 0)       Activity:   Activity Instructions       Activity as Tolerated              CODE STATUS:    Code Status and Medical Interventions:   Ordered  at: 07/03/24 1322     Code Status (Patient has no pulse and is not breathing):    CPR (Attempt to Resuscitate)     Medical Interventions (Patient has pulse or is breathing):    Full Support       No future appointments.  Additional Instructions for the Follow-ups that You Need to Schedule       Call MD With Problems / Concerns   As directed      Instructions: Call MD or return to ER if increasing pain/swelling/purulent discharge from left inguinal wound.  Fever and chills.  Chest pain or shortness of breath.  Lower extremity edema.  Swelling in the inguinal region.    Order Comments: Instructions: Call MD or return to ER if increasing pain/swelling/purulent discharge from left inguinal wound.  Fever and chills.  Chest pain or shortness of breath.  Lower extremity edema.  Swelling in the inguinal region.         Discharge Follow-up with PCP   As directed       Currently Documented PCP:    Priyanka Torres MD    PCP Phone Number:    [phone]     Follow Up Details: Primary MD.  1 week.  Left inguinal region cellulitis/abscess status post I&D/hypertension/lower extremity edema and volume overload/postoperative anemia/type 2 diabetes/electrolyte imbalance/pulmonary nodule        Discharge Follow-up with Specified Provider: Cardiology; 1 Month   As directed      To: Cardiology   Follow Up: 1 Month   Follow Up Details: Hypertension/volume overload/diastolic dysfunction/abnormal echo        Discharge Follow-up with Specified Provider: General surgery; 2 Weeks   As directed      To: General surgery   Follow Up: 2 Weeks   Follow Up Details: Left inguinal abscess and cellulitis status post incision and drainage        Discharge Follow-up with Specified Provider: Pulmonary.  As scheduled.   As directed      To: Pulmonary.  As scheduled.   Follow Up Details: Pulmonary nodule.               Follow-up Information       Ani Cummings MD Follow up.    Specialty: General Surgery  Why: Call soon to schedule an appointment  within the next few weeks.  Call sooner for any questions or concerns related to your left groin wound.  Contact information:  [address]  [facility] [address]  [place] [id number] [phone]               Priynaka Torres MD .    Specialty: Internal Medicine  Why: Primary MD.  1 week.  Left inguinal region cellulitis/abscess status post I&D/hypertension/lower extremity edema and volume overload/postoperative anemia/type 2 diabetes/electrolyte imbalance/pulmonary nodule  Contact information:  [address] [facility] [address] [phone]                               Time Spent on Discharge:  Greater than 30 minutes      Alessandra Middleton MD  [facility]  07/05/24  13:51 EDT

## 2024-07-05 NOTE — CASE MANAGEMENT/SOCIAL WORK
Continued Stay Note  [facility]     Patient Name: Go Cho  MRN: [id number]  Today's Date: 7/5/2024    Admit Date: 7/3/2024    Plan: Home, family to transport   Discharge Plan       Row Name 07/05/24 1427       Plan    Plan Home, family to transport    Plan Comments DC home, confirmed pt has no needs. Family to transport.                   Discharge Codes    No documentation.                 Expected Discharge Date and Time       Expected Discharge Date Expected Discharge Time    Jul 5, 2024               April Pérez RN

## 2024-07-05 NOTE — PROGRESS NOTES
Go CORONA Drumright   59 y.o.  male    LOS: 2 days   Patient Care Team:  Priyanka Torres MD as PCP - General (Internal Medicine)      Subjective   Patient denies any chest pain or dyspnea  Interval History:     Patient Complaints:     Review of Systems:       Medication Review:   Current Facility-Administered Medications:     aspirin EC tablet 81 mg, 81 mg, Oral, Daily, Ani Cummings MD, 81 mg at 07/05/24 1007    sennosides-docusate (PERICOLACE) 8.6-50 MG per tablet 2 tablet, 2 tablet, Oral, BID, 2 tablet at 07/05/24 1007 **AND** polyethylene glycol (MIRALAX) packet 17 g, 17 g, Oral, Daily PRN **AND** bisacodyl (DULCOLAX) EC tablet 5 mg, 5 mg, Oral, Daily PRN **AND** bisacodyl (DULCOLAX) suppository 10 mg, 10 mg, Rectal, Daily PRN, Ani Cummings MD    dextrose (D50W) (25 g/50 mL) IV injection 25 g, 25 g, Intravenous, Q15 Min PRN, Ani Cummings MD    dextrose (GLUTOSE) oral gel 15 g, 15 g, Oral, Q15 Min PRN, Ani Cummings MD    glucagon (GLUCAGEN) injection 1 mg, 1 mg, Intramuscular, Q15 Min PRN, Ani Cummings MD    HYDROcodone-acetaminophen (NORCO) 7.5-325 MG per tablet 1 tablet, 1 tablet, Oral, Q4H PRN, Ani Cummings MD, 1 tablet at 07/04/24 1100    insulin lispro (HUMALOG/ADMELOG) injection 2-9 Units, 2-9 Units, Subcutaneous, 4x Daily AC & at Bedtime, Rios Castro MD, 2 Units at 07/04/24 2107    losartan (COZAAR) tablet 100 mg, 100 mg, Oral, Daily, Ani Cummings MD, 100 mg at 07/05/24 1007    Magnesium Standard Dose Replacement - Follow Nurse / BPA Driven Protocol, , Does not apply, PRN, Ani Cummings MD    nicotine (NICODERM CQ) 21 MG/24HR patch 1 patch, 1 patch, Transdermal, Q24H, Ani Cummings MD, 1 patch at 07/04/24 1312    nicotine polacrilex (NICORETTE) gum 2 mg, 2 mg, Mouth/Throat, Q1H PRN, Ani Cummings MD    nitroglycerin (NITROSTAT) SL tablet 0.4 mg, 0.4 mg, Sublingual, Q5 Min PRN, Ani Cummings MD    ondansetron (ZOFRAN)  injection 4 mg, 4 mg, Intravenous, Q6H PRN, Ani Cummings MD    pantoprazole (PROTONIX) EC tablet 40 mg, 40 mg, Oral, Q AM, Ani Cummings MD, 40 mg at 07/05/24 0515    piperacillin-tazobactam (ZOSYN) 3.375 g IVPB in 100 mL NS MBP (CD), 3.375 g, Intravenous, Q8H, Ani Cummings MD, 3.375 g at 07/05/24 0400    potassium chloride (K-DUR,KLOR-CON) ER tablet 40 mEq, 40 mEq, Oral, Q4H, Rios Castro MD, 40 mEq at 07/05/24 1007    Potassium Replacement - Follow Nurse / BPA Driven Protocol, , Does not apply, PREmiliano HAND Danielle, MD    Potassium Replacement - Follow Nurse / BPA Driven Protocol, , Does not apply, Emiliano MIGUEL Danielle, MD    QUEtiapine (SEROquel) tablet 100 mg, 100 mg, Oral, Nightly, Ani Cummings MD, 100 mg at 07/04/24 2337    [COMPLETED] Insert Peripheral IV, , , Once **AND** sodium chloride 0.9 % flush 10 mL, 10 mL, Intravenous, PRN, Ani Cummings MD    sodium chloride 0.9 % flush 10 mL, 10 mL, Intravenous, Q12H, Ani Cummings MD, 10 mL at 07/05/24 1007    sodium chloride 0.9 % flush 10 mL, 10 mL, Intravenous, PRNEmiliano Danielle, MD    sodium chloride 0.9 % infusion 40 mL, 40 mL, Intravenous, PRN, Ani Cummings MD      Objective   Vital Sign Min/Max for last 24 hours  Temp  Min: 97.5 °F (36.4 °C)  Max: 98.2 °F (36.8 °C)   BP  Min: 130/70  Max: 158/71    Pulse  Min: 75  Max: 78     Wt Readings from Last 3 Encounters:   07/05/24 100 kg (221 lb)   08/18/21 124 kg (273 lb 9.6 oz)   06/22/21 120 kg (265 lb)        Intake/Output Summary (Last 24 hours) at 7/5/2024 1018  Last data filed at 7/4/2024 2300  Gross per 24 hour   Intake 4000 ml   Output 2850 ml   Net 1150 ml     Physical Exam:      General Appearance:    Well developed and well nourished in no acute distress   Head:    Normocephalic, atraumatic   Eyes:            Conjunctivae normal, anicteric, no xanthelasma   Neck:   supple, trachea midline, no thyromegaly, no carotid bruit, no JVD, no  "elevated CVP   Lungs:     Clear to auscultation,respirations regular, even and                unlabored    Heart:    Regular rhythm and normal rate, normal S1 and S2,            No murmur, no gallop, no rub, no click   Chest Wall:    No abnormalities observed   Abdomen:     Normal bowel sounds, soft, nondistended           Rectal:     Deferred   Extremities:   No edema. Moves all extremities well, no cyanosis, no erythema   Pulses:   Pulses palpable and equal bilaterally   Skin:   No bleeding, bruising or rash   Neurologic:   awake alert and oriented x3, speech clear and approp, no facial drooping     :    Monitor:      Results Review:         Sodium Sodium   Date Value Ref Range Status   07/05/2024 135 (L) 136 - 145 mmol/L Final   07/04/2024 136 136 - 145 mmol/L Final   07/03/2024 134 (L) 136 - 145 mmol/L Final      Potassium Potassium   Date Value Ref Range Status   07/05/2024 3.6 3.5 - 5.2 mmol/L Final   07/04/2024 3.8 3.5 - 5.2 mmol/L Final   07/03/2024 3.3 (L) 3.5 - 5.2 mmol/L Final     Comment:     Slight hemolysis detected by analyzer. Result may be falsely elevated.      Chloride Chloride   Date Value Ref Range Status   07/05/2024 97 (L) 98 - 107 mmol/L Final   07/04/2024 97 (L) 98 - 107 mmol/L Final   07/03/2024 91 (L) 98 - 107 mmol/L Final      Bicarbonate No results found for: \"PLASMABICARB\"   BUN BUN   Date Value Ref Range Status   07/05/2024 8 8 - 23 mg/dL Final   07/04/2024 9 8 - 23 mg/dL Final   07/03/2024 7 (L) 8 - 23 mg/dL Final      Creatinine Creatinine   Date Value Ref Range Status   07/05/2024 0.68 (L) 0.76 - 1.27 mg/dL Final   07/04/2024 0.64 (L) 0.76 - 1.27 mg/dL Final   07/03/2024 0.73 (L) 0.76 - 1.27 mg/dL Final      Calcium Calcium   Date Value Ref Range Status   07/05/2024 8.1 (L) 8.6 - 10.5 mg/dL Final   07/04/2024 8.5 (L) 8.6 - 10.5 mg/dL Final   07/03/2024 9.9 8.6 - 10.5 mg/dL Final      Magnesium Magnesium   Date Value Ref Range Status   07/04/2024 2.0 1.6 - 2.4 mg/dL Final " "  07/03/2024 1.5 (L) 1.6 - 2.4 mg/dL Final        Results from last 7 days   Lab Units 07/05/24  0332   WBC 10*3/mm3 10.78   HEMOGLOBIN g/dL 11.5*   HEMATOCRIT % 36.3*   PLATELETS 10*3/mm3 319     Lab Results   Lab Value Date/Time    TROPONINT 31 (H) 07/03/2024 1356    TROPONINT 41 (H) 07/03/2024 1139    TROPONINT <0.010 10/03/2016 0800    TROPONINT <0.010 10/02/2016 2338            Echo EF Estimated  No results found for: \"ECHOEFEST\"  Echocardiogram Findings    Left Ventricle Normal left ventricular cavity size and wall thickness noted. All left ventricular wall segments contract normally.   Right Ventricle Normal right ventricular cavity size, wall thickness, systolic function and septal motion noted.   Left Atrium Normal left atrial size and volume noted.   Right Atrium Normal right atrial cavity size noted.   Aortic Valve The aortic valve is structurally normal with no regurgitation or stenosis present.   Mitral Valve The mitral valve is structurally normal with no regurgitation or significant stenosis present.   Tricuspid Valve The tricuspid valve is structurally normal with no significant regurgitation or significant stenosis present. Estimated right ventricular systolic pressure from tricuspid regurgitation is normal (<35 mmHg).   Pulmonic Valve The pulmonic valve is structurally normal with no regurgitation or significant stenosis present.   Greater Vessels No dilation of the aortic root is present. The inferior vena cava is normally sized. Partial IVC inspiratory collapse of less than 50% noted.   Pericardium The pericardium is normal. There is no evidence of pericardial effusion. .       Assessment/ Plan  Assessment & Plan   Active Hospital Problems    Diagnosis  POA    **Soft tissue abscess of inguinal region [L02.214]  Yes    Peripheral edema [R60.0]  Yes    Hypokalemia [E87.6]  Yes    Hyponatremia [E87.1]  Yes    Type 2 diabetes mellitus with hyperglycemia [E11.65]  Yes    Tobacco abuse [Z72.0]  Yes    " Leukocytosis [D72.829]  Yes     Status post I&D of inguinal hernia abscess  Will start him on a Lasix 40 mg daily  Discharge plans per primary  We will sign off        Elena Iverson MD  07/05/24  10:18 EDT

## 2024-07-05 NOTE — PROGRESS NOTES
Acute Care General Surgery Progress Note    Patient: Go Cho  YOB: 1965  MRN: [id number]      Assessment  Go Cho is a 59 y.o. male who is POD 2 incision and drainage of left inguinal abscess. Packing removed today, site is clean with minimal serosanguinous drainage no purulence noted. Wound cultures positive for E.coli. WBC has normalized today. AVSS.     Plan  Okay for discharge home today from general surgery standpoint  Will need Augmentin for 7 more days.   Will need to follow up with Dr. Cummings in 2 weeks  Okay to shower, no baths/pools/submerging in water   Clean area with soap and water daily, apply dry gauze over incision site, no lotions or ointments to site      Subjective  Denies nausea, vomiting, or diarrhea. Pain controlled with medication. Denies fever or chills.     Objective    Vitals:    07/05/24 0843   BP: 158/71   Pulse: 75   Resp: 18   Temp: 97.9 °F (36.6 °C)   SpO2: 92%          Physical Exam  Constitutional: alert, oriented, in no acute distress  Respiratory: Normal work of breathing, Symmetric excursion  Cardiovascular: Well pefused, no jugular venous distention evident   Abdominal: soft, non-distended, nontender  Skin: warm, dry, incision site with no purulent drainage, scant serosanguinous drainage      Laboratory Results  Results from last 7 days   Lab Units 07/05/24  0332 07/04/24  0149 07/03/24  1139   WBC 10*3/mm3 10.78 15.24* 13.61*   HEMOGLOBIN g/dL 11.5* 12.8* 14.1   HEMATOCRIT % 36.3* 39.9 42.4   PLATELETS 10*3/mm3 319 302 318     Results from last 7 days   Lab Units 07/05/24  0332 07/04/24  0149 07/03/24  1139   SODIUM mmol/L 135* 136 134*   POTASSIUM mmol/L 3.6 3.8 3.3*   CHLORIDE mmol/L 97* 97* 91*   CO2 mmol/L 31.0* 30.9* 30.8*   BUN mg/dL 8 9 7*   CREATININE mg/dL 0.68* 0.64* 0.73*   CALCIUM mg/dL 8.1* 8.5* 9.9   BILIRUBIN mg/dL  --   --  0.6   ALK PHOS U/L  --   --  86   ALT (SGPT) U/L  --   --  29   AST (SGOT) U/L  --   --  23   GLUCOSE  mg/dL 160* 220* 105*     I have personally reviewed 7/5 labs        WARREN Hernandez  Acute Care General Surgery  [facility]    [address], [address]  [place], [place], [address]  P: [phone]  F: [phone]

## 2024-07-05 NOTE — CASE MANAGEMENT/SOCIAL WORK
Case Management Discharge Note      Final Note: Home, family to transport         Selected Continued Care - Discharged on 7/5/2024 Admission date: 7/3/2024 - Discharge disposition: Home or Self Care      Destination    No services have been selected for the patient.                Durable Medical Equipment    No services have been selected for the patient.                Dialysis/Infusion    No services have been selected for the patient.                Home Medical Care    No services have been selected for the patient.                Therapy    No services have been selected for the patient.                Community Resources    No services have been selected for the patient.                Community & DME    No services have been selected for the patient.                    Transportation Services  Private: Car    Final Discharge Disposition Code: 01 - home or self-care

## 2024-07-05 NOTE — PAYOR COMM NOTE
"Gail Fernandez (59 y.o. Male)        PLEASE SEE ATTACHED FOR INPT AUTH.     REF#[id number]    PLEASE CALL  OR  [phone]    THANK YOU    SUSANNE DEJESUS LPN [facility]   Date of Birth   1965    Social Security Number       Address   [address] [address]    Home Phone   [phone]    MRN   [id number]       Zoroastrian   None    Marital Status   Single                            Admission Date   7/3/24    Admission Type   Emergency    Admitting Provider   Rios Castro MD    Attending Provider   Alessandra Middleton MD    Department, Room/Bed   [address], N436/1       Discharge Date       Discharge Disposition       Discharge Destination                                 Attending Provider: Alessandra Middleton MD    Allergies: No Known Allergies    Isolation: None   Infection: None   Code Status: CPR    Ht: 172.7 cm (68\")   Wt: 100 kg (221 lb)    Admission Cmt: None   Principal Problem: Soft tissue abscess of inguinal region [L02.214]                   Active Insurance as of 7/3/2024       Primary Coverage       Payor Plan Insurance Group Employer/Plan Group    [facility] BLUE CROSS [employer] EMPL[employer]EE [id number]       Payor Plan Address Payor Plan Phone Number Payor Plan Fax Number Effective Dates    [phone]  1/1/2015 - None Entered    [place], [place] [address]         Subscriber Name Subscriber Birth Date Member ID       GAIL FERNANDEZ 1965 [id number]                     Emergency Contacts        (Rel.) Home Phone Work Phone Mobile Phone    Eleanor Kennedy (Mother) [phone] -- --              [place]: [facility] [id number]  Tax ID [id number]     History & Physical        Rios Castro MD at 07/03/24 1323          [facility] Admission H&P    Patient Care Team:  Priyanka Torres MD as PCP - General (Internal Medicine)    Chief complaint: Pain and swelling in left groin, lower extremity " swelling    History of Present Illness    This is a very pleasant 59-year-old male who has a history of hypertension, DAVID, diabetes, hyperlipidemia.  He presented to the emergency room with above-stated complaints.  He states that his pain in the left groin started as what he thought was an ingrown hair a couple of days ago.  The groin began to swell and become tender and warm to the touch.  Workup in the emergency room revealed inguinal cellulitis and a small abscess.  He denies any fevers or chills.  He also has noted bilateral lower extremity edema which has been progressive over the past 4 to 5 weeks.  He was placed on HCTZ by his primary care physician a couple of days ago but this has not helped.  He denies any shortness of breath.  States he gets somewhat winded if he speaks in too long of sentences.  Denies orthopnea but does sleep on 3 pillows chronically.  Denies any weight gain, abdominal distention.  He denies chest pain or palpitations.  No cough or wheezing.    Past Medical History:   Diagnosis Date    Diabetes mellitus     Hyperlipidemia     Hypertension     Sleep apnea      Past Surgical History:   Procedure Laterality Date    COLONOSCOPY  02/01/2016    Howie Greer MD    UPPER GASTROINTESTINAL ENDOSCOPY  08/07/2015    Howie Greer MD     Family History   Problem Relation Age of Onset    No Known Problems Mother     Heart disease Father      Social History     Tobacco Use    Smoking status: Every Day     Current packs/day: 0.25     Average packs/day: 0.3 packs/day for 25.0 years (6.3 ttl pk-yrs)     Types: Cigarettes    Smokeless tobacco: Never    Tobacco comments:      cont to wean  on6-8 cigs q  using vapor aid also   Substance Use Topics    Alcohol use: Yes     Comment: social    Drug use: No     Medications reviewed    Allergies:  Patient has no known allergies.    Review of Systems   Constitutional:  Negative for chills, fatigue and fever.   HENT:  Negative for congestion, sore throat and  trouble swallowing.    Eyes:  Negative for visual disturbance.   Respiratory:  Negative for cough, chest tightness and shortness of breath.    Cardiovascular:  Positive for leg swelling. Negative for chest pain and palpitations.   Gastrointestinal:  Negative for abdominal pain, blood in stool, constipation, diarrhea, nausea and vomiting.        Pain and swelling in the left groin   Endocrine: Negative for polydipsia and polyuria.   Genitourinary:  Negative for difficulty urinating, dysuria, frequency, hematuria and urgency.   Musculoskeletal:  Negative for arthralgias, joint swelling and myalgias.   Skin:  Negative for rash and wound.   Neurological:  Negative for dizziness, weakness, light-headedness and numbness.        PHYSICAL EXAM    Vital Signs  tMax 24 hrs:  Temp (24hrs), Av °F (36.7 °C), Min:98 °F (36.7 °C), Max:98 °F (36.7 °C)    Vitals Ranges:  Temp:  [98 °F (36.7 °C)] 98 °F (36.7 °C)  Heart Rate:  [] 86  Resp:  [18] 18  BP: (164-170)/(87-94) 170/94    Physical Exam  Vitals and nursing note reviewed.   Constitutional:       Appearance: He is well-developed.   HENT:      Head: Normocephalic and atraumatic.   Eyes:      Pupils: Pupils are equal, round, and reactive to light.   Neck:      Trachea: No tracheal deviation.   Cardiovascular:      Rate and Rhythm: Normal rate and regular rhythm.      Heart sounds: No murmur heard.     No gallop.   Pulmonary:      Effort: Pulmonary effort is normal. No respiratory distress.      Breath sounds: No wheezing.      Comments: Crackles at the bilateral bases  Abdominal:      General: Bowel sounds are normal. There is no distension.      Palpations: Abdomen is soft.      Tenderness: There is no abdominal tenderness.   Genitourinary:     Comments: He has swelling with induration and erythema/warmth to the left inguinal region.  The area is tender  Musculoskeletal:         General: No tenderness.      Cervical back: Neck supple.      Right lower leg: Edema  present.      Left lower leg: Edema present.   Skin:     General: Skin is warm and dry.   Neurological:      Mental Status: He is alert and oriented to person, place, and time.      Cranial Nerves: No cranial nerve deficit.         Results Review:  Results from last 7 days   Lab Units 07/03/24  1139   WBC 10*3/mm3 13.61*   HEMOGLOBIN g/dL 14.1   HEMATOCRIT % 42.4   PLATELETS 10*3/mm3 318     Results from last 7 days   Lab Units 07/03/24  1139   SODIUM mmol/L 134*   POTASSIUM mmol/L 3.3*   CHLORIDE mmol/L 91*   CO2 mmol/L 30.8*   BUN mg/dL 7*   CREATININE mg/dL 0.73*   CALCIUM mg/dL 9.9   BILIRUBIN mg/dL 0.6   ALK PHOS U/L 86   ALT (SGPT) U/L 29   AST (SGOT) U/L 23   GLUCOSE mg/dL 105*        I reviewed the patient's new clinical results.  I reviewed the patient's new imaging results and agree with the interpretation.  I personally viewed and interpreted the patient's EKG/Telemetry data        Active Hospital Problems    Diagnosis  POA    **Peripheral edema [R60.0]  Yes    Soft tissue abscess of inguinal region [L02.214]  Unknown    Hypokalemia [E87.6]  Unknown    Hyponatremia [E87.1]  Yes    Type 2 diabetes mellitus with hyperglycemia [E11.65]  Yes    Tobacco abuse [Z72.0]  Yes    Leukocytosis [D72.829]  Yes      Resolved Hospital Problems   No resolved problems to display.       Assessment & Plan    The patient is going to be admitted for further treatment and evaluation of his left inguinal cellulitis with abscess.  Will place him on broad-spectrum antibiotics.  Blood cultures have been obtained.  General surgery has been consulted.  Will keep him n.p.o. until they evaluate.    With regards to his progressive lower extremity edema over the past 4 to 5 weeks, I will check an echocardiogram and lower extremity Doppler.  He has some crackles on exam and I will give him a dose of IV Lasix.  He is presently on 4 L of oxygen with saturation of 96% but denies shortness of breath.  I will check a CT scan of the chest.   BNP is unremarkable at 186.  His troponin is slightly elevated at 41 and will begin to trend this.  He denies any chest pain.  I will go ahead and ask cardiology to evaluate him.  He has a family history of CAD.    Will replace potassium and magnesium    Start sliding scale insulin to temporize blood sugars.  Currently these appear well-controlled.    Additional plans based on his clinical course.    I discussed the patients findings and my recommendations with patient      Rios Castro MD  07/03/24  13:23 EDT              Electronically signed by Rios Castro MD at 07/03/24 1331          Emergency Department Notes        Anna Higginbotham, RN at 07/03/24 1425          Report given to TYSON Lopez with 4N    Electronically signed by Anna Higginbotham RN at 07/03/24 1425       Molly Sahni RN at 07/03/24 1316          Nursing report ED to floor  Go Cho  59 y.o.  male    HPI :   Chief Complaint   Patient presents with    Foot Swelling    Groin Swelling       Admitting doctor:   Rios Castro MD    Admitting diagnosis:   The primary encounter diagnosis was Peripheral edema, bilateral lower extremities. Diagnoses of Cellulitis of other specified site: Cellulitis and abscess of left inguinal region, Troponin level elevated, Hypomagnesemia, and Hypokalemia were also pertinent to this visit.    Code status:   Current Code Status       Date Active Code Status Order ID Comments User Context       Prior            Allergies:   Patient has no known allergies.    Intake and Output  No intake or output data in the 24 hours ending 07/03/24 1316    Weight:       07/03/24  0955   Weight: 108 kg (237 lb)       Most recent vitals:   Vitals:    07/03/24 0955 07/03/24 1141 07/03/24 1300 07/03/24 1301   BP:  164/87  170/94   Pulse: 103  84 86   Resp: 18      Temp: 98 °F (36.7 °C)      TempSrc: Tympanic      SpO2: 96%  96% 99%   Weight: 108 kg (237 lb)      Height: 177.8 cm  "(70\")          Active LDAs/IV Access:   Lines, Drains & Airways       Active LDAs       Name Placement date Placement time Site Days    Peripheral IV 07/03/24 1145 Right Antecubital 07/03/24  1145  Antecubital  less than 1                    Labs (abnormal labs have a star):   Labs Reviewed   COMPREHENSIVE METABOLIC PANEL - Abnormal; Notable for the following components:       Result Value    Glucose 105 (*)     BUN 7 (*)     Creatinine 0.73 (*)     Sodium 134 (*)     Potassium 3.3 (*)     Chloride 91 (*)     CO2 30.8 (*)     All other components within normal limits    Narrative:     GFR Normal >60  Chronic Kidney Disease <60  Kidney Failure <15     PROTIME-INR - Abnormal; Notable for the following components:    Protime 14.9 (*)     INR 1.15 (*)     All other components within normal limits   URINALYSIS W/ MICROSCOPIC IF INDICATED (NO CULTURE) - Abnormal; Notable for the following components:    Glucose, UA >=1000 mg/dL (3+) (*)     All other components within normal limits    Narrative:     Urine microscopic not indicated.   TROPONIN - Abnormal; Notable for the following components:    HS Troponin T 41 (*)     All other components within normal limits    Narrative:     High Sensitive Troponin T Reference Range:  <14.0 ng/L- Negative Female for AMI  <22.0 ng/L- Negative Male for AMI  >=14 - Abnormal Female indicating possible myocardial injury.  >=22 - Abnormal Male indicating possible myocardial injury.   Clinicians would have to utilize clinical acumen, EKG, Troponin, and serial changes to determine if it is an Acute Myocardial Infarction or myocardial injury due to an underlying chronic condition.        MAGNESIUM - Abnormal; Notable for the following components:    Magnesium 1.5 (*)     All other components within normal limits   CBC WITH AUTO DIFFERENTIAL - Abnormal; Notable for the following components:    WBC 13.61 (*)     Neutrophil % 80.2 (*)     Lymphocyte % 11.2 (*)     Neutrophils, Absolute 10.92 (*)  " "   Monocytes, Absolute 0.92 (*)     Immature Grans, Absolute 0.07 (*)     All other components within normal limits   LIPASE - Normal   BNP (IN-HOUSE) - Normal    Narrative:     This assay is used as an aid in the diagnosis of individuals suspected of having heart failure. It can be used as an aid in the diagnosis of acute decompensated heart failure (ADHF) in patients presenting with signs and symptoms of ADHF to the emergency department (ED). In addition, NT-proBNP of <300 pg/mL indicates ADHF is not likely.    Age Range Result Interpretation  NT-proBNP Concentration (pg/mL:      <50             Positive            >450                   Gray                 300-450                    Negative             <300    50-75           Positive            >900                  Gray                300-900                  Negative            <300      >75             Positive            >1800                  Gray                300-1800                  Negative            <300   LACTIC ACID, PLASMA - Normal   PROCALCITONIN - Normal    Narrative:     As a Marker for Sepsis (Non-Neonates):    1. <0.5 ng/mL represents a low risk of severe sepsis and/or septic shock.  2. >2 ng/mL represents a high risk of severe sepsis and/or septic shock.    As a Marker for Lower Respiratory Tract Infections that require antibiotic therapy:    PCT on Admission    Antibiotic Therapy       6-12 Hrs later    >0.5                Strongly Recommended  >0.25 - <0.5        Recommended   0.1 - 0.25          Discouraged              Remeasure/reassess PCT  <0.1                Strongly Discouraged     Remeasure/reassess PCT    As 28 day mortality risk marker: \"Change in Procalcitonin Result\" (>80% or <=80%) if Day 0 (or Day 1) and Day 4 values are available. Refer to http://www.[email or web address]-pct-calculator.com    Change in PCT <=80%  A decrease of PCT levels below or equal to 80% defines a positive change in PCT test result representing a higher risk for " 28-day all-cause mortality of patients diagnosed with severe sepsis for septic shock.    Change in PCT >80%  A decrease of PCT levels of more than 80% defines a negative change in PCT result representing a lower risk for 28-day all-cause mortality of patients diagnosed with severe sepsis or septic shock.      BLOOD CULTURE   BLOOD CULTURE   HIGH SENSITIVITIY TROPONIN T 2HR   CBC AND DIFFERENTIAL    Narrative:     The following orders were created for panel order CBC & Differential.  Procedure                               Abnormality         Status                     ---------                               -----------         ------                     CBC Auto Differential[[id number]]        Abnormal            Final result                 Please view results for these tests on the individual orders.       EKG:   ECG 12 Lead Other; New onset swelling to lower extremities   Preliminary Result   HEART RATE=90  bpm   RR Rbljqiqh=324  ms   SC Shxtalct=204  ms   P Horizontal Axis=-24  deg   P Front Axis=31  deg   QRSD Interval=97  ms   QT Mrxzeczp=964  ms   FLyB=533  ms   QRS Axis=-11  deg   T Wave Axis=62  deg   - ABNORMAL ECG -   Sinus rhythm   Probable left atrial enlargement   Low voltage, precordial leads   ST elevation, consider inferior injury   Date and Time of Study:2024-07-03 11:07:54          Meds given in ED:   Medications   sodium chloride 0.9 % flush 10 mL (has no administration in time range)   piperacillin-tazobactam (ZOSYN) 3.375 g IVPB in 100 mL NS MBP (CD) (has no administration in time range)   magnesium sulfate 2g/50 mL (PREMIX) infusion (has no administration in time range)   Potassium Replacement - Follow Nurse / BPA Driven Protocol (has no administration in time range)   iopamidol (ISOVUE-300) 61 % injection 100 mL (85 mL Intravenous Given by Other 7/3/24 1202)   HYDROmorphone (DILAUDID) injection 0.5 mg (0.5 mg Intravenous Given 7/3/24 1219)   ondansetron (ZOFRAN) injection 4 mg (4 mg  Intravenous Given 7/3/24 1219)       Imaging results:  CT Abdomen Pelvis With Contrast    Result Date: 7/3/2024  1. Inflammatory change/cellulitis in the left inguinal subcutaneous tissues. 2. There is a 2.6 cm walled off collection in the left inguinal region consistent with a small abscess. 3. Bilateral inguinal lymphadenopathy largest on the left as discussed in more detail above. 4. Fatty infiltration of the liver.  Radiation dose reduction techniques were utilized, including automated exposure control and exposure modulation based on body size.       XR Chest 1 View    Result Date: 7/3/2024  FINDINGS AND IMPRESSION: Suggestion of subtle interstitial thickening within the periphery of the bilateral lung bases which may represent Kerley B-lines in the appropriate context. Findings also may be secondary to background chronic interstitial lung disease. Correlation with patient history is recommended with follow-up chest CT if clinically indicated. No pneumothorax is seen. Cardiac silhouette within normal limits for size.  This report was finalized on 7/3/2024 11:31 AM by Dr. Oscar Buck M.D on Workstation: [employer]       Ambulatory status:   - Up with assist    Social issues:   Social History     Socioeconomic History    Marital status: Single   Tobacco Use    Smoking status: Every Day     Current packs/day: 0.25     Average packs/day: 0.3 packs/day for 25.0 years (6.3 ttl pk-yrs)     Types: Cigarettes    Smokeless tobacco: Never    Tobacco comments:      cont to wean  on6-8 cigs q  using vapor aid also   Substance and Sexual Activity    Alcohol use: Yes     Comment: social    Drug use: No       NIH Stroke Scale:        Nursing report ED to floor:      Electronically signed by Molly Sahni RN at 07/03/24 1316       Will Ely MD at 07/03/24 1032           EMERGENCY DEPARTMENT ENCOUNTER    Room Number:  N436/1  Date of encounter:  7/3/2024  PCP: Priyanka Torres MD  Historian: Patient and mother  Relevant  information and history provided by sources other than the patient will be included below and in the ED Course.  Review of pertinent past medical records may also be included in record below and ED Course.    HPI:  Chief Complaint: Swelling to lower extremities and now swelling and pain to his left groin  A complete HPI/ROS/PMH/PSH/SH/FH are unobtainable due to: Not applicable  Context: Go Cho is a 59 y.o. male who presents to the ED c/o this patient has been having swelling to his lower extremities bilaterally for over 5 weeks.  He states has been fairly constant.  He works as a  and is on his feet most of the day or at least sitting down when he is not on his feet.  He does not notice any significant improvement of the swelling in the morning after he lays down.  He is then developed over the past 4 to 5 days pain and swelling to his left groin.  He is concerned that he might have an ingrown hair there.  He reports no fever.  He reports no chest pain or shortness of breath.  He did start hydrochlorothiazide for swelling to his lower extremity after his primary care doctor's evaluation on 6/27/2024.  He has been taking that with no relief of his symptoms and feels that the swelling is worsening.  He denies any exertional shortness of breath.  Denies really any abdominal swelling other than the swelling to the left side of his groin with tenderness there.  Denies any fevers or chills.  He only drinks alcohol occasionally.  He spoke with his primary care physician yesterday and he told him that he needs to go to the emergency department for further evaluation.        Previous Episodes: No  Current Symptoms: See above    MEDICAL HISTORY REVIEWED  I reviewed the note from his primary care physician at [facility] on 6/27/2024.  According to the note from the physician on 6/27/2024 patient been complaining of swelling of both feet that started about 1 month ago.  No complaint of chest pain or  shortness of breath does have a history of hypertension, diabetes mellitus I did review his medicine list at that time I can see that he was on a diuretic hydrochlorothiazide 12.5 mg a day as well as several other medicines including losartan for blood pressure he ordered an echo, lab work and BMP and hydrochlorothiazide.  His BNP was 25.  His glucose was 108 his creatinine was normal at 0.73 with a normal BUN sodium was 134 do not believe the echo has been completed as of yet as I do not see any results in care everywhere in [facility].      PAST MEDICAL HISTORY  Active Ambulatory Problems     Diagnosis Date Noted    Hyponatremia 10/03/2016    Dyspnea 10/03/2016    Type 2 diabetes mellitus with hyperglycemia 10/03/2016    Tobacco abuse 10/03/2016    Leukocytosis 10/03/2016    Myoglobinuria 10/04/2016    Abnormal LFTs (liver function tests) 10/04/2016    Chronic bilateral low back pain without sciatica 08/18/2021    DDD (degenerative disc disease), lumbar 08/18/2021    Lumbar facet joint syndrome 08/18/2021    Bulge of lumbar disc without myelopathy 08/18/2021     Resolved Ambulatory Problems     Diagnosis Date Noted    Hypoxia 10/03/2016     Past Medical History:   Diagnosis Date    Diabetes mellitus     Hyperlipidemia     Hypertension     Sleep apnea          PAST SURGICAL HISTORY  Past Surgical History:   Procedure Laterality Date    COLONOSCOPY  02/01/2016    Howie Greer MD    UPPER GASTROINTESTINAL ENDOSCOPY  08/07/2015    Howie Greer MD         FAMILY HISTORY  Family History   Problem Relation Age of Onset    No Known Problems Mother     Heart disease Father          SOCIAL HISTORY  Social History     Socioeconomic History    Marital status: Single   Tobacco Use    Smoking status: Every Day     Current packs/day: 0.25     Average packs/day: 0.3 packs/day for 25.0 years (6.3 ttl pk-yrs)     Types: Cigarettes    Smokeless tobacco: Never    Tobacco comments:      cont to wean  on6-8 cigs q  using vapor aid also    Vaping Use    Vaping status: Some Days    Substances: Nicotine   Substance and Sexual Activity    Alcohol use: Yes     Comment: social    Drug use: No         ALLERGIES  Patient has no known allergies.        REVIEW OF SYSTEMS  Review of Systems     All systems reviewed and negative except for those discussed in HPI.       PHYSICAL EXAM    I have reviewed the triage vital signs and nursing notes.    ED Triage Vitals [07/03/24 0955]   Temp Heart Rate Resp BP SpO2   98 °F (36.7 °C) 103 18 -- 96 %      Temp src Heart Rate Source Patient Position BP Location FiO2 (%)   Tympanic -- -- -- --       GENERAL: Male.  No acute vascular respiratory distress.Vital signs on my initial evaluation have been reviewed.  HENT: nares patent  Head/neck/ face are symmetric without gross deformity, signs of trauma, or swelling  EYES: no scleral icterus, no conjunctival pallor.  NECK: Supple, no meningismus  CV: regular rhythm, regular rate with intact distal pulses.  No murmur or rub.  Patient does have distal heart sounds  RESPIRATORY: normal effort and no respiratory distress.  Clear to auscultation bilaterally  ABDOMEN: soft and morbidly obese.  He does not have any obvious swelling to his abdomen.  To his left inguinal area he has some swelling some induration and redness and tenderness.  I do not feel any obvious fluctuance.  There is no purulent drainage.  MUSCULOSKELETAL: no deformity.  2+ edema to bilateral lower extremities that appear symmetric.  Start in the feet and then go up to just proximal to the knees.  There is some slight erythema noted to bilateral lower extremities at the feet and ankles.  There is a little warmth on palpation.  No crepitance.  No obvious fluctuance.  NEURO: alert and appropriate, moves all extremities, follows commands.  No focal motor or sensory changes  SKIN: warm, dry    Vital signs and nursing notes reviewed.        LAB RESULTS  Recent Results (from the past 24 hour(s))   ECG 12 Lead Other; New  onset swelling to lower extremities    Collection Time: 07/03/24 11:07 AM   Result Value Ref Range    QT Interval 347 ms    QTC Interval 425 ms   Comprehensive Metabolic Panel    Collection Time: 07/03/24 11:39 AM    Specimen: Blood   Result Value Ref Range    Glucose 105 (H) 65 - 99 mg/dL    BUN 7 (L) 8 - 23 mg/dL    Creatinine 0.73 (L) 0.76 - 1.27 mg/dL    Sodium 134 (L) 136 - 145 mmol/L    Potassium 3.3 (L) 3.5 - 5.2 mmol/L    Chloride 91 (L) 98 - 107 mmol/L    CO2 30.8 (H) 22.0 - 29.0 mmol/L    Calcium 9.9 8.6 - 10.5 mg/dL    Total Protein 8.5 6.0 - 8.5 g/dL    Albumin 4.2 3.5 - 5.2 g/dL    ALT (SGPT) 29 1 - 41 U/L    AST (SGOT) 23 1 - 40 U/L    Alkaline Phosphatase 86 39 - 117 U/L    Total Bilirubin 0.6 0.0 - 1.2 mg/dL    Globulin 4.3 gm/dL    A/G Ratio 1.0 g/dL    BUN/Creatinine Ratio 9.6 7.0 - 25.0    Anion Gap 12.2 5.0 - 15.0 mmol/L    eGFR 104.2 >60.0 mL/min/1.73   Protime-INR    Collection Time: 07/03/24 11:39 AM    Specimen: Blood   Result Value Ref Range    Protime 14.9 (H) 11.7 - 14.2 Seconds    INR 1.15 (H) 0.90 - 1.10   Urinalysis With Microscopic If Indicated (No Culture) - Urine, Clean Catch    Collection Time: 07/03/24 11:39 AM    Specimen: Urine, Clean Catch   Result Value Ref Range    Color, UA Yellow Yellow, Straw    Appearance, UA Clear Clear    pH, UA 6.5 5.0 - 8.0    Specific Gravity, UA 1.016 1.005 - 1.030    Glucose, UA >=1000 mg/dL (3+) (A) Negative    Ketones, UA Negative Negative    Bilirubin, UA Negative Negative    Blood, UA Negative Negative    Protein, UA Negative Negative    Leuk Esterase, UA Negative Negative    Nitrite, UA Negative Negative    Urobilinogen, UA 1.0 E.U./dL 0.2 - 1.0 E.U./dL   Lipase    Collection Time: 07/03/24 11:39 AM    Specimen: Blood   Result Value Ref Range    Lipase 32 13 - 60 U/L   BNP    Collection Time: 07/03/24 11:39 AM    Specimen: Blood   Result Value Ref Range    proBNP 186.0 0.0 - 900.0 pg/mL   High Sensitivity Troponin T    Collection Time: 07/03/24  11:39 AM    Specimen: Blood   Result Value Ref Range    HS Troponin T 41 (H) <22 ng/L   Lactic Acid, Plasma    Collection Time: 07/03/24 11:39 AM    Specimen: Blood   Result Value Ref Range    Lactate 1.5 0.5 - 2.0 mmol/L   Procalcitonin    Collection Time: 07/03/24 11:39 AM    Specimen: Blood   Result Value Ref Range    Procalcitonin 0.12 0.00 - 0.25 ng/mL   Magnesium    Collection Time: 07/03/24 11:39 AM    Specimen: Blood   Result Value Ref Range    Magnesium 1.5 (L) 1.6 - 2.4 mg/dL   CBC Auto Differential    Collection Time: 07/03/24 11:39 AM    Specimen: Blood   Result Value Ref Range    WBC 13.61 (H) 3.40 - 10.80 10*3/mm3    RBC 4.68 4.14 - 5.80 10*6/mm3    Hemoglobin 14.1 13.0 - 17.7 g/dL    Hematocrit 42.4 37.5 - 51.0 %    MCV 90.6 79.0 - 97.0 fL    MCH 30.1 26.6 - 33.0 pg    MCHC 33.3 31.5 - 35.7 g/dL    RDW 12.4 12.3 - 15.4 %    RDW-SD 40.4 37.0 - 54.0 fl    MPV 9.0 6.0 - 12.0 fL    Platelets 318 140 - 450 10*3/mm3    Neutrophil % 80.2 (H) 42.7 - 76.0 %    Lymphocyte % 11.2 (L) 19.6 - 45.3 %    Monocyte % 6.8 5.0 - 12.0 %    Eosinophil % 0.9 0.3 - 6.2 %    Basophil % 0.4 0.0 - 1.5 %    Immature Grans % 0.5 0.0 - 0.5 %    Neutrophils, Absolute 10.92 (H) 1.70 - 7.00 10*3/mm3    Lymphocytes, Absolute 1.52 0.70 - 3.10 10*3/mm3    Monocytes, Absolute 0.92 (H) 0.10 - 0.90 10*3/mm3    Eosinophils, Absolute 0.12 0.00 - 0.40 10*3/mm3    Basophils, Absolute 0.06 0.00 - 0.20 10*3/mm3    Immature Grans, Absolute 0.07 (H) 0.00 - 0.05 10*3/mm3    nRBC 0.0 0.0 - 0.2 /100 WBC   High Sensitivity Troponin T 2Hr    Collection Time: 07/03/24  1:56 PM    Specimen: Blood   Result Value Ref Range    HS Troponin T 31 (H) <22 ng/L    Troponin T Delta -10 (L) >=-4 - <+4 ng/L       Ordered the above labs and independently reviewed the results.        RADIOLOGY  CT Abdomen Pelvis With Contrast    Result Date: 7/3/2024  CT OF THE ABDOMEN AND PELVIS WITH CONTRAST 07/03/2024  HISTORY: Lower extremity edema. Left groin tenderness.  Axial  images were obtained from the lung bases to the symphysis pubis after intravenous contrast. No oral contrast was given.  There is mild fatty infiltration of the liver. The gallbladder, spleen, pancreas, adrenals and kidneys appear unremarkable.  There is some aortoiliac calcification. Urinary bladder and prostate gland appear normal.  There are multiple bilateral inguinal nodes which are small to mildly enlarged and largest is seen on the left. The largest left inguinal node measures up to approximately 1.7 cm. There is injection of the subcutaneous fat in the left inguinal region. In addition there is an approximately 2.6 cm walled off low-density collection in the subcutaneous region of the left groin which resembles an abscess.  No other abnormal fluid collections are seen.  No bowel wall thickening or bowel dilatation is seen. There is colonic diverticulosis. Urinary bladder and prostate gland appear normal.      1. Inflammatory change/cellulitis in the left inguinal subcutaneous tissues. 2. There is a 2.6 cm walled off collection in the left inguinal region consistent with a small abscess. 3. Bilateral inguinal lymphadenopathy largest on the left as discussed in more detail above. 4. Fatty infiltration of the liver.  Radiation dose reduction techniques were utilized, including automated exposure control and exposure modulation based on body size.       XR Chest 1 View    Result Date: 7/3/2024  Portable chest radiograph  HISTORY: Peripheral edema  TECHNIQUE: Single AP portable radiograph of the chest  COMPARISON: Chest radiograph 10/2/2016      FINDINGS AND IMPRESSION: Suggestion of subtle interstitial thickening within the periphery of the bilateral lung bases which may represent Kerley B-lines in the appropriate context. Findings also may be secondary to background chronic interstitial lung disease. Correlation with patient history is recommended with follow-up chest CT if clinically indicated. No pneumothorax  is seen. Cardiac silhouette within normal limits for size.  This report was finalized on 7/3/2024 11:31 AM by Dr. Oscar Buck M.D on Workstation: [id number]       I ordered the above noted radiological studies. Reviewed by me and discussed with radiologist.  See dictation for official radiology interpretation.      PROCEDURES    Procedures      MEDICATIONS GIVEN IN ER    Medications   sodium chloride 0.9 % flush 10 mL (has no administration in time range)   magnesium sulfate 2g/50 mL (PREMIX) infusion (has no administration in time range)   Potassium Replacement - Follow Nurse / BPA Driven Protocol (has no administration in time range)   aspirin EC tablet 81 mg (81 mg Oral Not Given 7/3/24 1544)   HYDROcodone-acetaminophen (NORCO) 7.5-325 MG per tablet 1 tablet (1 tablet Oral Given 7/3/24 1542)   losartan (COZAAR) tablet 100 mg (100 mg Oral Not Given 7/3/24 1545)   pantoprazole (PROTONIX) EC tablet 40 mg (has no administration in time range)   QUEtiapine (SEROquel) tablet 100 mg (has no administration in time range)   sodium chloride 0.9 % flush 10 mL (has no administration in time range)   sodium chloride 0.9 % flush 10 mL (has no administration in time range)   sodium chloride 0.9 % infusion 40 mL (has no administration in time range)   nitroglycerin (NITROSTAT) SL tablet 0.4 mg (has no administration in time range)   Potassium Replacement - Follow Nurse / BPA Driven Protocol (has no administration in time range)   sennosides-docusate (PERICOLACE) 8.6-50 MG per tablet 2 tablet (has no administration in time range)     And   polyethylene glycol (MIRALAX) packet 17 g (has no administration in time range)     And   bisacodyl (DULCOLAX) EC tablet 5 mg (has no administration in time range)     And   bisacodyl (DULCOLAX) suppository 10 mg (has no administration in time range)   ondansetron (ZOFRAN) injection 4 mg (has no administration in time range)   nicotine (NICODERM CQ) 21 MG/24HR patch 1 patch (has no  administration in time range)   nicotine polacrilex (NICORETTE) gum 2 mg (has no administration in time range)   piperacillin-tazobactam (ZOSYN) 3.375 g IVPB in 100 mL NS MBP (CD) (has no administration in time range)   Pharmacy to dose vancomycin (has no administration in time range)   dextrose (GLUTOSE) oral gel 15 g (has no administration in time range)   dextrose (D50W) (25 g/50 mL) IV injection 25 g (has no administration in time range)   glucagon (GLUCAGEN) injection 1 mg (has no administration in time range)   insulin lispro (HUMALOG/ADMELOG) injection 2-7 Units (has no administration in time range)   vancomycin 2250 mg/500 mL 0.9% NS IVPB ([facility]) (2,250 mg Intravenous New Bag 7/3/24 1450)   Magnesium Standard Dose Replacement - Follow Nurse / BPA Driven Protocol (has no administration in time range)   Vancomycin HCl 1,250 mg in sodium chloride 0.9 % 250 mL VTB (has no administration in time range)   potassium chloride 10 mEq in 100 mL IVPB (has no administration in time range)   iopamidol (ISOVUE-300) 61 % injection 100 mL (85 mL Intravenous Given by Other 7/3/24 1202)   HYDROmorphone (DILAUDID) injection 0.5 mg (0.5 mg Intravenous Given 7/3/24 1219)   ondansetron (ZOFRAN) injection 4 mg (4 mg Intravenous Given 7/3/24 1219)   piperacillin-tazobactam (ZOSYN) 3.375 g IVPB in 100 mL NS MBP (CD) (3.375 g Intravenous New Bag 7/3/24 1402)   furosemide (LASIX) injection 40 mg (40 mg Intravenous Given 7/3/24 1541)         All labs have been independently reviewed by me.  All radiology studies have been reviewed by me and I discussed with radiologist dictating the report when indicated below.  All EKG's independently viewed and interpreted by me.  Discussion below represents my analysis of pertinent findings related to patient's condition, differential diagnosis, treatment plan and final disposition.        PROGRESS, DATA ANALYSIS, CONSULTS, AND MEDICAL DECISION MAKING    Differential diagnosis includes   -  hepatobiliary pathology such as cholecystitis, cholangitis, and symptomatic cholelithiasis  -PUD  -Mesenteric ischemia  - Pancreatitis  - Dyspepsia  - Small bowel or large bowel obstruction  - Appendicitis  - Diverticulitis  - UTI including pyelonephritis  - Ureteral stone  - Zoster  - Colitis, including infectious and ischemic  - Atypical ACS  Concerned this patient has an infectious process to his left inguinal area very well could be an ingrown hair that is now developed a cellulitis.  The area is red tender and indurated.  Will check a CT scan of his abdomen pelvis and check lab work.  Concerning the swelling to his lower extremities.  It is not improved with diuretics.  He is never had swelling like this before.  It is symmetric and is bilateral.  He is not have any chest pain or shortness of breath.  There is some family history of heart failure but he is unaware if there is any specific congenital cardiomyopathy in the family.  He has not had his echo that his primary care physician has ordered as of yet.  His swelling is fairly significant.  I do not anticipate he is got an infection to his lower extremities bilaterally.  Informed him and his mother of the test that we will order.  All questions answered at this time.      ED Course as of 07/03/24 1617   Wed Jul 03, 2024   1116 My own independent interpretation of the EKG that was done at 11:07 AM reveals a rate of 90 it is sinus rhythm there is some intraventricular conduction delay there is appearance of left atrial enlargement there is some nonspecific ST and T wave changes some Q waves in the anterior septal leads  QT looks normal  Compared to the previous EKG in October 2, 2016 and there is some changes in this new EKG but they are nonspecific. [MM]   1225 HS Troponin T(!): 41 [MM]   1226 Magnesium(!): 1.5 [MM]   1226 Potassium(!): 3.3 [MM]   1226 Creatinine(!): 0.73 [MM]   1226 BUN(!): 7  This gentleman is not having any chest pain or shortness of  breath.  There is no exertional component of any exertional chest pain or shortness of breath.  Will continue to trend. [MM]   1226 WBC(!): 13.61 [MM]   1226 Neutrophil Rel %(!): 80.2 [MM]   1227 Patient in looking at the chest x-ray and reviewing the radiologist report there is some interstitial thickening in the periphery of the bilateral lung bases which very well could represent early mild congestive heart failure could also be chronic interstitial lung disease.  Please see complete dictated report from radiologist [MM]   1228 I reviewed the CT scan report from radiologist.  There is some inflammatory change and cellulitis in the left inguinal subcutaneous tissue there is this 2.6 cm walled off collection in the left inguinal region which could represent a small abscess.  There is bilateral inguinal adenopathy.  There is fatty infiltration of the liver.  Please see complete dictated report from radiologist. [MM]   1230 Lactate: 1.5 [MM]   1244 Informed the patient of the results of the CT scan and lab work.  Informed him of the treatment plan.  All questions answered at this time. [MM]   1253 I have reevaluated this patient.  Informed him of the results of the test.  He did drop his oxygen briefly after pain medicine.  His oxygen level is normal on 2 L.  Denies any chest pain or shortness of breath.  All questions answered. [MM]   1253 I did discuss the case with Dr. Castro who is on for [facility].  Informed him of the patient's presenting symptoms and results of the test.  All questions answered at this time.  Agrees to admit the patient to the hospital. [MM]   1611 I never received return call from general surgery.  I did inform Dr. Storey.  He will put a consult out to them. [MM]      ED Course User Index  [MM] Will Ely MD       AS OF 16:17 EDT VITALS:    BP - 147/78  HR - 93  TEMP - 99.5 °F (37.5 °C) (Oral)  02 SATS - 96%    SOCIAL DETERMINANTS OF HEALTH THAT IMPACT OR LIMIT CARE (For  example..Homelessness,safe discharge, inability to obtain care, follow up, or prescriptions):      DIAGNOSIS  Final diagnoses:   Peripheral edema, bilateral lower extremities   Cellulitis of other specified site: Cellulitis and abscess of left inguinal region   Troponin level elevated   Hypomagnesemia   Hypokalemia         DISPOSITION  I have reviewed the test results with my patient and explained the current treatment plan.  I answered all of the patient's questions.  The patient will be admitted to monitor bed at this time.  The patient is not hypotensive and is tolerating their current disease condition well enough for a monitored bed at this time.  The patient's current condition does not require intensive care treatment at this time.            DICTATED UTILIZING DRAGON DICTATION    Note Disclaimer: At [facility], we believe that sharing information builds trust and better relationships. You are receiving this note because you recently visited [facility]. It is possible you will see health information before a provider has talked with you about it. This kind of information can be easy to misunderstand. To help you fully understand what it means for your health, we urge you to discuss this note with your provider.       Will Ely MD  07/03/24 1611  I have refreshed and signed the chart again per request of medical records because the ED chart has been updated.     Will Ely MD  07/03/24 1617      Electronically signed by Will Ely MD at 07/03/24 1617       Rachel Lin RN at 07/03/24 0954          Pt ambulatory to triage, reports bilateral foot swelling and penile swelling. Swelling for last week.     Electronically signed by Rachel Lin RN at 07/03/24 1000       Oxygen Therapy (since admission)       Date/Time SpO2 Device (Oxygen Therapy) Flow (L/min) Oxygen Concentration (%) ETCO2 (mmHg)    07/05/24 1146 -- room air -- -- --    07/05/24 1027 -- room air -- -- --    07/05/24  1020 -- nasal cannula 2 -- --    07/05/24 0843 92 room air -- -- --    07/05/24 0030 95 -- -- -- --    07/04/24 2012 96 -- -- -- --    07/04/24 2011 88 -- -- -- --    07/04/24 2010 94 -- -- -- --    07/04/24 2000 -- nasal cannula 2 -- --    07/04/24 1450 -- nasal cannula 2 -- --    07/04/24 1310 93 -- -- -- --    07/04/24 0810 -- nasal cannula 2 -- --    07/04/24 0721 94 -- -- -- --    07/04/24 0405 -- nasal cannula 3 -- --    07/03/24 2104 -- nasal cannula 3 -- --    07/03/24 2015 92 nasal cannula 3 -- --    07/03/24 2000 92 nasal cannula 4 -- --    07/03/24 1945 95 face tent;nasal cannula 4 50 --    07/03/24 1930 97 face tent;nasal cannula 4 100 --    07/03/24 1915 95 face tent -- 100 --    07/03/24 1910 93 face tent -- 100 --    07/03/24 1905 87 nasal cannula 4 -- --    07/03/24 1800 97 -- -- -- --    07/03/24 1745 96 -- -- -- --    07/03/24 1738 92 nasal cannula 2 -- --    07/03/24 1621 -- nasal cannula 2 -- --    07/03/24 1536 96 -- -- -- --    07/03/24 1303 98 -- -- -- --    07/03/24 1301 99 -- -- -- --    07/03/24 1300 96 -- -- -- --    07/03/24 1224 -- nasal cannula 2 -- --    07/03/24 1223 87 -- -- -- --    07/03/24 0955 96 room air -- -- --          Intake & Output (last 3 days)         07/02 0701 07/03 0700 07/03 0701 07/04 0700 07/04 0701 07/05 0700 07/05 0701  07/06 0700    P.O.  0 4120     I.V. (mL/kg)  300 (3)      Total Intake(mL/kg)  300 (3) 4120 (40.8)     Urine (mL/kg/hr)  1850 2850 (1.2) 700 (1.3)    Total Output  1850 2850 700    Net  -1550 +1270 -700            Urine Unmeasured Occurrence  2 x 0 x            Lines, Drains & Airways       Active LDAs       Name Placement date Placement time Site Days    Peripheral IV 07/04/24 2000 Right Wrist 07/04/24 2000  Wrist  less than 1              Inactive LDAs       Name Placement date Placement time Removal date Removal time Site Days    [REMOVED] Peripheral IV 07/03/24 1145 Right Antecubital 07/03/24  1145  07/04/24 2000  Antecubital  1     [REMOVED] Peripheral IV 07/03/24 1755 Left;Posterior Hand 07/03/24  1755  07/04/24  0900  came back to unit without IV  Hand  less than 1    [REMOVED] ETT  07/03/24  1830  created via procedure documentation  07/03/24  1847  -- less than 1                  Medication Administration Report for [facility] Cho as of 7/3/24 through 7/5/24     Legend:    Given Hold Not Given Due Canceled Entry Other Actions    Time Time (Time) Time Time-Action         Discontinued     Completed     Future     MAR Hold     Linked             Medications 07/03/24 07/04/24 07/05/24     aspirin EC tablet 81 mg  Dose: 81 mg  Freq: Daily Route: PO  Start: 07/03/24 1338     Admin Instructions:   Do not crush or chew the capsules or tablets. The drug may not work as designed if the capsule or tablet is crushed or chewed. Swallow whole.  Do not exceed 4 grams of aspirin in a 24 hr period.    If given for pain, use the following pain scale:   Mild Pain = Pain Score of 1-3, CPOT 1-2  Moderate Pain = Pain Score of 4-6, CPOT 3-4  Severe Pain = Pain Score of 7-10, CPOT 5-8      (8324)-Not Given [C]          0938-Given          1007-Given             sennosides-docusate (PERICOLACE) 8.6-50 MG per tablet 2 tablet  Dose: 2 tablet  Freq: 2 Times Daily Route: PO  Start: 07/03/24 2100     Admin Instructions:   HOLD MEDICATION IF PATIENT HAS HAD BOWEL MOVEMENT. Start bowel management regimen if patient has not had a bowel movement after 12 hours.      (0802)-Not Given          0939-Given     2337-Given         1007-Given     2100           And   polyethylene glycol (MIRALAX) packet 17 g  Dose: 17 g  Freq: Daily PRN Route: PO  PRN Reason: Constipation  PRN Comment: Use if senna-docusate is ineffective  Start: 07/03/24 1322     Admin Instructions:   Use if no bowel movement after 12 hours. Mix in 6-8 ounces of water.  Use 4-8 ounces of water, tea, or juice for each 17 gram dose.           And   bisacodyl (DULCOLAX) EC tablet 5 mg  Dose: 5 mg  Freq: Daily  PRN Route: PO  PRN Reason: Constipation  PRN Comment: Use if polyethylene glycol is ineffective  Start: 07/03/24 1322     Admin Instructions:   Use if no bowel movement after 12 hours.  Swallow whole. Do not crush, split, or chew tablet.           And   bisacodyl (DULCOLAX) suppository 10 mg  Dose: 10 mg  Freq: Daily PRN Route: RE  PRN Reason: Constipation  PRN Comment: Use if bisacodyl oral is ineffective  Start: 07/03/24 1322     Admin Instructions:   Use if no bowel movement after 12 hours.  Hold for diarrhea           dextrose (D50W) (25 g/50 mL) IV injection 25 g  Dose: 25 g  Freq: Every 15 Minutes PRN Route: IV  PRN Reason: Low Blood Sugar  PRN Comment: Blood Sugar Less Than 70  Start: 07/03/24 1323     Admin Instructions:   Blood sugar less than 70; patient has IV access - Unresponsive, NPO or Unable To Safely Swallow      1733-MAR Hold     2055-MAR Unhold             dextrose (GLUTOSE) oral gel 15 g  Dose: 15 g  Freq: Every 15 Minutes PRN Route: PO  PRN Reason: Low Blood Sugar  PRN Comment: Blood sugar less than 70  Start: 07/03/24 1323     Admin Instructions:   BS<70, Patient Alert, Is not NPO, Can safely swallow.      1733-MAR Hold     2055-MAR Unhold             furosemide (LASIX) tablet 40 mg  Dose: 40 mg  Freq: Daily Route: PO  Start: 07/05/24 1115     Admin Instructions:   Hold for SBP less than 100, DBP less than 60.        1143-Given            glucagon (GLUCAGEN) injection 1 mg  Dose: 1 mg  Freq: Every 15 Minutes PRN Route: IM  PRN Reason: Low Blood Sugar  PRN Comment: Blood Glucose Less Than 70  Start: 07/03/24 1323     Admin Instructions:   Blood Glucose Less Than 70 - Patient Without IV Access - Unresponsive, NPO or Unable To Safely Swallow  Reconstitute powder for injection by adding 1 mL of -supplied sterile diluent or sterile water for injection to a vial containing 1 mg of the drug, to provide solutions containing 1 mg/mL. Shake vial gently to dissolve.      1733-MAR Hold      "2055-MAR Unhold             HYDROcodone-acetaminophen (NORCO) 7.5-325 MG per tablet 1 tablet  Dose: 1 tablet  Freq: Every 4 Hours PRN Route: PO  PRN Reason: Moderate Pain  Start: 07/03/24 2055     Admin Instructions:   Based on patient request - if ordered for moderate or severe pain, provider allows for administration of a medication prescribed for a lower pain scale.  [JASON]    Do not exceed 4 grams of acetaminophen in a 24 hr period. Max dose of 2gm for AST/ALT greater than 120 units/L        If given for pain, use the following pain scale:   Mild Pain = Pain Score of 1-3, CPOT 1-2  Moderate Pain = Pain Score of 4-6, CPOT 3-4  Severe Pain = Pain Score of 7-10, CPOT 5-8       1100-Given             insulin lispro (HUMALOG/ADMELOG) injection 2-9 Units  Dose: 2-9 Units  Freq: 4 Times Daily Before Meals & Nightly Route: SC  Start: 07/04/24 1230     Admin Instructions:   Correction Insulin - Moderate Dose (Total Insulin Dose 40-60 units/day, Average Weight Patient, Patient Taking Oral Hypoglycemic)    Blood Glucose 150-199 mg/dL - 2 units  Blood Glucose 200-249 mg/dL - 4 units  Blood Glucose 250-299 mg/dL - 6 units  Blood Glucose 300-349 mg/dL - 7 units  Blood Glucose 350-400 mg/dL - 8 units  Blood Glucose greater than 400 mg/dL - 9 units & Call Provider   Caution: Look alike/sound alike drug alert       1214-Given     (1655)-Not Given     2107-Given        (0954)-Not Given     (1134)-Not Given     1730 2100            losartan (COZAAR) tablet 100 mg  Dose: 100 mg  Freq: Daily Route: PO  Start: 07/03/24 1338     Admin Instructions:   Hold for SBP less than 100, DBP less than 60.      (1545)-Not Given [C]          0938-Given          1007-Given            Magnesium Standard Dose Replacement - Follow Nurse / BPA Driven Protocol  Freq: As Needed Route: XX  PRN Reason: Other  Start: 07/03/24 1335     Admin Instructions:   Open Order & Select \"[facility] Electrolyte Replacement Protocol Algorithm\" to View Details      " "1733-MAR Hold     2055-MAR Unhold             nicotine (NICODERM CQ) 21 MG/24HR patch 1 patch  Dose: 1 patch  Freq: Every 24 Hours Route: TD  Start: 07/03/24 1338     Admin Instructions:   Apply Patch to Clean, Dry, Hairless Area Daily - Rotating Sites.  REMOVE Old Patch Prior to Applying New Patch.  May Remove Patch at Bedtime If Needed to Prevent Insomnia.  Do Not Use Other Nicotine Products.  At Discharge, Follow Package Instructions.  Dispose of nicotine replacement therapies and their wrappers in non-hazardous pharmaceutical waste or in regular trash.      (1700)-Not Given     2104-Medication Applied         1311-Medication Removed     1312-Medication Applied         1312 (Medication Removed)     1338           nicotine polacrilex (NICORETTE) gum 2 mg  Dose: 2 mg  Freq: Every 1 Hour PRN Route: MT  PRN Reason: Smoking Cessation  Start: 07/03/24 1322     Admin Instructions:   Maximum 24 pieces in 24 hours.    Gum should be chewed until it tingles, then \"park\" between the gum and cheek.   When the tingling is gone, chew again until the tingle returns and once again \"park\" between gum and cheek.   Repeat until tingling is gone and discard.  At discharge, follow instructions on package  Dispose of nicotine replacement therapies and their wrappers in non-hazardous pharmaceutical waste or in regular trash.           nitroglycerin (NITROSTAT) SL tablet 0.4 mg  Dose: 0.4 mg  Freq: Every 5 Minutes PRN Route: SL  PRN Reason: Chest Pain  PRN Comment: Only if SBP Greater Than 100  Start: 07/03/24 1322     Admin Instructions:   If Pain Unrelieved After 3 Doses Notify MD  May administer up to 3 doses per episode.           ondansetron (ZOFRAN) injection 4 mg  Dose: 4 mg  Freq: Every 6 Hours PRN Route: IV  PRN Reasons: Nausea,Vomiting  Start: 07/03/24 1322     Admin Instructions:   If BOTH ondansetron (ZOFRAN) and promethazine (PHENERGAN) are ordered use ondansetron first and THEN promethazine IF ondansetron is ineffective.   " "        pantoprazole (PROTONIX) EC tablet 40 mg  Dose: 40 mg  Freq: Every Early Morning Route: PO  Start: 07/04/24 0600     Admin Instructions:   Swallow whole; do not crush, split, or chew.       0523-Given          0515-Given            piperacillin-tazobactam (ZOSYN) 3.375 g IVPB in 100 mL NS MBP (CD)  Dose: 3.375 g  Freq: Every 8 Hours Route: IV  Indications of Use: SKIN AND SOFT TISSUE INFECTION  Start: 07/03/24 2000 End: 07/10/24 1959      2105-New Bag          0523-New Bag     1214-New Bag     1950-New Bag        0400-New Bag     1142-New Bag     2000          potassium chloride (K-DUR,KLOR-CON) ER tablet 40 mEq  Dose: 40 mEq  Freq: Every 4 Hours Route: PO  Start: 07/05/24 0900 End: 07/05/24 1659     Admin Instructions:   Do not crush or chew the capsules or tablets. The drug may not work as designed if the capsule or tablet is crushed or chewed. Swallow whole.  Swallow whole; do not crush, split, or chew.        1007-Given     1300           Potassium Replacement - Follow Nurse / BPA Driven Protocol  Freq: As Needed Route: XX  PRN Reason: Other  Start: 07/03/24 1322     Admin Instructions:   Open Order & Select \"[facility] Electrolyte Replacement Protocol Algorithm\" to View Details           Potassium Replacement - Follow Nurse / BPA Driven Protocol  Freq: As Needed Route: XX  PRN Reason: Other  Start: 07/03/24 1258     Admin Instructions:   Open Order & Select \"[facility] Electrolyte Replacement Protocol Algorithm\" to View Details           QUEtiapine (SEROquel) tablet 100 mg  Dose: 100 mg  Freq: Nightly Route: PO  Start: 07/03/24 2100     Admin Instructions:   Caution: Look alike/sound alike drug alert      2105-Given          2337-Given          2100            sodium chloride 0.9 % flush 10 mL  Dose: 10 mL  Freq: As Needed Route: IV  PRN Reason: Line Care  Start: 07/03/24 1322           sodium chloride 0.9 % flush 10 mL  Dose: 10 mL  Freq: Every 12 Hours Scheduled Route: IV  Start: 07/03/24 1338      1616-Given     " 2118-Canceled Entry         0939-Given     2128-Given         1007-Given     2100            sodium chloride 0.9 % flush 10 mL  Dose: 10 mL  Freq: As Needed Route: IV  PRN Reason: Line Care  Start: 07/03/24 1044      1733-MAR Hold     2055-MAR Unhold             sodium chloride 0.9 % infusion 40 mL  Dose: 40 mL  Freq: As Needed Route: IV  PRN Reason: Line Care  Start: 07/03/24 1322     Admin Instructions:   Following administration of an IV intermittent medication, flush line with 40mL NS at 100mL/hr.           Completed Medications  Medications 07/03/24 07/04/24 07/05/24      famotidine (PEPCID) injection 20 mg  Dose: 20 mg  Freq: Once Route: IV  Start: 07/03/24 1746 End: 07/03/24 1756     Admin Instructions:   Give IV push over 2 minutes.      1756-Given              furosemide (LASIX) injection 40 mg  Dose: 40 mg  Freq: Once Route: IV  Start: 07/03/24 1338 End: 07/03/24 1541     Admin Instructions:   Doses over 100 mg will dispense an IVPB bolus.      1541-Given              glycopyrrolate (ROBINUL) injection 0.2 mg  Dose: 0.2 mg  Freq: 60 Minutes Pre-Op Route: IV  Start: 07/03/24 1750 End: 07/03/24 1804     Admin Instructions:    May give undiluted.  Incompatible with LR in solution, but Y-site compatible. Waste only black container.      1804-Given              HYDROmorphone (DILAUDID) injection 0.5 mg  Dose: 0.5 mg  Freq: Once Route: IV  Start: 07/03/24 1230 End: 07/03/24 1219     Admin Instructions:   Based on patient request - if ordered for moderate or severe pain, provider allows for administration of a medication prescribed for a lower pain scale.  If given for pain, use the following pain scale:  Mild Pain = Pain Score of 1-3, CPOT 1-2  Moderate Pain = Pain Score of 4-6, CPOT 3-4  Severe Pain = Pain Score of 7-10, CPOT 5-8      1219-Given              iopamidol (ISOVUE-300) 61 % injection 100 mL  Dose: 100 mL  Freq: Once in Imaging Route: IV  Start: 07/03/24 1218 End: 07/03/24 1202      1202-Given by  "Other              magnesium sulfate 2g/50 mL (PREMIX) infusion  Dose: 2 g  Freq: Once Route: IV  Start: 07/03/24 1246 End: 07/03/24 1720      1720-New Bag              ondansetron (ZOFRAN) injection 4 mg  Dose: 4 mg  Freq: Once Route: IV  Start: 07/03/24 1230 End: 07/03/24 1219     Admin Instructions:   \"If multiple N/V medications ordered, use in the following order: Ondansetron, Prochlorperazine, Promethazine. Use PO unless patient refuses or patient unable to swallow.\"      1219-Given              perflutren (DEFINITY) 8.476 mg in Sodium Chloride (PF) 0.9 % 10 mL injection  Dose: 2 mL  Freq: Once in Imaging Route: IV  Start: 07/05/24 0900 End: 07/05/24 0808     Admin Instructions:   Mix 1.3 mL of mechanically activated Definity with 8.7 mL of normal saline. A total of 2 mL of the resulting Definity solution was administered.        0808-Given            piperacillin-tazobactam (ZOSYN) 3.375 g IVPB in 100 mL NS MBP (CD)  Dose: 3.375 g  Freq: Once Route: IV  Indications of Use: SEPSIS  Start: 07/03/24 1244 End: 07/03/24 1432      1402-New Bag [C]              potassium chloride 10 mEq in 100 mL IVPB  Dose: 10 mEq  Freq: Every 1 Hour Route: IV  Start: 07/03/24 1645 End: 07/03/24 2208     Admin Instructions:   OUTPATIENT/NON-MONITORED UNITS: Potassium Chloride standard bolus infusion rate is a maximum of 10 mEq/hr on unmonitored patients    MONITORED UNITS: Potassium Chloride standard bolus infusion rate is a maximum of 20 mEq/hr on ECG monitored patients ONLY      1617-New Bag     1915-New Bag [C]     2025-New Bag [C]       2108-New Bag              vancomycin 2250 mg/500 mL 0.9% NS IVPB ([facility])  Dose: 20 mg/kg  Weight Dosing Info: 108 kg  Freq: Once Route: IV  Indications of Use: SKIN AND SOFT TISSUE INFECTION  Start: 07/03/24 1400 End: 07/03/24 1705      1450-New Bag              Discontinued Medications  Medications 07/03/24 07/04/24 07/05/24      diphenhydrAMINE (BENADRYL) injection 12.5 mg  Dose: 12.5 mg  Freq: " Every 15 Minutes PRN Route: IV  PRN Reason: Itching  PRN Comment: May repeat x 1  Start: 07/03/24 1842 End: 07/03/24 2045     Admin Instructions:   Caution: Look alike/sound alike drug alert. This med may be ordered in other forms and routes. Before giving verify the last time the drug was given by any route/form.            droperidol (INAPSINE) injection 0.625 mg  Dose: 0.625 mg  Freq: Every 20 Minutes PRN Route: IV  PRN Reasons: Nausea,Vomiting  Indications of Use: NAUSEA AND VOMITING  Start: 07/03/24 1842 End: 07/03/24 2045     Admin Instructions:   Use ondansetron first; proceed to droperidol for nausea refractory to ondansetron.           Or   droperidol (INAPSINE) injection 0.625 mg  Dose: 0.625 mg  Freq: Every 20 Minutes PRN Route: IM  PRN Reasons: Nausea,Vomiting  Start: 07/03/24 1842 End: 07/03/24 2045     Admin Instructions:   Use ondansetron first; proceed to droperidol for nausea refractory to ondansetron.           ePHEDrine injection 5 mg  Dose: 5 mg  Freq: Once As Needed Route: IV  PRN Comment: symptomatic hypotension - Notify attending anesthesiologist if this needs to be given  Start: 07/03/24 1842 End: 07/03/24 2045     Admin Instructions:   Caution: Look alike/sound alike drug alert   Dilute with NS to 5-10 mg/mL.  Central line preferred, if unavailable use large bore IV access with frequent nurse monitoring of IV site.           fentaNYL citrate (PF) (SUBLIMAZE) injection 50 mcg  Dose: 50 mcg  Freq: Every 5 Minutes PRN Route: IV  PRN Reason: Severe Pain  Start: 07/03/24 1842 End: 07/03/24 2045     Admin Instructions:   Maximum total dose of fentanyl is 200 mcg.  If given for pain, use the following pain scale:  Mild Pain = Pain Score of 1-3, CPOT 1-2  Moderate Pain = Pain Score of 4-6, CPOT 3-4  Severe Pain = Pain Score of 7-10, CPOT 5-8           fentaNYL citrate (PF) (SUBLIMAZE) injection 50 mcg  Dose: 50 mcg  Freq: Once As Needed Route: IV  PRN Reason: Severe Pain  Start: 07/03/24 1744 End:  07/03/24 1850     Admin Instructions:   Maximum total dose of fentanyl is 50 mcg without additional orders from physician. May be used for preoperative pain or procedural sedation.  If given for pain, use the following pain scale:  Mild Pain = Pain Score of 1-3, CPOT 1-2  Moderate Pain = Pain Score of 4-6, CPOT 3-4  Severe Pain = Pain Score of 7-10, CPOT 5-8           flumazenil (ROMAZICON) injection 0.2 mg  Dose: 0.2 mg  Freq: As Needed Route: IV  PRN Comment: for benzodiazepine induced unresponsiveness or sedation  Indications of Use: BENZODIAZEPINE-INDUCED SEDATION  Start: 07/03/24 1842 End: 07/03/24 2045     Admin Instructions:   Notify Anesthesia if given  ** give IV over 15-30 seconds **           hydrALAZINE (APRESOLINE) injection 5 mg  Dose: 5 mg  Freq: Every 10 Minutes PRN Route: IV  PRN Reason: High Blood Pressure  PRN Comment: for systolic blood pressure greater than 180 mmHg or diastolic blood pressure greater than 105 mmHg  Start: 07/03/24 1842 End: 07/03/24 2045     Admin Instructions:   Up to 20 mg. If labetalol and hydralazine are both ordered, use labetalol first.  Caution: Look alike/sound alike drug alert           HYDROcodone-acetaminophen (NORCO) 5-325 MG per tablet 1 tablet  Dose: 1 tablet  Freq: Once As Needed Route: PO  PRN Reason: Moderate Pain  Start: 07/03/24 1842 End: 07/03/24 2045     Admin Instructions:   Based on patient request - if ordered for moderate or severe pain, provider allows for administration of a medication prescribed for a lower pain scale.  [JASON]    Do not exceed 4 grams of acetaminophen in a 24 hr period. Max dose of 2gm for AST/ALT greater than 120 units/L        If given for pain, use the following pain scale:   Mild Pain = Pain Score of 1-3, CPOT 1-2  Moderate Pain = Pain Score of 4-6, CPOT 3-4  Severe Pain = Pain Score of 7-10, CPOT 5-8           HYDROcodone-acetaminophen (NORCO) 7.5-325 MG per tablet 1 tablet  Dose: 1 tablet  Freq: Every 6 Hours PRN Route: PO  PRN  Reason: Moderate Pain  Start: 07/03/24 1322 End: 07/03/24 2055     Admin Instructions:   Based on patient request - if ordered for moderate or severe pain, provider allows for administration of a medication prescribed for a lower pain scale.  [JASON]    Do not exceed 4 grams of acetaminophen in a 24 hr period. Max dose of 2gm for AST/ALT greater than 120 units/L        If given for pain, use the following pain scale:   Mild Pain = Pain Score of 1-3, CPOT 1-2  Moderate Pain = Pain Score of 4-6, CPOT 3-4  Severe Pain = Pain Score of 7-10, CPOT 5-8      1542-Given              HYDROmorphone (DILAUDID) injection 0.5 mg  Dose: 0.5 mg  Freq: Every 5 Minutes PRN Route: IV  PRN Reason: Moderate Pain  Start: 07/03/24 1842 End: 07/03/24 2045     Admin Instructions:   Maximum total dose of hydromorphone is 2 mg.  If given for pain, use the following pain scale:  Mild Pain = Pain Score of 1-3, CPOT 1-2  Moderate Pain = Pain Score of 4-6, CPOT 3-4  Severe Pain = Pain Score of 7-10, CPOT 5-8           insulin lispro (HUMALOG/ADMELOG) injection 2-7 Units  Dose: 2-7 Units  Freq: 4 Times Daily Before Meals & Nightly Route: SC  Start: 07/03/24 1730 End: 07/04/24 1142     Admin Instructions:   Correction Insulin - Low Dose - Total Insulin Dose Less Than 40 units/day (Lean, Elderly or Renal Patients)    Blood Glucose 150-199 mg/dL - 2 units  Blood Glucose 200-249 mg/dL - 3 units  Blood Glucose 250-299 mg/dL - 4 units  Blood Glucose 300-349 mg/dL - 5 units  Blood Glucose 350-400 mg/dL - 6 units  Blood Glucose Greater Than 400 mg/dL - 7 units & Call Provider   Caution: Look alike/sound alike drug alert      (1646)-Not Given     1733-MAR Hold     2055-MAR Unhold       (2101)-Not Given          (0809)-Not Given     (1158)-Not Given [C]            ipratropium-albuterol (DUO-NEB) nebulizer solution 3 mL  Dose: 3 mL  Freq: Once As Needed Route: NEBULIZATION  PRN Reasons: Wheezing,Shortness of Air  PRN Comment: bronchospasm  Start: 07/03/24 7037  End: 07/03/24 2045     Admin Instructions:   Notify Anesthesia if minineb is given           labetalol (NORMODYNE,TRANDATE) injection 5 mg  Dose: 5 mg  Freq: Every 5 Minutes PRN Route: IV  PRN Reason: High Blood Pressure  PRN Comment: for systolic blood pressure greater than 180 mmHg or diastolic blood pressure greater than 105 mmHg  Start: 07/03/24 1842 End: 07/03/24 2045     Admin Instructions:   Hold for heart rate less than 60.    If labetalol and hydralazine are both ordered, use labetalol first. Maximum dose of labetalol is 20mg IV while in PACU, notify anesthesiologist for further orders if needed.  Give IV Push over 2 minutes.           lactated ringers infusion  Rate: 9 mL/hr Dose: 9 mL/hr  Freq: Continuous Route: IV  Start: 07/03/24 1746 End: 07/03/24 2055      1756-New Bag     1822-Currently Infusing     1900-Stopped            lidocaine (XYLOCAINE) 1 % injection 0.5 mL  Dose: 0.5 mL  Freq: Once As Needed Route: ID  PRN Comment: IV Start  Start: 07/03/24 1744 End: 07/03/24 1850           midazolam (VERSED) injection 1 mg  Dose: 1 mg  Freq: Every 5 Minutes PRN Route: IV  PRN Comment: Anxiety prophylaxis, Pre-op comfort  Start: 07/03/24 1744 End: 07/03/24 1850     Admin Instructions:   May repeat dose in 5 minutes one time then contact provider for additional orders.        1756-Given              naloxone (NARCAN) injection 0.2 mg  Dose: 0.2 mg  Freq: As Needed Route: IV  PRN Reasons: Opioid Reversal,Respiratory Depression  PRN Comment: unresponsiveness, decrease oxygen saturation  Indications of Use: ACUTE RESPIRATORY FAILURE,OPIOID-INDUCED RESPIRATORY DEPRESSION  Start: 07/03/24 1842 End: 07/03/24 2045     Admin Instructions:   Notify Anesthesia if given           ondansetron (ZOFRAN) injection 4 mg  Dose: 4 mg  Freq: Once As Needed Route: IV  PRN Reasons: Nausea,Vomiting  Indications of Use: POSTOPERATIVE NAUSEA AND VOMITING  Start: 07/03/24 1842 End: 07/03/24 2045     Admin Instructions:   If BOTH  ondansetron (ZOFRAN) and promethazine (PHENERGAN) are ordered use ondansetron first and THEN promethazine IF ondansetron is ineffective.           oxyCODONE-acetaminophen (PERCOCET) 7.5-325 MG per tablet 1 tablet  Dose: 1 tablet  Freq: Every 4 Hours PRN Route: PO  PRN Reason: Severe Pain  Start: 07/03/24 1842 End: 07/03/24 2045     Admin Instructions:   [JASON]    Do not exceed 4 grams of acetaminophen in a 24 hr period. Max dose of 2gm for AST/ALT greater than 120 units/L        If given for pain, use the following pain scale:   Mild Pain = Pain Score of 1-3, CPOT 1-2  Moderate Pain = Pain Score of 4-6, CPOT 3-4  Severe Pain = Pain Score of 7-10, CPOT 5-8           Pharmacy to dose vancomycin  Freq: Continuous PRN Route: XX  PRN Reason: Consult  Indications of Use: SKIN AND SOFT TISSUE INFECTION  Start: 07/03/24 1322 End: 07/04/24 1154            promethazine (PHENERGAN) suppository 25 mg  Dose: 25 mg  Freq: Once As Needed Route: RE  PRN Reasons: Nausea,Vomiting  Start: 07/03/24 1842 End: 07/03/24 2045     Admin Instructions:   If BOTH ondansetron (ZOFRAN) and promethazine (PHENERGAN) are ordered use ondansetron first and THEN promethazine IF ondansetron is ineffective.           Or   promethazine (PHENERGAN) tablet 25 mg  Dose: 25 mg  Freq: Once As Needed Route: PO  PRN Reasons: Nausea,Vomiting  Start: 07/03/24 1842 End: 07/03/24 2045     Admin Instructions:   If BOTH ondansetron (ZOFRAN) and promethazine (PHENERGAN) are ordered use ondansetron first and THEN promethazine IF ondansetron is ineffective.             sodium chloride (NS) irrigation solution  Freq: As Needed  Start: 07/03/24 1822 End: 07/03/24 1917 1822-Given [C]              sodium chloride 0.9 % flush 3 mL  Dose: 3 mL  Freq: Every 12 Hours Scheduled Route: IV  Start: 07/03/24 2100 End: 07/03/24 1850           sodium chloride 0.9 % flush 3-10 mL  Dose: 3-10 mL  Freq: As Needed Route: IV  PRN Reason: Line Care  Start: 07/03/24 1744 End: 07/03/24  1850           Vancomycin HCl 1,250 mg in sodium chloride 0.9 % 250 mL VTB  Dose: 1,250 mg  Freq: Every 12 Hours Route: IV  Indications of Use: SKIN AND SOFT TISSUE INFECTION  Start: 07/04/24 0300 End: 07/04/24 1153       0325-New Bag                         Blood Administration Record (From admission, onward)      None             Operative/Procedure Notes (all)        Ani Cummings MD at 07/03/24 1836  Version 1 of 1         Operative Note :  Ani Cummings MD      Go Cho  1965    Procedure Date: 07/03/24    Pre-op Diagnosis:  Soft tissue abscess of inguinal region [L02.214]    Post-Operative Diagnosis:  Soft tissue abscess of left inguinal region [L02.214]  Cellulitis of skin and soft tissues left inguinal region    Procedure:   Incision and drainage subcutaneous abscess left inguinal region    Surgeon: Ani Cummings MD    Assistant: None    Anesthesia:  General (general endotracheal tube)    Estimated Blood Loss: minimal    Specimens: Wound culture from left groin    Complications: None    Indications:  The patient is a 59-year-old gentleman who was admitted through the emergency room this evening with acute onset bilateral lower extremity edema and swelling as well as erythema and pain of the left inguinal region.  He was diagnosed with a cutaneous abscess and surrounding cellulitis of the left groin via CT in the ER.  I recommended going to the operating room tonight for incision and drainage of this under propofol sedation.  He just took Farxiga today, so the anesthesiology staff would like to do this under general anesthesia for airway protection given risk of aspiration.  He has been counseled on the risks of the procedure and has consented to proceed.    Findings: Cutaneous abscess of left groin filled with pasty, brown, thick fluid that was cultured and fully evacuated    Description of procedure:  The patient was brought to the operating room and intubated in supine  position on the OR table.  General anesthesia was induced and the left groin was prepped and draped in sterile fashion.  A surgical timeout was completed.  The skin at the central most area of fluctuance was incised using a 15 blade scalpel, penetrating into the subcutaneous abscess where a moderate amount of thick, pasty, brown fluid evacuated.  A wound culture was obtained and sent to microbiology.  The cavity was completely evacuated, probed, and deloculated.  The cavity was irrigated with 200 cc of sterile saline and blotted dry.  The abscess cavity was then packed with 1/4 inch iodoform gauze and covered with 4 x 4 and tape.  He was then extubated and transferred to PACU in stable condition with all counts correct per nursing.    Ani Cummings MD  General, Robotic, and Endoscopic Surgery  [facility]    [address], [address]  [place] [address]  P: [phone]  F: [phone]       Electronically signed by Ani Cummings MD at 07/03/24 0282          Physician Progress Notes (all)        Elena Iverson MD at 07/05/24 1018          [place]   59 y.o.  male    LOS: 2 days   Patient Care Team:  Priyanka Torres MD as PCP - General (Internal Medicine)      Subjective   Patient denies any chest pain or dyspnea  Interval History:     Patient Complaints:     Review of Systems:       Medication Review:   Current Facility-Administered Medications:     aspirin EC tablet 81 mg, 81 mg, Oral, Daily, Ani Cummings MD, 81 mg at 07/05/24 1007    sennosides-docusate (PERICOLACE) 8.6-50 MG per tablet 2 tablet, 2 tablet, Oral, BID, 2 tablet at 07/05/24 1007 **AND** polyethylene glycol (MIRALAX) packet 17 g, 17 g, Oral, Daily PRN **AND** bisacodyl (DULCOLAX) EC tablet 5 mg, 5 mg, Oral, Daily PRN **AND** bisacodyl (DULCOLAX) suppository 10 mg, 10 mg, Rectal, Daily PRN, Ani Cummings MD    dextrose (D50W) (25 g/50 mL) IV injection 25 g, 25 g, Intravenous, Q15 Min PRN,  Ani Cummings MD    dextrose (GLUTOSE) oral gel 15 g, 15 g, Oral, Q15 Min PRN, Ani Cummings MD    glucagon (GLUCAGEN) injection 1 mg, 1 mg, Intramuscular, Q15 Min PRN, Ani Cummings MD    HYDROcodone-acetaminophen (NORCO) 7.5-325 MG per tablet 1 tablet, 1 tablet, Oral, Q4H PRN, Ani Cummings MD, 1 tablet at 07/04/24 1100    insulin lispro (HUMALOG/ADMELOG) injection 2-9 Units, 2-9 Units, Subcutaneous, 4x Daily AC & at Bedtime, Rios Castro MD, 2 Units at 07/04/24 2107    losartan (COZAAR) tablet 100 mg, 100 mg, Oral, Daily, Ani Cummings MD, 100 mg at 07/05/24 1007    Magnesium Standard Dose Replacement - Follow Nurse / BPA Driven Protocol, , Does not apply, PRN, Ani Cummings MD    nicotine (NICODERM CQ) 21 MG/24HR patch 1 patch, 1 patch, Transdermal, Q24H, Ani Cummings MD, 1 patch at 07/04/24 1312    nicotine polacrilex (NICORETTE) gum 2 mg, 2 mg, Mouth/Throat, Q1H PRN, Ani Cummings MD    nitroglycerin (NITROSTAT) SL tablet 0.4 mg, 0.4 mg, Sublingual, Q5 Min PRN, Ani Cummings MD    ondansetron (ZOFRAN) injection 4 mg, 4 mg, Intravenous, Q6H PRN, Ani Cummings MD    pantoprazole (PROTONIX) EC tablet 40 mg, 40 mg, Oral, Q AM, Ani Cumimngs MD, 40 mg at 07/05/24 0515    piperacillin-tazobactam (ZOSYN) 3.375 g IVPB in 100 mL NS MBP (CD), 3.375 g, Intravenous, Q8H, Ani Cummings MD, 3.375 g at 07/05/24 0400    potassium chloride (K-DUR,KLOR-CON) ER tablet 40 mEq, 40 mEq, Oral, Q4H, Rios Castro MD, 40 mEq at 07/05/24 1007    Potassium Replacement - Follow Nurse / BPA Driven Protocol, , Does not apply, Emiliano MIGUEL Danielle, MD    Potassium Replacement - Follow Nurse / BPA Driven Protocol, , Does not apply, Emiliano MIGUEL Danielle, MD    QUEtiapine (SEROquel) tablet 100 mg, 100 mg, Oral, Nightly, Ani Cummings MD, 100 mg at 07/04/24 7111    [COMPLETED] Insert Peripheral IV, , , Once **AND** sodium chloride 0.9  % flush 10 mL, 10 mL, Intravenous, PRN, Ani Cummings MD    sodium chloride 0.9 % flush 10 mL, 10 mL, Intravenous, Q12H, Ani Cummings MD, 10 mL at 07/05/24 1007    sodium chloride 0.9 % flush 10 mL, 10 mL, Intravenous, PRN, Ani Cummings MD    sodium chloride 0.9 % infusion 40 mL, 40 mL, Intravenous, Emiliano MIGUEL Danielle, MD      Objective   Vital Sign Min/Max for last 24 hours  Temp  Min: 97.5 °F (36.4 °C)  Max: 98.2 °F (36.8 °C)   BP  Min: 130/70  Max: 158/71    Pulse  Min: 75  Max: 78     Wt Readings from Last 3 Encounters:   07/05/24 100 kg (221 lb)   08/18/21 124 kg (273 lb 9.6 oz)   06/22/21 120 kg (265 lb)        Intake/Output Summary (Last 24 hours) at 7/5/2024 1018  Last data filed at 7/4/2024 2300  Gross per 24 hour   Intake 4000 ml   Output 2850 ml   Net 1150 ml     Physical Exam:      General Appearance:    Well developed and well nourished in no acute distress   Head:    Normocephalic, atraumatic   Eyes:            Conjunctivae normal, anicteric, no xanthelasma   Neck:   supple, trachea midline, no thyromegaly, no carotid bruit, no JVD, no elevated CVP   Lungs:     Clear to auscultation,respirations regular, even and                unlabored    Heart:    Regular rhythm and normal rate, normal S1 and S2,            No murmur, no gallop, no rub, no click   Chest Wall:    No abnormalities observed   Abdomen:     Normal bowel sounds, soft, nondistended           Rectal:     Deferred   Extremities:   No edema. Moves all extremities well, no cyanosis, no erythema   Pulses:   Pulses palpable and equal bilaterally   Skin:   No bleeding, bruising or rash   Neurologic:   awake alert and oriented x3, speech clear and approp, no facial drooping     :    Monitor:      Results Review:         Sodium Sodium   Date Value Ref Range Status   07/05/2024 135 (L) 136 - 145 mmol/L Final   07/04/2024 136 136 - 145 mmol/L Final   07/03/2024 134 (L) 136 - 145 mmol/L Final      Potassium Potassium   Date  "Value Ref Range Status   07/05/2024 3.6 3.5 - 5.2 mmol/L Final   07/04/2024 3.8 3.5 - 5.2 mmol/L Final   07/03/2024 3.3 (L) 3.5 - 5.2 mmol/L Final     Comment:     Slight hemolysis detected by analyzer. Result may be falsely elevated.      Chloride Chloride   Date Value Ref Range Status   07/05/2024 97 (L) 98 - 107 mmol/L Final   07/04/2024 97 (L) 98 - 107 mmol/L Final   07/03/2024 91 (L) 98 - 107 mmol/L Final      Bicarbonate No results found for: \"PLASMABICARB\"   BUN BUN   Date Value Ref Range Status   07/05/2024 8 8 - 23 mg/dL Final   07/04/2024 9 8 - 23 mg/dL Final   07/03/2024 7 (L) 8 - 23 mg/dL Final      Creatinine Creatinine   Date Value Ref Range Status   07/05/2024 0.68 (L) 0.76 - 1.27 mg/dL Final   07/04/2024 0.64 (L) 0.76 - 1.27 mg/dL Final   07/03/2024 0.73 (L) 0.76 - 1.27 mg/dL Final      Calcium Calcium   Date Value Ref Range Status   07/05/2024 8.1 (L) 8.6 - 10.5 mg/dL Final   07/04/2024 8.5 (L) 8.6 - 10.5 mg/dL Final   07/03/2024 9.9 8.6 - 10.5 mg/dL Final      Magnesium Magnesium   Date Value Ref Range Status   07/04/2024 2.0 1.6 - 2.4 mg/dL Final   07/03/2024 1.5 (L) 1.6 - 2.4 mg/dL Final        Results from last 7 days   Lab Units 07/05/24  0332   WBC 10*3/mm3 10.78   HEMOGLOBIN g/dL 11.5*   HEMATOCRIT % 36.3*   PLATELETS 10*3/mm3 319     Lab Results   Lab Value Date/Time    TROPONINT 31 (H) 07/03/2024 1356    TROPONINT 41 (H) 07/03/2024 1139    TROPONINT <0.010 10/03/2016 0800    TROPONINT <0.010 10/02/2016 4379            Echo EF Estimated  No results found for: \"ECHOEFEST\"  Echocardiogram Findings    Left Ventricle Normal left ventricular cavity size and wall thickness noted. All left ventricular wall segments contract normally.   Right Ventricle Normal right ventricular cavity size, wall thickness, systolic function and septal motion noted.   Left Atrium Normal left atrial size and volume noted.   Right Atrium Normal right atrial cavity size noted.   Aortic Valve The aortic valve is " structurally normal with no regurgitation or stenosis present.   Mitral Valve The mitral valve is structurally normal with no regurgitation or significant stenosis present.   Tricuspid Valve The tricuspid valve is structurally normal with no significant regurgitation or significant stenosis present. Estimated right ventricular systolic pressure from tricuspid regurgitation is normal (<35 mmHg).   Pulmonic Valve The pulmonic valve is structurally normal with no regurgitation or significant stenosis present.   Greater Vessels No dilation of the aortic root is present. The inferior vena cava is normally sized. Partial IVC inspiratory collapse of less than 50% noted.   Pericardium The pericardium is normal. There is no evidence of pericardial effusion. .       Assessment/ Plan  Assessment & Plan   Active Hospital Problems    Diagnosis  POA    **Soft tissue abscess of inguinal region [L02.214]  Yes    Peripheral edema [R60.0]  Yes    Hypokalemia [E87.6]  Yes    Hyponatremia [E87.1]  Yes    Type 2 diabetes mellitus with hyperglycemia [E11.65]  Yes    Tobacco abuse [Z72.0]  Yes    Leukocytosis [D72.829]  Yes     Status post I&D of inguinal hernia abscess  Will start him on a Lasix 40 mg daily  Discharge plans per primary  We will sign off        Elena Iverson MD  07/05/24  10:18 EDT             Electronically signed by Elena Iverson MD at 07/05/24 1022       Maria C Rivera APRN at 07/05/24 0940       Attestation signed by Ani Cummings MD at 07/05/24 1004    I have reviewed this documentation and agree.  I removed the iodoform packing from his groin abscess incision and discarded.  I went over discharge instructions regarding bathing, specifically asking him to wash the open wound with soap and water daily in the shower and cover with a dry gauze.  No more invasive packing will be necessary.  His cultures have grown E. coli, sensitive to Augmentin and Bactrim.  Either of these would be reasonable  options for twice daily dosing for another 7 days of treatment.  He knows to call my office to schedule a follow-up in the next 2 weeks.  I will sign off, please call back with any questions or concerns.                  Acute Care General Surgery Progress Note    Patient: Go Cho  YOB: 1965  MRN: [id number]      Assessment  Go Cho is a 59 y.o. male who is POD 2 incision and drainage of left inguinal abscess. Packing removed today, site is clean with minimal serosanguinous drainage no purulence noted. Wound cultures positive for E.coli. WBC has normalized today. AVSS.     Plan  Okay for discharge home today from general surgery standpoint  Will need Augmentin for 7 more days.   Will need to follow up with Dr. Cummings in 2 weeks  Okay to shower, no baths/pools/submerging in water   Clean area with soap and water daily, apply dry gauze over incision site, no lotions or ointments to site      Subjective  Denies nausea, vomiting, or diarrhea. Pain controlled with medication. Denies fever or chills.     Objective    Vitals:    07/05/24 0843   BP: 158/71   Pulse: 75   Resp: 18   Temp: 97.9 °F (36.6 °C)   SpO2: 92%          Physical Exam  Constitutional: alert, oriented, in no acute distress  Respiratory: Normal work of breathing, Symmetric excursion  Cardiovascular: Well pefused, no jugular venous distention evident   Abdominal: soft, non-distended, nontender  Skin: warm, dry, incision site with no purulent drainage, scant serosanguinous drainage      Laboratory Results  Results from last 7 days   Lab Units 07/05/24  0332 07/04/24  0149 07/03/24  1139   WBC 10*3/mm3 10.78 15.24* 13.61*   HEMOGLOBIN g/dL 11.5* 12.8* 14.1   HEMATOCRIT % 36.3* 39.9 42.4   PLATELETS 10*3/mm3 319 302 318     Results from last 7 days   Lab Units 07/05/24  0332 07/04/24  0149 07/03/24  1139   SODIUM mmol/L 135* 136 134*   POTASSIUM mmol/L 3.6 3.8 3.3*   CHLORIDE mmol/L 97* 97* 91*   CO2 mmol/L 31.0* 30.9*  30.8*   BUN mg/dL 8 9 7*   CREATININE mg/dL 0.68* 0.64* 0.73*   CALCIUM mg/dL 8.1* 8.5* 9.9   BILIRUBIN mg/dL  --   --  0.6   ALK PHOS U/L  --   --  86   ALT (SGPT) U/L  --   --  29   AST (SGOT) U/L  --   --  23   GLUCOSE mg/dL 160* 220* 105*     I have personally reviewed 7/5 labs        WARREN Hernandez  [facility] General Surgery  [facility]    [address], [address]  [place], [place], [address]  P: [phone]  F: [phone]      Electronically signed by Ani Cummings MD at 07/05/24 1009       Maria C Rivera APRN at 07/04/24 1144       Attestation signed by Ani Cummings MD at 07/04/24 1158    I have reviewed this documentation and agree.  Since he is planning to stay today for further cardiac workup, I would like to leave the iodoform in place within the left groin abscess cavity.  I will remove that tomorrow and no specific dressing changes will be needed.  I have asked the nurses to change the overlying gauze as needed for any saturation.  Follow-up cultures drawn yesterday which currently show gram-negative rods, and continue empiric Zosyn until the cultures have speciated.  I have discontinued the vancomycin as there were no gram-positive cocci on Gram stain/culture so I doubt this represents an MRSA infection.                  [facility] General Surgery Progress Note    Patient: Go Cho  YOB: 1965  MRN: [id number]      Assessment  Go Cho is a 59 y.o. male who is POD 1 incision and drainage of Left inguinal abscess. Surrounding erythema improving. Incision site in good order with no purulent drainage noted. Blood cultures pending. Wound cultures pending with gram negative bacilli growing, currently receiving IV Zosyn and Vancomycin. AVSS.     Plan  Keep packing in place today, can change dry topper as needed.   Will unpack dressing tomorrow.   Continue IV antibiotics       Subjective  Denies nausea or vomiting. Pain controlled  with medication.     Objective    Vitals:    24 0721   BP: 126/69   Pulse: 79   Resp: 18   Temp:    SpO2: 94%           Physical Exam  Constitutional: alert, oriented, in no acute distress  Respiratory: Normal work of breathing, Symmetric excursion  Cardiovascular: Well pefused, no jugular venous distention evident   Abdominal: soft, non-distended, non-tender  Skin: warm, dry, incision site is clean, intact, minimal drainage, no purulence noted from site, erythema improving      Laboratory Results  Results from last 7 days   Lab Units 24  0149 24  1139   WBC 10*3/mm3 15.24* 13.61*   HEMOGLOBIN g/dL 12.8* 14.1   HEMATOCRIT % 39.9 42.4   PLATELETS 10*3/mm3 302 318     Results from last 7 days   Lab Units 24  0149 24  1139   SODIUM mmol/L 136 134*   POTASSIUM mmol/L 3.8 3.3*   CHLORIDE mmol/L 97* 91*   CO2 mmol/L 30.9* 30.8*   BUN mg/dL 9 7*   CREATININE mg/dL 0.64* 0.73*   CALCIUM mg/dL 8.5* 9.9   BILIRUBIN mg/dL  --  0.6   ALK PHOS U/L  --  86   ALT (SGPT) U/L  --  29   AST (SGOT) U/L  --  23   GLUCOSE mg/dL 220* 105*     I have personally reviewed  labs         WARREN Hernandez  Acute Care General Surgery  [facility]    [address] [place] [address], [address]  [place], [place], [facility]  P: [phone]  F: [phone]      Electronically signed by Ani Cummings MD at 24 1153       Rios Castro MD at 24 1134           LOS: 1 day     Name: Go Palmaress  Age: 59 y.o.  Sex: male  :  1965  MRN: [id number]         Primary Care Physician: Priyanka Torres MD    Subjective   Subjective  Underwent incision and drainage of left inguinal abscess last evening.  He reports improvement of his lower extremity swelling.  Denies any shortness of breath or chest pain.  No fevers or chills.  States his groin is sore.    Objective   Vital Signs  Temp:  [97.6 °F (36.4 °C)-99.5 °F (37.5 °C)] 97.6 °F (36.4 °C)  Heart Rate:  [] 79  Resp:  [16-20]  "18  BP: (126-170)/(65-94) 126/69  Body mass index is 33.72 kg/m².    Objective:  General Appearance:  Comfortable and in no acute distress.    Vital signs: (most recent): Blood pressure 126/69, pulse 79, temperature 97.6 °F (36.4 °C), temperature source Oral, resp. rate 18, height 172.7 cm (68\"), weight 101 kg (221 lb 12.8 oz), SpO2 94%.    Lungs:  Normal effort and normal respiratory rate.  (Crackles at the bilateral bases are improved compared to yesterday)  Heart: Normal rate.  Regular rhythm.    Abdomen: Abdomen is soft.  Bowel sounds are normal.   There is no abdominal tenderness.     Extremities: There is no dependent edema or local swelling.  (Little to no edema in the legs today)  Neurological: Patient is alert and oriented to person, place and time.    Skin:  Warm and dry.                Results Review:       I reviewed the patient's new clinical results.    Results from last 7 days   Lab Units 07/04/24  0149 07/03/24  1139   WBC 10*3/mm3 15.24* 13.61*   HEMOGLOBIN g/dL 12.8* 14.1   PLATELETS 10*3/mm3 302 318     Results from last 7 days   Lab Units 07/04/24  0149 07/03/24  1139   SODIUM mmol/L 136 134*   POTASSIUM mmol/L 3.8 3.3*   CHLORIDE mmol/L 97* 91*   CO2 mmol/L 30.9* 30.8*   BUN mg/dL 9 7*   CREATININE mg/dL 0.64* 0.73*   CALCIUM mg/dL 8.5* 9.9   GLUCOSE mg/dL 220* 105*     Results from last 7 days   Lab Units 07/03/24  1139   INR  1.15*             Scheduled Meds:   aspirin, 81 mg, Oral, Daily  insulin lispro, 2-7 Units, Subcutaneous, 4x Daily AC & at Bedtime  losartan, 100 mg, Oral, Daily  nicotine, 1 patch, Transdermal, Q24H  pantoprazole, 40 mg, Oral, Q AM  piperacillin-tazobactam, 3.375 g, Intravenous, Q8H  QUEtiapine, 100 mg, Oral, Nightly  senna-docusate sodium, 2 tablet, Oral, BID  sodium chloride, 10 mL, Intravenous, Q12H  vancomycin, 1,250 mg, Intravenous, Q12H      PRN Meds:     senna-docusate sodium **AND** polyethylene glycol **AND** bisacodyl **AND** bisacodyl    dextrose    dextrose   "  glucagon (human recombinant)    HYDROcodone-acetaminophen    Magnesium Standard Dose Replacement - Follow Nurse / BPA Driven Protocol    nicotine polacrilex    nitroglycerin    ondansetron    Pharmacy to dose vancomycin    Potassium Replacement - Follow Nurse / BPA Driven Protocol    Potassium Replacement - Follow Nurse / BPA Driven Protocol    [COMPLETED] Insert Peripheral IV **AND** sodium chloride    sodium chloride    sodium chloride  Continuous Infusions:  Pharmacy to dose vancomycin,         Assessment & Plan   Active Hospital Problems    Diagnosis  POA    **Soft tissue abscess of inguinal region [L02.214]  Yes    Peripheral edema [R60.0]  Yes    Hypokalemia [E87.6]  Yes    Hyponatremia [E87.1]  Yes    Type 2 diabetes mellitus with hyperglycemia [E11.65]  Yes    Tobacco abuse [Z72.0]  Yes    Leukocytosis [D72.829]  Yes      Resolved Hospital Problems   No resolved problems to display.       Assessment & Plan    -POD 1 from left inguinal abscess I&D.  Wound culture growing gram-negative bacilli.  Continue spectrum antibiotics with Zosyn and vancomycin while awaiting additional culture information and trending the leukocytosis  -He reports improvement of his lower extremity edema after a dose of IV Lasix yesterday.  Crackles at the bilateral lower lung fields are also improved from yesterday.    -CT chest noted to show a subcentimeters pulmonary nodule which will require 3-month follow-up.  Pulmonary edema versus underlying ILD noted.  At present, favor the former given his improvement on exam today after Lasix.  -Chronic emphysematous change also seen on CT and he is trying to quit smoking.  -Echocardiogram is pending.  Troponin trended down after admission.  He reports a strong family cardiac history.  Cardiology has been asked to see.  -Blood pressure better controlled today and will continue present regimen  -Blood sugars elevated.  Increase sliding scale.  Check hemoglobin A1c        Rios Maxwell  MD [facility]  07/04/24  11:34 EDT      Electronically signed by Rios Castro MD at 07/04/24 1142          Consult Notes (all)        Zhen Garber MD at 07/04/24 1911        Consult Orders    1. Inpatient Cardiology Consult [[id number]] ordered by Ani Cummings MD at 07/03/24 1337                 Cardiology Consult            PAST MEDICAL HISTORY       Active Ambulatory Problems     Diagnosis Date Noted    Hyponatremia 10/03/2016    Dyspnea 10/03/2016    Type 2 diabetes mellitus with hyperglycemia 10/03/2016    Tobacco abuse 10/03/2016    Leukocytosis 10/03/2016    Myoglobinuria 10/04/2016    Abnormal LFTs (liver function tests) 10/04/2016    Chronic bilateral low back pain without sciatica 08/18/2021    DDD (degenerative disc disease), lumbar 08/18/2021    Lumbar facet joint syndrome 08/18/2021    Bulge of lumbar disc without myelopathy 08/18/2021           Resolved Ambulatory Problems     Diagnosis Date Noted    Hypoxia 10/03/2016           Past Medical History:   Diagnosis Date    Diabetes mellitus      Hyperlipidemia      Hypertension      Sleep apnea              PAST SURGICAL HISTORY  Surgical History         Past Surgical History:   Procedure Laterality Date    COLONOSCOPY   02/01/2016     Howie Greer MD    UPPER GASTROINTESTINAL ENDOSCOPY   08/07/2015     Howie Greer MD               FAMILY HISTORY        Family History   Problem Relation Age of Onset    No Known Problems Mother      Heart disease Father              SOCIAL HISTORY  Social History   Social History            Socioeconomic History    Marital status: Single   Tobacco Use    Smoking status: Every Day       Current packs/day: 0.25       Average packs/day: 0.3 packs/day for 25.0 years (6.3 ttl pk-yrs)       Types: Cigarettes    Smokeless tobacco: Never    Tobacco comments:        cont to wean  on6-8 cigs q  using vapor aid also   Vaping Use    Vaping status: Some Days     Substances: Nicotine   Substance and Sexual Activity    Alcohol use: Yes       Comment: social    Drug use: No               ALLERGIES  Patient has no known allergies.           REVIEW OF SYSTEMS  Review of Systems      All systems reviewed and negative except for those discussed in HPI.         PHYSICAL EXAM     I have reviewed the triage vital signs and nursing notes.            ED Triage Vitals [07/03/24 0955]   Temp Heart Rate Resp BP SpO2   98 °F (36.7 °C) 103 18 -- 96 %       Temp src Heart Rate Source Patient Position BP Location FiO2 (%)   Tympanic -- -- -- --         GENERAL: Male.  No acute vascular respiratory distress.Vital signs on my initial evaluation have been reviewed.  HENT: nares patent  Head/neck/ face are symmetric without gross deformity, signs of trauma, or swelling  EYES: no scleral icterus, no conjunctival pallor.  NECK: Supple, no meningismus  CV: regular rhythm, regular rate with intact distal pulses.  No murmur or rub.  Patient does have distal heart sounds  RESPIRATORY: normal effort and no respiratory distress.  Clear to auscultation bilaterally  ABDOMEN: soft and morbidly obese.  He does not have any obvious swelling to his abdomen.  To his left inguinal area he has some swelling some induration and redness and tenderness.  I do not feel any obvious fluctuance.  There is no purulent drainage.  MUSCULOSKELETAL: no deformity.  2+ edema to bilateral lower extremities that appear symmetric.  Start in the feet and then go up to just proximal to the knees.  There is some slight erythema noted to bilateral lower extremities at the feet and ankles.  There is a little warmth on palpation.  No crepitance.  No obvious fluctuance.  NEURO: alert and appropriate, moves all extremities, follows commands.  No focal motor or sensory changes  SKIN: warm, dry     Vital signs and nursing notes reviewed.           LAB RESULTS  Recent Results         Recent Results (from the past 24 hour(s))   ECG 12 Lead  Other; New onset swelling to lower extremities     Collection Time: 07/03/24 11:07 AM   Result Value Ref Range     QT Interval 347 ms     QTC Interval 425 ms   Comprehensive Metabolic Panel     Collection Time: 07/03/24 11:39 AM     Specimen: Blood   Result Value Ref Range     Glucose 105 (H) 65 - 99 mg/dL     BUN 7 (L) 8 - 23 mg/dL     Creatinine 0.73 (L) 0.76 - 1.27 mg/dL     Sodium 134 (L) 136 - 145 mmol/L     Potassium 3.3 (L) 3.5 - 5.2 mmol/L     Chloride 91 (L) 98 - 107 mmol/L     CO2 30.8 (H) 22.0 - 29.0 mmol/L     Calcium 9.9 8.6 - 10.5 mg/dL     Total Protein 8.5 6.0 - 8.5 g/dL     Albumin 4.2 3.5 - 5.2 g/dL     ALT (SGPT) 29 1 - 41 U/L     AST (SGOT) 23 1 - 40 U/L     Alkaline Phosphatase 86 39 - 117 U/L     Total Bilirubin 0.6 0.0 - 1.2 mg/dL     Globulin 4.3 gm/dL     A/G Ratio 1.0 g/dL     BUN/Creatinine Ratio 9.6 7.0 - 25.0     Anion Gap 12.2 5.0 - 15.0 mmol/L     eGFR 104.2 >60.0 mL/min/1.73   Protime-INR     Collection Time: 07/03/24 11:39 AM     Specimen: Blood   Result Value Ref Range     Protime 14.9 (H) 11.7 - 14.2 Seconds     INR 1.15 (H) 0.90 - 1.10   Urinalysis With Microscopic If Indicated (No Culture) - Urine, Clean Catch     Collection Time: 07/03/24 11:39 AM     Specimen: Urine, Clean Catch   Result Value Ref Range     Color, UA Yellow Yellow, Straw     Appearance, UA Clear Clear     pH, UA 6.5 5.0 - 8.0     Specific Gravity, UA 1.016 1.005 - 1.030     Glucose, UA >=1000 mg/dL (3+) (A) Negative     Ketones, UA Negative Negative     Bilirubin, UA Negative Negative     Blood, UA Negative Negative     Protein, UA Negative Negative     Leuk Esterase, UA Negative Negative     Nitrite, UA Negative Negative     Urobilinogen, UA 1.0 E.U./dL 0.2 - 1.0 E.U./dL   Lipase     Collection Time: 07/03/24 11:39 AM     Specimen: Blood   Result Value Ref Range     Lipase 32 13 - 60 U/L   BNP     Collection Time: 07/03/24 11:39 AM     Specimen: Blood   Result Value Ref Range     proBNP 186.0 0.0 - 900.0 pg/mL    High Sensitivity Troponin T     Collection Time: 07/03/24 11:39 AM     Specimen: Blood   Result Value Ref Range     HS Troponin T 41 (H) <22 ng/L   Lactic Acid, Plasma     Collection Time: 07/03/24 11:39 AM     Specimen: Blood   Result Value Ref Range     Lactate 1.5 0.5 - 2.0 mmol/L   Procalcitonin     Collection Time: 07/03/24 11:39 AM     Specimen: Blood   Result Value Ref Range     Procalcitonin 0.12 0.00 - 0.25 ng/mL   Magnesium     Collection Time: 07/03/24 11:39 AM     Specimen: Blood   Result Value Ref Range     Magnesium 1.5 (L) 1.6 - 2.4 mg/dL   CBC Auto Differential     Collection Time: 07/03/24 11:39 AM     Specimen: Blood   Result Value Ref Range     WBC 13.61 (H) 3.40 - 10.80 10*3/mm3     RBC 4.68 4.14 - 5.80 10*6/mm3     Hemoglobin 14.1 13.0 - 17.7 g/dL     Hematocrit 42.4 37.5 - 51.0 %     MCV 90.6 79.0 - 97.0 fL     MCH 30.1 26.6 - 33.0 pg     MCHC 33.3 31.5 - 35.7 g/dL     RDW 12.4 12.3 - 15.4 %     RDW-SD 40.4 37.0 - 54.0 fl     MPV 9.0 6.0 - 12.0 fL     Platelets 318 140 - 450 10*3/mm3     Neutrophil % 80.2 (H) 42.7 - 76.0 %     Lymphocyte % 11.2 (L) 19.6 - 45.3 %     Monocyte % 6.8 5.0 - 12.0 %     Eosinophil % 0.9 0.3 - 6.2 %     Basophil % 0.4 0.0 - 1.5 %     Immature Grans % 0.5 0.0 - 0.5 %     Neutrophils, Absolute 10.92 (H) 1.70 - 7.00 10*3/mm3     Lymphocytes, Absolute 1.52 0.70 - 3.10 10*3/mm3     Monocytes, Absolute 0.92 (H) 0.10 - 0.90 10*3/mm3     Eosinophils, Absolute 0.12 0.00 - 0.40 10*3/mm3     Basophils, Absolute 0.06 0.00 - 0.20 10*3/mm3     Immature Grans, Absolute 0.07 (H) 0.00 - 0.05 10*3/mm3     nRBC 0.0 0.0 - 0.2 /100 WBC   High Sensitivity Troponin T 2Hr     Collection Time: 07/03/24  1:56 PM     Specimen: Blood   Result Value Ref Range     HS Troponin T 31 (H) <22 ng/L     Troponin T Delta -10 (L) >=-4 - <+4 ng/L            Ordered the above labs and independently reviewed the results.           RADIOLOGY  CT Abdomen Pelvis With Contrast     Result Date: 7/3/2024  CT OF  THE ABDOMEN AND PELVIS WITH CONTRAST 07/03/2024  HISTORY: Lower extremity edema. Left groin tenderness.  Axial images were obtained from the lung bases to the symphysis pubis after intravenous contrast. No oral contrast was given.  There is mild fatty infiltration of the liver. The gallbladder, spleen, pancreas, adrenals and kidneys appear unremarkable.  There is some aortoiliac calcification. Urinary bladder and prostate gland appear normal.  There are multiple bilateral inguinal nodes which are small to mildly enlarged and largest is seen on the left. The largest left inguinal node measures up to approximately 1.7 cm. There is injection of the subcutaneous fat in the left inguinal region. In addition there is an approximately 2.6 cm walled off low-density collection in the subcutaneous region of the left groin which resembles an abscess.  No other abnormal fluid collections are seen.  No bowel wall thickening or bowel dilatation is seen. There is colonic diverticulosis. Urinary bladder and prostate gland appear normal.       1. Inflammatory change/cellulitis in the left inguinal subcutaneous tissues. 2. There is a 2.6 cm walled off collection in the left inguinal region consistent with a small abscess. 3. Bilateral inguinal lymphadenopathy largest on the left as discussed in more detail above. 4. Fatty infiltration of the liver.  Radiation dose reduction techniques were utilized, including automated exposure control and exposure modulation based on body size.        XR Chest 1 View     Result Date: 7/3/2024  Portable chest radiograph  HISTORY: Peripheral edema  TECHNIQUE: Single AP portable radiograph of the chest  COMPARISON: Chest radiograph 10/2/2016       FINDINGS AND IMPRESSION: Suggestion of subtle interstitial thickening within the periphery of the bilateral lung bases which may represent Kerley B-lines in the appropriate context. Findings also may be secondary to background chronic interstitial lung  disease. Correlation with patient history is recommended with follow-up chest CT if clinically indicated. No pneumothorax is seen. Cardiac silhouette within normal limits for size.  This report was finalized on 7/3/2024 11:31 AM by Dr. Oscar Buck M.D on Workstation: [id number]               Electronically signed by Zhen Garber MD at 07/04/24 1913       Maria C Rivera APRN at 07/03/24 1446        Consult Orders    1. Inpatient General Surgery Consult [[id number]] ordered by Rios Castro MD at 07/03/24 1334              Attestation signed by Ani Cummings MD at 07/03/24 1636    I have reviewed this documentation and agree.  He has a sizable cutaneous abscess of the left inguinal region that I would recommend we drained tonight in the operating room via a formal incision and drainage under propofol sedation.  I discussed with him the risks and benefits of the procedure and he has consented to proceed.  He should remain strict NPO.  This was also discussed with his nurse at bedside.  I will obtain a wound culture to help guide postoperative antibiotic management, and I have ensured he is on empiric antibiotics preoperatively.                  General Surgery     Summary:     Go Cho is a 59 y.o. year old with a left inguinal abscess with surrounding cellulitis. CT significant for 2.6cm loculated collection. Center of fluctuance with surrounding erythema and induration noted on exam. Mild leukocytosis, normal lactate, afebrile with stable vitals.   Plan:   OR  today for I&D with Dr. Cummings , this can be done under MAC with no major cardiac risk  NPO for OR today    Chief Complaint:    Left inguinal abscess    History of Present Illness:     Go Cho is a 59 y.o. year old who presented to the ED with Left inguinal pain, swelling, and redness. He states this started about 4 days ago and progressively got worse. He denies fever or chills. Denies anything like this  prior. Patient also complains of bilateral lower extremity swelling that has been ongoing for the past 5 weeks. Denies SOB, chest pain, or cough. Denies any cardiac history. Cardiology has been consulted and echo has been ordered. Patient denies any previous surgeries.     Past Medical History:   Past Medical History:   Diagnosis Date    Diabetes mellitus     Hyperlipidemia     Hypertension     Sleep apnea         Past Surgical History:    Past Surgical History:   Procedure Laterality Date    COLONOSCOPY  02/01/2016    Howie Greer MD    UPPER GASTROINTESTINAL ENDOSCOPY  08/07/2015    Howie Greer MD       Family History:    Family History   Problem Relation Age of Onset    No Known Problems Mother     Heart disease Father        Social History:    Social History     Socioeconomic History    Marital status: Single   Tobacco Use    Smoking status: Every Day     Current packs/day: 0.25     Average packs/day: 0.3 packs/day for 25.0 years (6.3 ttl pk-yrs)     Types: Cigarettes    Smokeless tobacco: Never    Tobacco comments:      cont to wean  on6-8 cigs q  using vapor aid also   Substance and Sexual Activity    Alcohol use: Yes     Comment: social    Drug use: No     Every day tobacco smoker  Occasional ETOH     Allergies:   No Known Allergies    Medications:   Norvasc  Aspirin 81 mg  Lipitor  Baclofen  Celebrex  Farxiga  Glimepiride  Hydrochlorothiazide  Norco  Lisinopril  Losartan  Metformin ER  Omeprazole  Seroquel  Viagra  Testosterone gel  Kenalog cream  Trulicity  Tumeric  Vitamin D    Radiology/Endoscopy:    CT abdomen pelvis with contrast 7/3/24  IMPRESSION:  1. Inflammatory change/cellulitis in the left inguinal subcutaneous  tissues.  2. There is a 2.6 cm walled off collection in the left inguinal region  consistent with a small abscess.  3. Bilateral inguinal lymphadenopathy largest on the left as discussed  in more detail above.  4. Fatty infiltration of the liver.     Radiation dose reduction  techniques were utilized, including automated  exposure control and exposure modulation based on body size.    Labs:    Results from last 7 days   Lab Units 07/03/24  1139   WBC 10*3/mm3 13.61*   HEMOGLOBIN g/dL 14.1   HEMATOCRIT % 42.4   PLATELETS 10*3/mm3 318     Results from last 7 days   Lab Units 07/03/24  1139   SODIUM mmol/L 134*   POTASSIUM mmol/L 3.3*   CHLORIDE mmol/L 91*   CO2 mmol/L 30.8*   BUN mg/dL 7*   CREATININE mg/dL 0.73*   CALCIUM mg/dL 9.9   BILIRUBIN mg/dL 0.6   ALK PHOS U/L 86   ALT (SGPT) U/L 29   AST (SGOT) U/L 23   GLUCOSE mg/dL 105*   Lactate 1.5  Procalcitonin 0.12  PT 14.9  INR 1.15  proBNP 186  HS Troponin 31    Review of Systems   Constitutional:  Negative for appetite change, chills and fever.   HENT:  Negative for congestion and sore throat.    Respiratory:  Negative for chest tightness and shortness of breath.    Cardiovascular:  Positive for leg swelling. Negative for chest pain and palpitations.   Gastrointestinal:  Negative for abdominal pain, nausea and vomiting.   Genitourinary:  Negative for dysuria.   Musculoskeletal:  Negative for myalgias.   Skin:  Positive for color change and wound.   Neurological:  Negative for dizziness and light-headedness.   Psychiatric/Behavioral:  The patient is not nervous/anxious.         Physical Exam  Constitutional:       General: He is not in acute distress.     Appearance: Normal appearance. He is not ill-appearing.   HENT:      Head: Normocephalic and atraumatic.   Eyes:      Extraocular Movements: Extraocular movements intact.      Pupils: Pupils are equal, round, and reactive to light.   Cardiovascular:      Rate and Rhythm: Normal rate.      Pulses: Normal pulses.   Pulmonary:      Effort: Pulmonary effort is normal.   Abdominal:      Palpations: Abdomen is soft.      Tenderness: There is no abdominal tenderness.   Musculoskeletal:      Right lower leg: Edema present.      Left lower leg: Edema present.   Skin:     Comments: Left  groin/mons pubis with erythema, warmth and induration with a fluctuant center that is very painful to palpation   Neurological:      General: No focal deficit present.      Mental Status: He is alert and oriented to person, place, and time.   Psychiatric:         Mood and Affect: Mood normal.         Behavior: Behavior normal.                     WARREN Hernandez   General and Endoscopic Surgery  [facility]    [address], [address]  [place], [place], [address]  P: [phone]  F: [phone]      Electronically signed by Ani Cummings MD at 07/03/24 [id number]

## 2024-07-08 LAB
BACTERIA SPEC AEROBE CULT: NORMAL
BACTERIA SPEC AEROBE CULT: NORMAL

## 2024-07-10 LAB — BACTERIA SPEC ANAEROBE CULT: ABNORMAL

## 2024-07-25 ENCOUNTER — OFFICE VISIT (OUTPATIENT)
Dept: SURGERY | Facility: CLINIC | Age: 59
End: 2024-07-25
Payer: COMMERCIAL

## 2024-07-25 VITALS
DIASTOLIC BLOOD PRESSURE: 78 MMHG | WEIGHT: 222 LBS | HEIGHT: 68 IN | SYSTOLIC BLOOD PRESSURE: 150 MMHG | BODY MASS INDEX: 33.65 KG/M2

## 2024-07-25 DIAGNOSIS — Z09 SURGICAL FOLLOWUP: Primary | ICD-10-CM

## 2024-07-25 PROCEDURE — 99024 POSTOP FOLLOW-UP VISIT: CPT | Performed by: SURGERY

## 2024-08-07 NOTE — PROGRESS NOTES
CHIEF COMPLAINT:   Chief Complaint   Patient presents with    Post-op     Incision and drainage subcutaneous abscess left inguinal region  07/03/24       HISTORY OF PRESENT ILLNESS:  This is a 59 y.o. male who presents for a post-operative visit after undergoing incision and drainage of a subcutaneous abscess within the left groin on 7/3/2024.  He was discharged to home 2 days postop and has been doing well with no further drainage in over 1 week.  The area is healed, with only a tiny amount of lingering soft tissue induration.  There is no further cellulitis of the skin.    Wound culture: Moderate growth E. 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  Ext: no significant edema, calves nontender  BMI is >= 30 and <35. (Class 1 Obesity). The following options were offered after discussion;: Information on healthy weight added to patient's after visit summary.    A/P:  This is a 59 y.o. male patient who is S/P incision and drainage left inguinal abscess on 7/3/2024    He is healing very well.  His subcutaneous abscess is essentially healed.  There is a possibility there may be an underlying sebaceous cyst that was the nidus of the infection.  I advised him to monitor for any palpable lump of the skin that may develop over the next few months and call me should that be appreciable.  If so, I be happy to perform a sebaceous cyst excision here in the office under local anesthetic.  He expressed understanding and will see me back on an as-needed basis.    Ani Cummings MD  General, Robotic, and Endoscopic Surgery  [facility]    [address], [address]  [place] [address]  P: [phone]  F: [phone]

## (undated) DEVICE — APPL CHLORAPREP HI/LITE 26ML ORNG

## (undated) DEVICE — GLV SURG BIOGEL LTX PF 6

## (undated) DEVICE — SPNG GZ WOVN 4X4IN 12PLY 10/BX STRL

## (undated) DEVICE — STRIP PACKING W IODOFORM 1/4

## (undated) DEVICE — GOWN,SIRUS,NON REINFRCD,LARGE,SET IN SL: Brand: MEDLINE

## (undated) DEVICE — PATIENT RETURN ELECTRODE, SINGLE-USE, CONTACT QUALITY MONITORING, ADULT, WITH 9FT CORD, FOR PATIENTS WEIGING OVER 33LBS. (15KG): Brand: MEGADYNE

## (undated) DEVICE — GLV SURG SENSICARE POLYISPRN W/ALOE PF LF 6.5 GRN STRL

## (undated) DEVICE — LOU MINOR PROCEDURE: Brand: MEDLINE INDUSTRIES, INC.

## (undated) DEVICE — CULT AER/ANAEROB FASTIDIOUS BACT